# Patient Record
Sex: FEMALE | Race: WHITE | NOT HISPANIC OR LATINO | Employment: OTHER | URBAN - METROPOLITAN AREA
[De-identification: names, ages, dates, MRNs, and addresses within clinical notes are randomized per-mention and may not be internally consistent; named-entity substitution may affect disease eponyms.]

---

## 2017-01-13 ENCOUNTER — TELEPHONE (OUTPATIENT)
Dept: FAMILY MEDICINE | Facility: CLINIC | Age: 72
End: 2017-01-13

## 2017-01-13 NOTE — TELEPHONE ENCOUNTER
SPoke to patient. Patient is inquiring about labs done in November. There is no note on the lab result. Call back number [phone].

## 2017-05-16 ENCOUNTER — OFFICE VISIT (OUTPATIENT)
Dept: FAMILY MEDICINE | Facility: CLINIC | Age: 72
End: 2017-05-16
Payer: MEDICARE

## 2017-05-16 VITALS
BODY MASS INDEX: 26.33 KG/M2 | OXYGEN SATURATION: 97 % | HEIGHT: 66 IN | WEIGHT: 163.81 LBS | DIASTOLIC BLOOD PRESSURE: 82 MMHG | HEART RATE: 54 BPM | SYSTOLIC BLOOD PRESSURE: 138 MMHG

## 2017-05-16 DIAGNOSIS — Z23 NEED FOR TETANUS BOOSTER: ICD-10-CM

## 2017-05-16 DIAGNOSIS — K21.9 GASTROESOPHAGEAL REFLUX DISEASE WITHOUT ESOPHAGITIS: ICD-10-CM

## 2017-05-16 DIAGNOSIS — J30.9 ALLERGIC RHINITIS, UNSPECIFIED ALLERGIC RHINITIS TRIGGER, UNSPECIFIED RHINITIS SEASONALITY: ICD-10-CM

## 2017-05-16 DIAGNOSIS — M81.0 OSTEOPOROSIS, UNSPECIFIED OSTEOPOROSIS TYPE, UNSPECIFIED PATHOLOGICAL FRACTURE PRESENCE: ICD-10-CM

## 2017-05-16 DIAGNOSIS — E03.9 ACQUIRED HYPOTHYROIDISM: Primary | ICD-10-CM

## 2017-05-16 DIAGNOSIS — G47.00 INSOMNIA, UNSPECIFIED TYPE: ICD-10-CM

## 2017-05-16 DIAGNOSIS — E78.5 HYPERLIPIDEMIA, UNSPECIFIED HYPERLIPIDEMIA TYPE: ICD-10-CM

## 2017-05-16 PROCEDURE — 99214 OFFICE O/P EST MOD 30 MIN: CPT | Mod: S$PBB,,, | Performed by: FAMILY MEDICINE

## 2017-05-16 PROCEDURE — 90714 TD VACC NO PRESV 7 YRS+ IM: CPT | Mod: PBBFAC,PO | Performed by: FAMILY MEDICINE

## 2017-05-16 PROCEDURE — 99999 PR PBB SHADOW E&M-EST. PATIENT-LVL III: CPT | Mod: PBBFAC,,, | Performed by: FAMILY MEDICINE

## 2017-05-16 PROCEDURE — 99213 OFFICE O/P EST LOW 20 MIN: CPT | Mod: PBBFAC,PO | Performed by: FAMILY MEDICINE

## 2017-05-16 RX ORDER — LEVOTHYROXINE SODIUM 88 UG/1
88 TABLET ORAL DAILY
Qty: 90 TABLET | Refills: 3 | Status: SHIPPED | OUTPATIENT
Start: 2017-05-16 | End: 2017-11-13 | Stop reason: SDUPTHER

## 2017-05-16 NOTE — PROGRESS NOTES
Subjective:       Patient ID: Sarahy Coronel is a 71 y.o. female.    Chief Complaint: 6 Month Follow-up, Thyroid    HPI Comments: Here for f/u chronic medical issues.  States doing well overall.     Hyperlipidemia   This is a chronic problem. The current episode started more than 1 year ago. The problem is controlled. Recent lipid tests were reviewed and are normal. Pertinent negatives include no chest pain or shortness of breath.     Review of Systems   Constitutional: Negative for chills, fatigue and fever.   Respiratory: Negative for cough, chest tightness and shortness of breath.    Cardiovascular: Negative for chest pain, palpitations and leg swelling.   Gastrointestinal: Negative for abdominal distention and abdominal pain.   Endocrine: Negative for cold intolerance and heat intolerance.   Skin: Negative for rash and wound.   Psychiatric/Behavioral: Negative for dysphoric mood. The patient is not nervous/anxious.        Objective:      Physical Exam   Constitutional: She appears well-developed and well-nourished.   HENT:   Head: Normocephalic and atraumatic.   Cardiovascular: Normal rate, regular rhythm and normal heart sounds.    Pulmonary/Chest: Effort normal and breath sounds normal.   Psychiatric: She has a normal mood and affect.   Nursing note and vitals reviewed.      Assessment:       1. Acquired hypothyroidism    2. Insomnia, unspecified type    3. Allergic rhinitis, unspecified allergic rhinitis trigger, unspecified rhinitis seasonality    4. Gastroesophageal reflux disease without esophagitis    5. Hyperlipidemia, unspecified hyperlipidemia type    6. Osteoporosis, unspecified osteoporosis type, unspecified pathological fracture presence    7. Need for tetanus booster        Plan:       Acquired hypothyroidism  -     CBC auto differential; Future; Expected date: 5/16/17  -     Comprehensive metabolic panel; Future; Expected date: 5/16/17  -     Lipid panel; Future; Expected date: 5/16/17  -     TSH;  Future; Expected date: 5/16/17    Insomnia, unspecified type  -     CBC auto differential; Future; Expected date: 5/16/17  -     Comprehensive metabolic panel; Future; Expected date: 5/16/17  -     Lipid panel; Future; Expected date: 5/16/17  -     TSH; Future; Expected date: 5/16/17    Allergic rhinitis, unspecified allergic rhinitis trigger, unspecified rhinitis seasonality  -     CBC auto differential; Future; Expected date: 5/16/17  -     Comprehensive metabolic panel; Future; Expected date: 5/16/17  -     Lipid panel; Future; Expected date: 5/16/17  -     TSH; Future; Expected date: 5/16/17    Gastroesophageal reflux disease without esophagitis  -     CBC auto differential; Future; Expected date: 5/16/17  -     Comprehensive metabolic panel; Future; Expected date: 5/16/17  -     Lipid panel; Future; Expected date: 5/16/17  -     TSH; Future; Expected date: 5/16/17    Hyperlipidemia, unspecified hyperlipidemia type  -     CBC auto differential; Future; Expected date: 5/16/17  -     Comprehensive metabolic panel; Future; Expected date: 5/16/17  -     Lipid panel; Future; Expected date: 5/16/17  -     TSH; Future; Expected date: 5/16/17    Osteoporosis, unspecified osteoporosis type, unspecified pathological fracture presence  -     CBC auto differential; Future; Expected date: 5/16/17  -     Comprehensive metabolic panel; Future; Expected date: 5/16/17  -     Lipid panel; Future; Expected date: 5/16/17  -     TSH; Future; Expected date: 5/16/17    Need for tetanus booster  -     Td Vaccine- Preservative Free    Other orders  -     levothyroxine (SYNTHROID) 88 MCG tablet; Take 1 tablet (88 mcg total) by mouth once daily.  Dispense: 90 tablet; Refill: 3      Will monitor chronic medical issues and continue current plan of care.  Update labs and health maintenance.  claritin prn.  Return in about 6 months (around 11/16/2017), or if symptoms worsen or fail to improve.

## 2017-05-16 NOTE — MR AVS SNAPSHOT
[facility]  [address]  [place] [address]  Phone: [phone]  Fax: [phone]                  Sarahy Coronel   2017 10:20 AM   Office Visit    Description:  Female : 1945   Provider:  SANTI Nogueira MD   Department:  [facility]           Reason for Visit     6 Month Follow-up, Thyroid           Diagnoses this Visit        Comments    Acquired hypothyroidism    -  Primary     Insomnia, unspecified type         Allergic rhinitis, unspecified allergic rhinitis trigger, unspecified rhinitis seasonality         Gastroesophageal reflux disease without esophagitis         Hyperlipidemia, unspecified hyperlipidemia type         Osteoporosis, unspecified osteoporosis type, unspecified pathological fracture presence         Need for tetanus booster                To Do List           Future Appointments        Provider Department Dept Phone    2017 10:20 AM SANTI Nogueira MD [facility] [phone]      Goals (5 Years of Data)     None      Follow-Up and Disposition     Return in about 6 months (around 2017), or if symptoms worsen or fail to improve.       These Medications        Disp Refills Start End    levothyroxine (SYNTHROID) 88 MCG tablet 90 tablet 3 2017    Take 1 tablet (88 mcg total) by mouth once daily. - Oral    Pharmacy: [facility]/pharmacy #[id number] - [place] - [address] [place] [address].  #: [phone]         [facility]     [facility] Care [facility] -  Assistance  Unless otherwise directed by your provider, please contact [facility], our nurse care line that is available for  assistance.     Registered nurses in the [facility] provide: appointment scheduling, clinical advisement, health education, and other advisory services.  Call: [phone] (toll free)               Medications           Message regarding Medications     Verify the changes and/or  "additions to your medication regime listed below are the same as discussed with your clinician today.  If any of these changes or additions are incorrect, please notify your healthcare provider.        STOP taking these medications     albuterol 90 mcg/actuation inhaler Inhale 2 puffs into the lungs every 6 (six) hours as needed for Wheezing.    hydrOXYzine HCl (ATARAX) 25 MG tablet Take 1 tablet (25 mg total) by mouth nightly as needed (insomnia).           Verify that the below list of medications is an accurate representation of the medications you are currently taking.  If none reported, the list may be blank. If incorrect, please contact your healthcare provider. Carry this list with you in case of emergency.           Current Medications     calcium-vitamin D (CALCIUM-VITAMIN D) 500 mg(1,250mg) -200 unit per tablet Take 1 tablet by mouth once daily.      cholecalciferol, vitamin D3, (VITAMIN D3) 2,000 unit Cap Take 1 capsule by mouth once daily.    fish oil-omega-3 fatty acids 300-1,000 mg capsule Take 2 g by mouth once daily.    GLUCOSAMINE/MSM/CHONDROIT SULF (GLUCOSAMINE 2KCL-MSM-CHONDROIT ORAL) Take by mouth.    levothyroxine (SYNTHROID) 88 MCG tablet Take 1 tablet (88 mcg total) by mouth once daily.    loratadine (CLARITIN) 10 mg tablet Take 10 mg by mouth once daily.    multivitamin (MULTIVITAMIN) per tablet Take 1 tablet by mouth once daily.      aspirin (ECOTRIN) 81 MG EC tablet Take 81 mg by mouth once daily.           Clinical Reference Information           Your Vitals Were     BP Pulse Height Weight SpO2 BMI    138/82 54 5' 6" (1.676 m) 74.3 kg (163 lb 12.8 oz) 97% 26.44 kg/m2      Blood Pressure          Most Recent Value    BP  138/82      Allergies as of 5/16/2017     Demerol (Pf) [Meperidine (Pf)]    Erythromycin    Lorabid [Loracarbef]    Penicillin G    Fosamax [Alendronate]      Immunizations Administered on Date of Encounter - 5/16/2017     Name Date Dose VIS Date Route    TD  Incomplete 0.5 " mL 2/24/2015 Intramuscular      Orders Placed During Today's Visit      Normal Orders This Visit    Td Vaccine- Preservative Free     Future Labs/Procedures Expected by Expires    CBC auto differential  5/16/2017 11/12/2017    Comprehensive metabolic panel  5/16/2017 11/12/2017    Lipid panel  5/16/2017 11/12/2017    TSH  5/16/2017 11/12/2017      [facility] Sign-Up     Activating your [facility] account is as easy as 1-2-3!     1) Visit [email or web address], select Sign Up Now, enter this activation code and your date of birth, then select Next.  Activation code not generated  Current Patient Portal Status: Account disabled      2) Create a username and password to use when you visit [facility] in the future and select a security question in case you lose your password and select Next.    3) Enter your e-mail address and click Sign Up!    Additional Information  If you have questions, please e-mail [email or web address] or call [phone] to talk to our [facility] staff. Remember, [facility] is NOT to be used for urgent needs. For medical emergencies, dial 911.         Language Assistance Services     ATTENTION: Language assistance services are available, free of charge. Please call [phone].      ATENCIÓN: Si marisla gerard, tiene a kumar disposición servicios gratuitos de asistencia lingüística. Llame al [phone].     CHÚ Ý: N?u b?n nói Ti?ng Vi?t, có các d?ch v? h? tr? ngôn ng? mi?n phí dành cho b?n. G?i s? [phone].         [facility] complies with applicable Federal civil rights laws and does not discriminate on the basis of race, color, national origin, age, disability, or sex.

## 2017-06-24 ENCOUNTER — LAB VISIT (OUTPATIENT)
Dept: LAB | Facility: HOSPITAL | Age: 72
End: 2017-06-24
Attending: FAMILY MEDICINE
Payer: MEDICARE

## 2017-06-24 DIAGNOSIS — E78.5 HYPERLIPIDEMIA, UNSPECIFIED HYPERLIPIDEMIA TYPE: ICD-10-CM

## 2017-06-24 DIAGNOSIS — M81.0 OSTEOPOROSIS, UNSPECIFIED OSTEOPOROSIS TYPE, UNSPECIFIED PATHOLOGICAL FRACTURE PRESENCE: ICD-10-CM

## 2017-06-24 DIAGNOSIS — J30.9 ALLERGIC RHINITIS, UNSPECIFIED ALLERGIC RHINITIS TRIGGER, UNSPECIFIED RHINITIS SEASONALITY: ICD-10-CM

## 2017-06-24 DIAGNOSIS — G47.00 INSOMNIA, UNSPECIFIED TYPE: ICD-10-CM

## 2017-06-24 DIAGNOSIS — K21.9 GASTROESOPHAGEAL REFLUX DISEASE WITHOUT ESOPHAGITIS: ICD-10-CM

## 2017-06-24 DIAGNOSIS — E03.9 ACQUIRED HYPOTHYROIDISM: ICD-10-CM

## 2017-06-24 LAB
ALBUMIN SERPL BCP-MCNC: 3.8 G/DL
ALP SERPL-CCNC: 57 U/L
ALT SERPL W/O P-5'-P-CCNC: 20 U/L
ANION GAP SERPL CALC-SCNC: 8 MMOL/L
AST SERPL-CCNC: 21 U/L
BASOPHILS # BLD AUTO: 0.02 K/UL
BASOPHILS NFR BLD: 0.4 %
BILIRUB SERPL-MCNC: 0.4 MG/DL
BUN SERPL-MCNC: 22 MG/DL
CALCIUM SERPL-MCNC: 9.9 MG/DL
CHLORIDE SERPL-SCNC: 108 MMOL/L
CHOLEST/HDLC SERPL: 3.8 {RATIO}
CO2 SERPL-SCNC: 26 MMOL/L
CREAT SERPL-MCNC: 0.9 MG/DL
DIFFERENTIAL METHOD: NORMAL
EOSINOPHIL # BLD AUTO: 0.1 K/UL
EOSINOPHIL NFR BLD: 1.7 %
ERYTHROCYTE [DISTWIDTH] IN BLOOD BY AUTOMATED COUNT: 12.8 %
EST. GFR  (AFRICAN AMERICAN): >60 ML/MIN/1.73 M^2
EST. GFR  (NON AFRICAN AMERICAN): >60 ML/MIN/1.73 M^2
GLUCOSE SERPL-MCNC: 98 MG/DL
HCT VFR BLD AUTO: 40.1 %
HDL/CHOLESTEROL RATIO: 26.2 %
HDLC SERPL-MCNC: 214 MG/DL
HDLC SERPL-MCNC: 56 MG/DL
HGB BLD-MCNC: 13.2 G/DL
LDLC SERPL CALC-MCNC: 136.2 MG/DL
LYMPHOCYTES # BLD AUTO: 1.4 K/UL
LYMPHOCYTES NFR BLD: 29.1 %
MCH RBC QN AUTO: 30.1 PG
MCHC RBC AUTO-ENTMCNC: 32.9 %
MCV RBC AUTO: 92 FL
MONOCYTES # BLD AUTO: 0.4 K/UL
MONOCYTES NFR BLD: 8.9 %
NEUTROPHILS # BLD AUTO: 2.9 K/UL
NEUTROPHILS NFR BLD: 59.7 %
NONHDLC SERPL-MCNC: 158 MG/DL
PLATELET # BLD AUTO: 195 K/UL
PMV BLD AUTO: 11.6 FL
POTASSIUM SERPL-SCNC: 5.3 MMOL/L
PROT SERPL-MCNC: 7.4 G/DL
RBC # BLD AUTO: 4.38 M/UL
SODIUM SERPL-SCNC: 142 MMOL/L
TRIGL SERPL-MCNC: 109 MG/DL
TSH SERPL DL<=0.005 MIU/L-ACNC: 1.06 UIU/ML
WBC # BLD AUTO: 4.84 K/UL

## 2017-06-24 PROCEDURE — 84443 ASSAY THYROID STIM HORMONE: CPT

## 2017-06-24 PROCEDURE — 85025 COMPLETE CBC W/AUTO DIFF WBC: CPT

## 2017-06-24 PROCEDURE — 80061 LIPID PANEL: CPT

## 2017-06-24 PROCEDURE — 36415 COLL VENOUS BLD VENIPUNCTURE: CPT | Mod: PO

## 2017-06-24 PROCEDURE — 80053 COMPREHEN METABOLIC PANEL: CPT

## 2017-06-28 DIAGNOSIS — E87.5 HYPERKALEMIA: Primary | ICD-10-CM

## 2017-06-30 ENCOUNTER — TELEPHONE (OUTPATIENT)
Dept: FAMILY MEDICINE | Facility: CLINIC | Age: 72
End: 2017-06-30

## 2017-06-30 NOTE — TELEPHONE ENCOUNTER
----- Message from Keya Alex sent at 6/30/2017  2:52 PM CDT -----  Contact: pt  Returning call, results  Call back on # [phone]  thanks

## 2017-07-10 ENCOUNTER — OFFICE VISIT (OUTPATIENT)
Dept: PRIMARY CARE CLINIC | Facility: CLINIC | Age: 72
End: 2017-07-10
Payer: MEDICARE

## 2017-07-10 VITALS
OXYGEN SATURATION: 96 % | WEIGHT: 163.13 LBS | HEART RATE: 65 BPM | SYSTOLIC BLOOD PRESSURE: 116 MMHG | DIASTOLIC BLOOD PRESSURE: 80 MMHG | TEMPERATURE: 98 F | BODY MASS INDEX: 26.22 KG/M2 | HEIGHT: 66 IN

## 2017-07-10 DIAGNOSIS — L23.7 POISON IVY DERMATITIS: Primary | ICD-10-CM

## 2017-07-10 DIAGNOSIS — L98.9 SKIN LESION OF RIGHT ARM: ICD-10-CM

## 2017-07-10 DIAGNOSIS — M26.621 ARTHRALGIA OF RIGHT TEMPOROMANDIBULAR JOINT: ICD-10-CM

## 2017-07-10 PROCEDURE — 1126F AMNT PAIN NOTED NONE PRSNT: CPT | Mod: ,,, | Performed by: NURSE PRACTITIONER

## 2017-07-10 PROCEDURE — 99999 PR PBB SHADOW E&M-EST. PATIENT-LVL IV: CPT | Mod: PBBFAC,,, | Performed by: NURSE PRACTITIONER

## 2017-07-10 PROCEDURE — 99214 OFFICE O/P EST MOD 30 MIN: CPT | Mod: PBBFAC,PO | Performed by: NURSE PRACTITIONER

## 2017-07-10 PROCEDURE — 99213 OFFICE O/P EST LOW 20 MIN: CPT | Mod: S$PBB,25,, | Performed by: NURSE PRACTITIONER

## 2017-07-10 PROCEDURE — 1159F MED LIST DOCD IN RCRD: CPT | Mod: ,,, | Performed by: NURSE PRACTITIONER

## 2017-07-10 RX ORDER — BETAMETHASONE SODIUM PHOSPHATE AND BETAMETHASONE ACETATE 3; 3 MG/ML; MG/ML
6 INJECTION, SUSPENSION INTRA-ARTICULAR; INTRALESIONAL; INTRAMUSCULAR; SOFT TISSUE
Status: COMPLETED | OUTPATIENT
Start: 2017-07-10 | End: 2017-07-10

## 2017-07-10 RX ADMIN — BETAMETHASONE SODIUM PHOSPHATE AND BETAMETHASONE ACETATE 6 MG: 3; 3 INJECTION, SUSPENSION INTRA-ARTICULAR; INTRALESIONAL; INTRAMUSCULAR at 01:07

## 2017-07-10 NOTE — PATIENT INSTRUCTIONS
Your symptoms and exam today are consistent with poison ivy.    Topical Ivarest as needed    Benadryl at bedtime and Osfoiqot62 mg tab in the morning for itching per instructions.    Cool compresses through out the day until improved.    If you are not better in 2-3 days, if worse or you have concerns or questions, please do not hesitate to call.  You can reach us at [phone] Monday through Friday (except holidays) 12 nooon to 6 p.m.    Jaw: pain seems to me be improving.  Ibuprofen.    Avoid chewy foods, gums, candies.    For the right arm lesion:  See Dr. Lopez.    Thank you for using the [facility]!    YOSI Gonzalez, CNP, FNP-BC  [facility]  [facility]

## 2017-07-10 NOTE — PROGRESS NOTES
Sarahy Coronel is a 71 y.o. female patient of FREDRICK Nogueira MD who presents to the clinic today for   Chief Complaint   Patient presents with    Poison Ivy   .    HPI    Pt, who is not known to me, reports a new problem to me:  Rash on her arms after weed eating  or 17.    These symptoms began 5 days ago and status is continuing.     Pt denies the following symptoms:  Fever, illness.    Aggravating factors include poison ivy exposure .    Relieving factors include none .    OTC Medications tried are IvaRest.    Prescription medications taken for symptoms are none.    Pertinent medical history:  H/o extreme allergy to poison ivy.    ROS    Constitutional:  No fever, + fatigue-- never sleeps well.    Skin:  See chief complaint/HPI..  Also started a few weeks ago with a spot on the right post arm an itchy bump, slightly bigger.    HEENT:  No complaints.    Heart/Lungs:  No complaints    GI/:  No sxs.    MS:  No new problems in bones, joints or muscles.      PAST MEDICAL HISTORY:  Past Medical History:   Diagnosis Date    Actinic keratoses     Allergy     Chronic bronchitis     DVT (deep venous thrombosis)     leg, no precipitating factors, no longer on anticoagulants    Encounter for blood transfusion     ectopic pregnancy    Family history of complications due to general anesthesia     sister's daughter almost  from MALIGNANT HYPERTHERMIA ( pt denies adverse reaction)    GERD (gastroesophageal reflux disease)     no medication    Hypothyroidism     Leukopenia past Hx    Mitral regurgitation     mild,asymptomatic    Phlebitis     past Hx, legs    PONV (postoperative nausea and vomiting)     once only    TMJ (dislocation of temporomandibular joint)        PAST SURGICAL HISTORY:  Past Surgical History:   Procedure Laterality Date    APPENDECTOMY      CHOLECYSTECTOMY      ECTOPIC PREGNANCY SURGERY      removal of ovary and tube prior to hysterectomy    ESOPHAGOGASTRODUODENOSCOPY       KNEE SURGERY  8/14    meniscal repair    LIPOMA RESECTION      left shoulder    TOTAL ABDOMINAL HYSTERECTOMY W/ BILATERAL SALPINGOOPHORECTOMY  1970's    after ectopic pregnancy       SOCIAL HISTORY:  Social History     Social History    Marital status:      Spouse name: N/A    Number of children: N/A    Years of education: N/A     Occupational History    Not on file.     Social History Main Topics    Smoking status: Never Smoker    Smokeless tobacco: Never Used    Alcohol use No    Drug use: No    Sexual activity: Not on file     Other Topics Concern    Not on file     Social History Narrative    No narrative on file       FAMILY HISTORY:  Family History   Problem Relation Age of Onset    Heart disease Mother     Cancer Father     Malignant hyperthermia Other      niece       ALLERGIES AND MEDICATIONS: updated and reviewed.  Review of patient's allergies indicates:   Allergen Reactions    Demerol (pf) [meperidine (pf)] Nausea And Vomiting    Erythromycin Other (See Comments)     Stomach cramps    Lorabid [loracarbef] Nausea And Vomiting    Penicillin g Hives    Fosamax [alendronate] Other (See Comments)     Bone pain     Current Outpatient Prescriptions   Medication Sig Dispense Refill    aspirin (ECOTRIN) 81 MG EC tablet Take 81 mg by mouth once daily.      calcium-vitamin D (CALCIUM-VITAMIN D) 500 mg(1,250mg) -200 unit per tablet Take 1 tablet by mouth once daily.        cholecalciferol, vitamin D3, (VITAMIN D3) 2,000 unit Cap Take 1 capsule by mouth once daily.      fish oil-omega-3 fatty acids 300-1,000 mg capsule Take 2 g by mouth once daily.      GLUCOSAMINE/MSM/CHONDROIT SULF (GLUCOSAMINE 2KCL-MSM-CHONDROIT ORAL) Take by mouth.      levothyroxine (SYNTHROID) 88 MCG tablet Take 1 tablet (88 mcg total) by mouth once daily. 90 tablet 3    loratadine (CLARITIN) 10 mg tablet Take 10 mg by mouth daily as needed.        No current facility-administered medications for this  visit.          PHYSICAL EXAM    Alert, coop 71 y.o. female patient in no acute distress, is not ill-appearing.    Vitals:    07/10/17 1235   BP: 116/80   Pulse: 65   Temp: 98.3 °F (36.8 °C)     VS reviewed.  VS stable.  CC, nursing note, medications & PMH all reviewed today.    Head:  Normocephalic, atraumatic.    EENT:  Ext nose/ears normal.               Eyes with normal lids, not injected.               Tongue w/o lesions.      Resp:  Respirations even, unlabored.              Lungs CTA bilat.    Heart:  RRR, no MRG.    MS:  Ambulates normally.          No TMJ edema or redness.  Left with clicking, right with mild tenderness with opening mouth wide..     NEURO:  Alert and oriented x 4.  Responds appropriately during interaction.    Skin:  Warm, dry, color good.            A/an erythematous, pruritic rash of small, densely distributed papules is noted on forearms and scattered on distal upper arms.            No pustules or vesicles noted.           Back of the right upper arm is noted an irregularly shaped, evenly pigmented, somewhat shiny lesion, approx 0.75 cm diam, NT, nonerythematous.    Psych:  Responds appropriately throughout the visit.               Relaxed.  Well-groomed    Poison ivy dermatitis  -     betamethasone acetate-betamethasone sodium phosphate injection 6 mg; Inject 1 mL (6 mg total) into the muscle one time.    Arthralgia of right temporomandibular joint    Skin lesion of right arm  Comments:  has appt with [facility], Dr. Lopez      Pt today presents with 5 days of rash on her arms after weed eating.  Very allergic to poison ivy, she states.  No breathing problems.  OTCs helping some but not resolving.  Has had steroids for this in the past w/o problems.  Right TMJ pain but no redness, crepitus or swelling.  This seems improved.  Right post arm lesion--agree with evaluation by derm.    This is a new problem to me.  No work up is planned.        Pt advised to perform comfort measures  recommended on patient instruction sheet .    If rash not better in 2-3 days, the patient is advised to call us.  If worse or concerns, the patient is advised to call us.  Explained exam findings, diagnosis and treatment plan to patient.  Questions answered and patient states understanding.

## 2017-07-18 ENCOUNTER — LAB VISIT (OUTPATIENT)
Dept: LAB | Facility: HOSPITAL | Age: 72
End: 2017-07-18
Attending: FAMILY MEDICINE
Payer: MEDICARE

## 2017-07-18 DIAGNOSIS — E87.5 HYPERKALEMIA: ICD-10-CM

## 2017-07-18 LAB
ANION GAP SERPL CALC-SCNC: 9 MMOL/L
BUN SERPL-MCNC: 17 MG/DL
CALCIUM SERPL-MCNC: 9.7 MG/DL
CHLORIDE SERPL-SCNC: 107 MMOL/L
CO2 SERPL-SCNC: 26 MMOL/L
CREAT SERPL-MCNC: 0.8 MG/DL
EST. GFR  (AFRICAN AMERICAN): >60 ML/MIN/1.73 M^2
EST. GFR  (NON AFRICAN AMERICAN): >60 ML/MIN/1.73 M^2
GLUCOSE SERPL-MCNC: 101 MG/DL
POTASSIUM SERPL-SCNC: 4.8 MMOL/L
SODIUM SERPL-SCNC: 142 MMOL/L

## 2017-07-18 PROCEDURE — 36415 COLL VENOUS BLD VENIPUNCTURE: CPT | Mod: PO

## 2017-07-18 PROCEDURE — 80048 BASIC METABOLIC PNL TOTAL CA: CPT | Mod: PO

## 2017-07-19 ENCOUNTER — TELEPHONE (OUTPATIENT)
Dept: FAMILY MEDICINE | Facility: CLINIC | Age: 72
End: 2017-07-19

## 2017-07-19 NOTE — TELEPHONE ENCOUNTER
Spoke to pt and she would like results of recent lab. States if it is still high does she need a kidney test.  Pt notified that dr alvarez is out of the office and I will forward the message when he returns. Pt verbalized understanding.

## 2017-07-19 NOTE — TELEPHONE ENCOUNTER
----- Message from Joelle Menendez sent at 7/19/2017  2:02 PM CDT -----  Contact: pt  Lab result  Call back

## 2017-07-21 NOTE — TELEPHONE ENCOUNTER
Dr Moon this patient is asking for results of BMP, kevin CMP noted elevated potassium. Patient is concerned and is asking for lab results. Can you please advise of results. Dr Nogueira is out of the office, Thank you .

## 2017-09-20 ENCOUNTER — HOSPITAL ENCOUNTER (OUTPATIENT)
Dept: RADIOLOGY | Facility: HOSPITAL | Age: 72
Discharge: HOME | End: 2017-09-20
Attending: ORTHOPAEDIC SURGERY
Payer: MEDICARE

## 2017-09-20 ENCOUNTER — OFFICE VISIT (OUTPATIENT)
Dept: ORTHOPEDICS | Facility: CLINIC | Age: 72
End: 2017-09-20
Payer: MEDICARE

## 2017-09-20 VITALS — HEIGHT: 66 IN | BODY MASS INDEX: 26.2 KG/M2 | WEIGHT: 163 LBS

## 2017-09-20 DIAGNOSIS — M17.11 PRIMARY OSTEOARTHRITIS OF RIGHT KNEE: Primary | ICD-10-CM

## 2017-09-20 DIAGNOSIS — M25.561 CHRONIC PAIN OF RIGHT KNEE: ICD-10-CM

## 2017-09-20 DIAGNOSIS — G89.29 CHRONIC PAIN OF RIGHT KNEE: ICD-10-CM

## 2017-09-20 DIAGNOSIS — M25.561 RIGHT KNEE PAIN, UNSPECIFIED CHRONICITY: Primary | ICD-10-CM

## 2017-09-20 DIAGNOSIS — M25.561 RIGHT KNEE PAIN, UNSPECIFIED CHRONICITY: ICD-10-CM

## 2017-09-20 PROCEDURE — 1159F MED LIST DOCD IN RCRD: CPT | Mod: ,,, | Performed by: ORTHOPAEDIC SURGERY

## 2017-09-20 PROCEDURE — 99999 PR PBB SHADOW E&M-EST. PATIENT-LVL II: CPT | Mod: PBBFAC,,, | Performed by: ORTHOPAEDIC SURGERY

## 2017-09-20 PROCEDURE — 73562 X-RAY EXAM OF KNEE 3: CPT | Mod: 26,RT,, | Performed by: RADIOLOGY

## 2017-09-20 PROCEDURE — 73560 X-RAY EXAM OF KNEE 1 OR 2: CPT | Mod: 26,59,LT, | Performed by: RADIOLOGY

## 2017-09-20 PROCEDURE — 73560 X-RAY EXAM OF KNEE 1 OR 2: CPT | Mod: TC,PO,LT

## 2017-09-20 PROCEDURE — 1125F AMNT PAIN NOTED PAIN PRSNT: CPT | Mod: ,,, | Performed by: ORTHOPAEDIC SURGERY

## 2017-09-20 PROCEDURE — 99214 OFFICE O/P EST MOD 30 MIN: CPT | Mod: 25,S$PBB,, | Performed by: ORTHOPAEDIC SURGERY

## 2017-09-20 PROCEDURE — 20610 DRAIN/INJ JOINT/BURSA W/O US: CPT | Mod: PBBFAC,PO | Performed by: ORTHOPAEDIC SURGERY

## 2017-09-20 PROCEDURE — 99212 OFFICE O/P EST SF 10 MIN: CPT | Mod: PBBFAC,25,PO | Performed by: ORTHOPAEDIC SURGERY

## 2017-09-20 RX ORDER — TRIAMCINOLONE ACETONIDE 40 MG/ML
40 INJECTION, SUSPENSION INTRA-ARTICULAR; INTRAMUSCULAR
Status: DISCONTINUED | OUTPATIENT
Start: 2017-09-20 | End: 2017-09-20 | Stop reason: HOSPADM

## 2017-09-20 RX ADMIN — TRIAMCINOLONE ACETONIDE 40 MG: 40 INJECTION, SUSPENSION INTRA-ARTICULAR; INTRAMUSCULAR at 12:09

## 2017-09-20 NOTE — PROGRESS NOTES
HISTORY OF PRESENT ILLNESS:  71 years old, complaining of pain in her right knee   for a couple of days' time, had injection over a year ago, responded well,   requesting an injection today, aching type pain, 4/10 on the pain scale.    PHYSICAL EXAMINATION:  Exam shows tenderness at the joint line.  No signs of   infection.  No instability.  Skin is intact.  Compartments are soft.    X-rays show arthritic changes.    ASSESSMENT:  Right knee arthrosis.    PLAN:  Kenalog injection, strengthening over time.  Follow up as needed.      PBB/HN  dd: 2017 12:43:06 (CDT)  td: 2017 03:31:28 (CDT)  Doc ID   #[id number]  Job ID #[id number]    CC:     Further History  Aching pain  Worse with activity  Relieved with rest  No other associated symptoms  No other radiation    Further Exam  Alert and oriented  Pleasant  Contralateral limb has appropriate range of motion for age and condition  Contralateral limb has appropriate strength for age and condition  Contralateral limb has appropriate stability  for age and condition  No adenopathy  Pulses are appropriate for current condition  Skin is intact        Chief Complaint    Chief Complaint   Patient presents with    Knee Pain     right knee pain       HPI  Sarahy Coronel is a 71 y.o.  female who presents with       Past Medical History  Past Medical History:   Diagnosis Date    Actinic keratoses     Allergy     Chronic bronchitis     DVT (deep venous thrombosis)     leg, no precipitating factors, no longer on anticoagulants    Encounter for blood transfusion     ectopic pregnancy    Family history of complications due to general anesthesia     sister's daughter almost  from MALIGNANT HYPERTHERMIA ( pt denies adverse reaction)    GERD (gastroesophageal reflux disease)     no medication    Hypothyroidism     Leukopenia past Hx    Mitral regurgitation     mild,asymptomatic    Phlebitis     past Hx, legs    PONV (postoperative nausea and vomiting)     once only     TMJ (dislocation of temporomandibular joint)        Past Surgical History  Past Surgical History:   Procedure Laterality Date    APPENDECTOMY      CHOLECYSTECTOMY      ECTOPIC PREGNANCY SURGERY      removal of ovary and tube prior to hysterectomy    ESOPHAGOGASTRODUODENOSCOPY      KNEE SURGERY  8/14    meniscal repair    LIPOMA RESECTION      left shoulder    TOTAL ABDOMINAL HYSTERECTOMY W/ BILATERAL SALPINGOOPHORECTOMY  1970's    after ectopic pregnancy       Medications  Current Outpatient Prescriptions   Medication Sig    aspirin (ECOTRIN) 81 MG EC tablet Take 81 mg by mouth once daily.    calcium-vitamin D (CALCIUM-VITAMIN D) 500 mg(1,250mg) -200 unit per tablet Take 1 tablet by mouth once daily.      cholecalciferol, vitamin D3, (VITAMIN D3) 2,000 unit Cap Take 1 capsule by mouth once daily.    fish oil-omega-3 fatty acids 300-1,000 mg capsule Take 2 g by mouth once daily.    GLUCOSAMINE/MSM/CHONDROIT SULF (GLUCOSAMINE 2KCL-MSM-CHONDROIT ORAL) Take by mouth.    levothyroxine (SYNTHROID) 88 MCG tablet Take 1 tablet (88 mcg total) by mouth once daily.    loratadine (CLARITIN) 10 mg tablet Take 10 mg by mouth daily as needed.      No current facility-administered medications for this visit.        Allergies  Review of patient's allergies indicates:   Allergen Reactions    Demerol (pf) [meperidine (pf)] Nausea And Vomiting    Erythromycin Other (See Comments)     Stomach cramps    Lorabid [loracarbef] Nausea And Vomiting    Penicillin g Hives    Fosamax [alendronate] Other (See Comments)     Bone pain       Family History  Family History   Problem Relation Age of Onset    Heart disease Mother     Cancer Father     Malignant hyperthermia Other      niece       Social History  Social History     Social History    Marital status:      Spouse name: N/A    Number of children: N/A    Years of education: N/A     Occupational History    Not on file.     Social History Main Topics     Smoking status: Never Smoker    Smokeless tobacco: Never Used    Alcohol use No    Drug use: No    Sexual activity: Not on file     Other Topics Concern    Not on file     Social History Narrative    No narrative on file               Review of Systems     Constitutional: Negative    HENT: Negative  Eyes: Negative  Respiratory: Negative  Cardiovascular: Negative  Musculoskeletal: HPI  Skin: Negative  Neurological: Negative  Hematological: Negative  Endocrine: Negative                 Physical Exam    There were no vitals filed for this visit.  Body mass index is 26.31 kg/m².  Physical Examination:     General appearance -  well appearing, and in no distress  Mental status - awake  Neck - supple  Chest -  symmetric air entry  Heart - normal rate   Abdomen - soft      Assessment     1. Primary osteoarthritis of right knee    2. Chronic pain of right knee          Plan

## 2017-09-20 NOTE — PROCEDURES
Large Joint Aspiration/Injection  Date/Time: 9/20/2017 12:43 PM  Performed by: CHAYA CURTIS  Authorized by: CHAYA CURTIS.     Consent Done?:  Yes (Verbal)  Indications:  Pain  Timeout: Prior to procedure the correct patient, procedure, and site was verified      Location:  Knee  Site:  R knee  Prep: Patient was prepped and draped in usual sterile fashion    Ultrasonic Guidance for needle placement: No  Needle size:  21 G  Approach:  Anterolateral  Medications:  40 mg triamcinolone acetonide 40 mg/mL  Patient tolerance:  Patient tolerated the procedure well with no immediate complications

## 2017-09-28 ENCOUNTER — HOSPITAL ENCOUNTER (OUTPATIENT)
Dept: RADIOLOGY | Facility: HOSPITAL | Age: 72
Discharge: HOME | End: 2017-09-28
Attending: FAMILY MEDICINE
Payer: MEDICARE

## 2017-09-28 DIAGNOSIS — Z12.31 ENCOUNTER FOR SCREENING MAMMOGRAM FOR MALIGNANT NEOPLASM OF BREAST: ICD-10-CM

## 2017-09-28 DIAGNOSIS — Z12.31 VISIT FOR SCREENING MAMMOGRAM: ICD-10-CM

## 2017-09-28 PROCEDURE — 77067 SCR MAMMO BI INCL CAD: CPT | Mod: 26,,, | Performed by: RADIOLOGY

## 2017-09-28 PROCEDURE — 77063 BREAST TOMOSYNTHESIS BI: CPT | Mod: 26,,, | Performed by: RADIOLOGY

## 2017-09-28 PROCEDURE — 77067 SCR MAMMO BI INCL CAD: CPT | Mod: TC

## 2017-09-29 ENCOUNTER — TELEPHONE (OUTPATIENT)
Dept: RADIOLOGY | Facility: HOSPITAL | Age: 72
End: 2017-09-29

## 2017-10-05 ENCOUNTER — HOSPITAL ENCOUNTER (OUTPATIENT)
Dept: RADIOLOGY | Facility: HOSPITAL | Age: 72
Discharge: HOME | End: 2017-10-05
Attending: FAMILY MEDICINE
Payer: MEDICARE

## 2017-10-05 DIAGNOSIS — R92.8 ABNORMAL MAMMOGRAM OF RIGHT BREAST: ICD-10-CM

## 2017-10-05 PROCEDURE — 77061 BREAST TOMOSYNTHESIS UNI: CPT | Mod: 26,,, | Performed by: RADIOLOGY

## 2017-10-05 PROCEDURE — 77061 BREAST TOMOSYNTHESIS UNI: CPT | Mod: TC

## 2017-10-05 PROCEDURE — 77065 DX MAMMO INCL CAD UNI: CPT | Mod: 26,,, | Performed by: RADIOLOGY

## 2017-10-05 PROCEDURE — 76642 ULTRASOUND BREAST LIMITED: CPT | Mod: 26,RT,, | Performed by: RADIOLOGY

## 2017-10-05 PROCEDURE — 76642 ULTRASOUND BREAST LIMITED: CPT | Mod: TC,PO,RT

## 2017-10-06 ENCOUNTER — TELEPHONE (OUTPATIENT)
Dept: RADIOLOGY | Facility: HOSPITAL | Age: 72
End: 2017-10-06

## 2017-10-06 NOTE — TELEPHONE ENCOUNTER
..Patient notified of diagnostic mammogram results. Right breast biopsy is scheduled on  10/12/0217

## 2017-10-12 ENCOUNTER — HOSPITAL ENCOUNTER (OUTPATIENT)
Dept: RADIOLOGY | Facility: HOSPITAL | Age: 72
Discharge: HOME | End: 2017-10-12
Attending: FAMILY MEDICINE
Payer: MEDICARE

## 2017-10-12 DIAGNOSIS — R92.8 ABNORMAL MAMMOGRAM OF RIGHT BREAST: ICD-10-CM

## 2017-10-12 PROCEDURE — 88360 TUMOR IMMUNOHISTOCHEM/MANUAL: CPT | Mod: 26,,, | Performed by: PATHOLOGY

## 2017-10-12 PROCEDURE — 88305 TISSUE EXAM BY PATHOLOGIST: CPT | Performed by: PATHOLOGY

## 2017-10-12 PROCEDURE — 88305 TISSUE EXAM BY PATHOLOGIST: CPT | Mod: 26,,, | Performed by: PATHOLOGY

## 2017-10-12 PROCEDURE — A4648 IMPLANTABLE TISSUE MARKER: HCPCS | Mod: PO

## 2017-10-12 PROCEDURE — 88360 TUMOR IMMUNOHISTOCHEM/MANUAL: CPT | Performed by: PATHOLOGY

## 2017-10-12 PROCEDURE — 19083 BX BREAST 1ST LESION US IMAG: CPT | Mod: ,,, | Performed by: RADIOLOGY

## 2017-10-17 ENCOUNTER — TELEPHONE (OUTPATIENT)
Dept: FAMILY MEDICINE | Facility: CLINIC | Age: 72
End: 2017-10-17

## 2017-10-17 NOTE — TELEPHONE ENCOUNTER
Patient is asking for an abx to be called to pharmacy. States she is coughing up thick green mucous and temp of 101.  Taking mucinex and Claritin. I advised patient she needs to come in for an appointment for evaluation. Patient states she it too fatigued to drive and doesn't have anyone to bring her. Please advise.

## 2017-10-17 NOTE — TELEPHONE ENCOUNTER
----- Message from Tai Jackson sent at 10/17/2017  3:38 PM CDT -----  Contact: Patient  Patient states that she had a biopsy done on the right breast and she would like to know if the results have come in.  She also has an upper respitory infection and she would like for some medications to be called in.  Can you please call the patient back at [phone].  Thank you      [facility] #[id number] - [place] - [address]  [address]  [place] [address]  Phone: [phone] Fax: [phone]

## 2017-10-23 ENCOUNTER — TELEPHONE (OUTPATIENT)
Dept: RADIOLOGY | Facility: HOSPITAL | Age: 72
End: 2017-10-23

## 2017-10-23 ENCOUNTER — OFFICE VISIT (OUTPATIENT)
Dept: SURGERY | Facility: CLINIC | Age: 72
End: 2017-10-23
Payer: MEDICARE

## 2017-10-23 ENCOUNTER — LAB VISIT (OUTPATIENT)
Dept: LAB | Facility: HOSPITAL | Age: 72
End: 2017-10-23
Attending: SURGERY
Payer: MEDICARE

## 2017-10-23 VITALS
HEART RATE: 71 BPM | SYSTOLIC BLOOD PRESSURE: 162 MMHG | WEIGHT: 156.06 LBS | TEMPERATURE: 98 F | BODY MASS INDEX: 25.19 KG/M2 | DIASTOLIC BLOOD PRESSURE: 73 MMHG

## 2017-10-23 DIAGNOSIS — N63.0 BREAST MASS IN FEMALE: Primary | ICD-10-CM

## 2017-10-23 DIAGNOSIS — Z01.818 PREOP TESTING: ICD-10-CM

## 2017-10-23 LAB
ALBUMIN SERPL BCP-MCNC: 3.6 G/DL
ALP SERPL-CCNC: 68 U/L
ALT SERPL W/O P-5'-P-CCNC: 17 U/L
ANION GAP SERPL CALC-SCNC: 7 MMOL/L
AST SERPL-CCNC: 19 U/L
BASOPHILS # BLD AUTO: 0.03 K/UL
BASOPHILS NFR BLD: 0.5 %
BILIRUB SERPL-MCNC: 0.2 MG/DL
BUN SERPL-MCNC: 16 MG/DL
CALCIUM SERPL-MCNC: 10.2 MG/DL
CHLORIDE SERPL-SCNC: 106 MMOL/L
CO2 SERPL-SCNC: 28 MMOL/L
CREAT SERPL-MCNC: 1 MG/DL
DIFFERENTIAL METHOD: NORMAL
EOSINOPHIL # BLD AUTO: 0.1 K/UL
EOSINOPHIL NFR BLD: 1.1 %
ERYTHROCYTE [DISTWIDTH] IN BLOOD BY AUTOMATED COUNT: 12.2 %
EST. GFR  (AFRICAN AMERICAN): >60 ML/MIN/1.73 M^2
EST. GFR  (NON AFRICAN AMERICAN): 57.2 ML/MIN/1.73 M^2
GLUCOSE SERPL-MCNC: 113 MG/DL
HCT VFR BLD AUTO: 37.4 %
HGB BLD-MCNC: 13 G/DL
IMM GRANULOCYTES # BLD AUTO: 0.03 K/UL
IMM GRANULOCYTES NFR BLD AUTO: 0.5 %
LYMPHOCYTES # BLD AUTO: 1.5 K/UL
LYMPHOCYTES NFR BLD: 23 %
MCH RBC QN AUTO: 30.6 PG
MCHC RBC AUTO-ENTMCNC: 34.8 G/DL
MCV RBC AUTO: 88 FL
MONOCYTES # BLD AUTO: 0.5 K/UL
MONOCYTES NFR BLD: 7.7 %
NEUTROPHILS # BLD AUTO: 4.5 K/UL
NEUTROPHILS NFR BLD: 67.2 %
NRBC BLD-RTO: 0 /100 WBC
PLATELET # BLD AUTO: 253 K/UL
PMV BLD AUTO: 11.4 FL
POTASSIUM SERPL-SCNC: 4.9 MMOL/L
PROT SERPL-MCNC: 7.6 G/DL
RBC # BLD AUTO: 4.25 M/UL
SODIUM SERPL-SCNC: 141 MMOL/L
WBC # BLD AUTO: 6.62 K/UL

## 2017-10-23 PROCEDURE — 99999 PR PBB SHADOW E&M-EST. PATIENT-LVL III: CPT | Mod: PBBFAC,,, | Performed by: SURGERY

## 2017-10-23 PROCEDURE — 80053 COMPREHEN METABOLIC PANEL: CPT

## 2017-10-23 PROCEDURE — 99213 OFFICE O/P EST LOW 20 MIN: CPT | Mod: PBBFAC,PO | Performed by: SURGERY

## 2017-10-23 PROCEDURE — 36415 COLL VENOUS BLD VENIPUNCTURE: CPT | Mod: PO

## 2017-10-23 PROCEDURE — 93005 ELECTROCARDIOGRAM TRACING: CPT | Mod: PBBFAC,PO | Performed by: INTERNAL MEDICINE

## 2017-10-23 PROCEDURE — 99204 OFFICE O/P NEW MOD 45 MIN: CPT | Mod: S$PBB,,, | Performed by: SURGERY

## 2017-10-23 PROCEDURE — 93010 ELECTROCARDIOGRAM REPORT: CPT | Mod: S$PBB,,, | Performed by: INTERNAL MEDICINE

## 2017-10-23 PROCEDURE — 85025 COMPLETE CBC W/AUTO DIFF WBC: CPT

## 2017-10-23 RX ORDER — SODIUM CHLORIDE 9 MG/ML
INJECTION, SOLUTION INTRAVENOUS CONTINUOUS
Status: CANCELLED | OUTPATIENT
Start: 2017-10-23

## 2017-10-23 NOTE — PROGRESS NOTES
Subjective:       Patient ID: Sarahy Coronel is a 72 y.o. female.    Chief Complaint: Consult (atypical pappillary)      HPI 71 yo female referred for excision of recently diagnosed right breast atypical papillary lesion. Radiology performed US guided biopsy. Pt denies any symptoms. Has history of fibrocystic changes. No previous biopsy.    Past Medical History:   Diagnosis Date    Actinic keratoses     Allergy     Chronic bronchitis     DVT (deep venous thrombosis)     leg, no precipitating factors, no longer on anticoagulants    Encounter for blood transfusion     ectopic pregnancy    Family history of complications due to general anesthesia     sister's daughter almost  from MALIGNANT HYPERTHERMIA ( pt denies adverse reaction)    GERD (gastroesophageal reflux disease)     no medication    Hypothyroidism     Leukopenia past Hx    Mitral regurgitation     mild,asymptomatic    Phlebitis     past Hx, legs    PONV (postoperative nausea and vomiting)     once only    TMJ (dislocation of temporomandibular joint)      Past Surgical History:   Procedure Laterality Date    APPENDECTOMY      CHOLECYSTECTOMY      ECTOPIC PREGNANCY SURGERY      removal of ovary and tube prior to hysterectomy    ESOPHAGOGASTRODUODENOSCOPY      KNEE SURGERY      meniscal repair    LIPOMA RESECTION      left shoulder    TOTAL ABDOMINAL HYSTERECTOMY W/ BILATERAL SALPINGOOPHORECTOMY      after ectopic pregnancy           Current Outpatient Prescriptions:     aspirin (ECOTRIN) 81 MG EC tablet, Take 81 mg by mouth once daily., Disp: , Rfl:     calcium-vitamin D (CALCIUM-VITAMIN D) 500 mg(1,250mg) -200 unit per tablet, Take 1 tablet by mouth once daily.  , Disp: , Rfl:     cholecalciferol, vitamin D3, (VITAMIN D3) 2,000 unit Cap, Take 1 capsule by mouth once daily., Disp: , Rfl:     fish oil-omega-3 fatty acids 300-1,000 mg capsule, Take 2 g by mouth once daily., Disp: , Rfl:     GLUCOSAMINE/MSM/CHONDROIT  SULF (GLUCOSAMINE 2KCL-MSM-CHONDROIT ORAL), Take by mouth., Disp: , Rfl:     levothyroxine (SYNTHROID) 88 MCG tablet, Take 1 tablet (88 mcg total) by mouth once daily., Disp: 90 tablet, Rfl: 3    loratadine (CLARITIN) 10 mg tablet, Take 10 mg by mouth daily as needed. , Disp: , Rfl:   Review of patient's allergies indicates:   Allergen Reactions    Demerol (pf) [meperidine (pf)] Nausea And Vomiting    Erythromycin Other (See Comments)     Stomach cramps    Lorabid [loracarbef] Nausea And Vomiting    Penicillin g Hives    Fosamax [alendronate] Other (See Comments)     Bone pain       Family History   Problem Relation Age of Onset    Breast cancer Other 40    Heart disease Mother     Cancer Father      Social History     Social History    Marital status:      Spouse name: N/A    Number of children: N/A    Years of education: N/A     Occupational History    Not on file.     Social History Main Topics    Smoking status: Never Smoker    Smokeless tobacco: Never Used    Alcohol use No    Drug use: No    Sexual activity: Not on file     Other Topics Concern    Not on file     Social History Narrative    No narrative on file     Review of Systems   Constitutional: Negative for activity change, chills, fever and unexpected weight change.   HENT: Negative for congestion, sore throat, trouble swallowing and voice change.    Eyes: Negative for redness and visual disturbance.   Respiratory: Negative for cough, shortness of breath and wheezing.    Cardiovascular: Negative for chest pain and palpitations.   Gastrointestinal: Negative for abdominal pain, blood in stool, nausea and vomiting.   Endocrine: Negative.    Genitourinary: Negative for dysuria, frequency and hematuria.   Musculoskeletal: Negative for arthralgias, back pain and neck pain.   Skin: Negative for rash and wound.   Allergic/Immunologic: Negative.    Neurological: Negative for dizziness, weakness and headaches.   Hematological: Negative  for adenopathy.   Psychiatric/Behavioral: Negative for agitation and dysphoric mood. The patient is not nervous/anxious.      Objective:     Physical Exam   Constitutional: She is oriented to person, place, and time. She appears well-developed and well-nourished. No distress.   HENT:   Head: Normocephalic and atraumatic.   Eyes: Conjunctivae and EOM are normal. Pupils are equal, round, and reactive to light. No scleral icterus.   Neck: Normal range of motion.   Cardiovascular: Normal rate and regular rhythm.    No murmur heard.  Pulmonary/Chest: Effort normal and breath sounds normal. She has no wheezes. She has no rales.   Right Breast Exam:  There are no palpable dominant masses.  There are no overlying skin changes.  There is no contour change of the breast.  There is no nipple discharge.  There is some breast tenderness in the tamanna areolar area secondary to biopsy.  There is no palpable axillary or supraclavicular adenopathy.    There is no abnormality detected on physical exam of the contralateral breast.   Abdominal: Soft. Bowel sounds are normal. She exhibits no distension and no mass. There is no tenderness. No hernia.   Musculoskeletal: Normal range of motion. She exhibits no edema.   Neurological: She is alert and oriented to person, place, and time. No cranial nerve deficit.   Skin: Skin is warm and dry. No rash noted. No erythema.   Psychiatric: She has a normal mood and affect. Her behavior is normal.     FINAL PATHOLOGIC DIAGNOSIS  1. Right breast, biopsy 6:00 1 cm from nipple:  -Atypical papillary lesion (see comment).    Assessment:     Encounter Diagnoses   Name Primary?    Preop testing Yes    Breast mass in female        Plan:      1. Plan Wire localized lumpectomy of right breast.  2. Risks and benefits of the planned procedure were discussed at length with the patient.  Risks and benefits of not proceeding with the procedure were discussed as well. All questions were answered. The patient  expressed clear understanding and would like to proceed with the procedure as discussed.

## 2017-10-23 NOTE — TELEPHONE ENCOUNTER
..Patient notified of breast biopsy results     FINAL PATHOLOGIC DIAGNOSIS  1. Right breast, biopsy 6:00 1 cm from nipple:  -Atypical papillary lesion (see comment).  Comment: The histologic section shows fragments of a papillary breast lesion, however the relationship to the  surrounding stroma is not apparent. Immunohistochemical stain for p63 is negative throughout the lesion. 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    Appointment scheduled 10/23/2017 with Jonnathan Joseph

## 2017-10-23 NOTE — LETTER
October 23, 2017      SANTI Nogueira MD  [address] [facility] [address]  [facility] [address]           [facility]  [address] [facility] [address] [place] [address]  Phone: [phone]          Patient: Sarahy Coronel   MR Number: [id number]   YOB: 1945   Date of Visit: 10/23/2017       Dear Dr. SANTI Nogueira:    Thank you for referring Dymple Seal to me for evaluation. Attached you will find relevant portions of my assessment and plan of care.    If you have questions, please do not hesitate to call me. I look forward to following Sarahy Coronel along with you.    Sincerely,    Pasha Joseph MD    Enclosure  CC:  No Recipients    If you would like to receive this communication electronically, please contact [email or web address] or [phone] to request more information on [employer] access.    For providers and/or their staff who would like to refer a patient to [facility], please contact us through our one-stop-shop provider referral line, [facility], at [phone].    If you feel you have received this communication in error or would no longer like to receive these types of communications, please e-mail [email or web address]

## 2017-10-23 NOTE — TELEPHONE ENCOUNTER
----- Message from Melissa Holland sent at 10/20/2017 10:00 AM CDT -----  Test results.  Please call patient at [phone].

## 2017-10-25 NOTE — TELEPHONE ENCOUNTER
Pt already seen by Dr. Joseph. Pt amanda to have lumpectomy on 10/30/17. Pt verbalized understanding.

## 2017-10-27 ENCOUNTER — ANESTHESIA EVENT (OUTPATIENT)
Dept: SURGERY | Facility: HOSPITAL | Age: 72
End: 2017-10-27
Payer: MEDICARE

## 2017-10-30 ENCOUNTER — HOSPITAL ENCOUNTER (OUTPATIENT)
Dept: RADIOLOGY | Facility: HOSPITAL | Age: 72
Discharge: HOME | End: 2017-10-30
Attending: SURGERY | Admitting: SURGERY
Payer: MEDICARE

## 2017-10-30 ENCOUNTER — HOSPITAL ENCOUNTER (OUTPATIENT)
Facility: HOSPITAL | Age: 72
Discharge: HOME | End: 2017-10-30
Attending: SURGERY | Admitting: SURGERY
Payer: MEDICARE

## 2017-10-30 ENCOUNTER — ANESTHESIA (OUTPATIENT)
Dept: SURGERY | Facility: HOSPITAL | Age: 72
End: 2017-10-30
Payer: MEDICARE

## 2017-10-30 ENCOUNTER — HOSPITAL ENCOUNTER (OUTPATIENT)
Dept: RADIOLOGY | Facility: HOSPITAL | Age: 72
Discharge: HOME | End: 2017-10-30
Payer: MEDICARE

## 2017-10-30 ENCOUNTER — SURGERY (OUTPATIENT)
Age: 72
End: 2017-10-30

## 2017-10-30 DIAGNOSIS — R92.8 ABNORMAL MAMMOGRAM OF RIGHT BREAST: ICD-10-CM

## 2017-10-30 DIAGNOSIS — N63.0 BREAST MASS IN FEMALE: Primary | ICD-10-CM

## 2017-10-30 PROCEDURE — 76098 X-RAY EXAM SURGICAL SPECIMEN: CPT | Mod: TC

## 2017-10-30 PROCEDURE — A4648 IMPLANTABLE TISSUE MARKER: HCPCS | Mod: PO

## 2017-10-30 PROCEDURE — S0077 INJECTION, CLINDAMYCIN PHOSP: HCPCS | Mod: PO | Performed by: SURGERY

## 2017-10-30 PROCEDURE — 88342 IMHCHEM/IMCYTCHM 1ST ANTB: CPT | Mod: 26,59,,

## 2017-10-30 PROCEDURE — 19301 PARTIAL MASTECTOMY: CPT | Mod: RT,,, | Performed by: SURGERY

## 2017-10-30 PROCEDURE — 76098 X-RAY EXAM SURGICAL SPECIMEN: CPT | Mod: 26,,, | Performed by: RADIOLOGY

## 2017-10-30 PROCEDURE — 63600175 PHARM REV CODE 636 W HCPCS: Mod: PO | Performed by: ANESTHESIOLOGY

## 2017-10-30 PROCEDURE — 19285 PERQ DEV BREAST 1ST US IMAG: CPT | Mod: RT,,, | Performed by: RADIOLOGY

## 2017-10-30 PROCEDURE — 36000707: Mod: PO | Performed by: SURGERY

## 2017-10-30 PROCEDURE — 71000033 HC RECOVERY, INTIAL HOUR: Mod: PO | Performed by: SURGERY

## 2017-10-30 PROCEDURE — 63600175 PHARM REV CODE 636 W HCPCS: Mod: PO | Performed by: NURSE ANESTHETIST, CERTIFIED REGISTERED

## 2017-10-30 PROCEDURE — 25000003 PHARM REV CODE 250: Mod: PO | Performed by: ANESTHESIOLOGY

## 2017-10-30 PROCEDURE — D9220A PRA ANESTHESIA: Mod: CRNA,,, | Performed by: NURSE ANESTHETIST, CERTIFIED REGISTERED

## 2017-10-30 PROCEDURE — 36000706: Mod: PO | Performed by: SURGERY

## 2017-10-30 PROCEDURE — 37000009 HC ANESTHESIA EA ADD 15 MINS: Mod: PO | Performed by: SURGERY

## 2017-10-30 PROCEDURE — 37000008 HC ANESTHESIA 1ST 15 MINUTES: Mod: PO | Performed by: SURGERY

## 2017-10-30 PROCEDURE — 88360 TUMOR IMMUNOHISTOCHEM/MANUAL: CPT | Mod: 26,,,

## 2017-10-30 PROCEDURE — 88307 TISSUE EXAM BY PATHOLOGIST: CPT

## 2017-10-30 PROCEDURE — 25000003 PHARM REV CODE 250: Mod: PO | Performed by: SURGERY

## 2017-10-30 PROCEDURE — D9220A PRA ANESTHESIA: Mod: ANES,,, | Performed by: ANESTHESIOLOGY

## 2017-10-30 PROCEDURE — 88307 TISSUE EXAM BY PATHOLOGIST: CPT | Mod: 26,,,

## 2017-10-30 RX ORDER — FENTANYL CITRATE 50 UG/ML
INJECTION, SOLUTION INTRAMUSCULAR; INTRAVENOUS
Status: DISCONTINUED | OUTPATIENT
Start: 2017-10-30 | End: 2017-10-30

## 2017-10-30 RX ORDER — PROPOFOL 10 MG/ML
VIAL (ML) INTRAVENOUS
Status: DISCONTINUED | OUTPATIENT
Start: 2017-10-30 | End: 2017-10-30

## 2017-10-30 RX ORDER — SODIUM CHLORIDE, SODIUM LACTATE, POTASSIUM CHLORIDE, CALCIUM CHLORIDE 600; 310; 30; 20 MG/100ML; MG/100ML; MG/100ML; MG/100ML
1000 INJECTION, SOLUTION INTRAVENOUS ONCE
Status: DISCONTINUED | OUTPATIENT
Start: 2017-10-30 | End: 2017-10-30 | Stop reason: HOSPADM

## 2017-10-30 RX ORDER — HYDROCODONE BITARTRATE AND ACETAMINOPHEN 7.5; 325 MG/1; MG/1
1 TABLET ORAL EVERY 4 HOURS PRN
Qty: 25 TABLET | Refills: 0 | Status: SHIPPED | OUTPATIENT
Start: 2017-10-30 | End: 2017-11-09

## 2017-10-30 RX ORDER — HYDROCODONE BITARTRATE AND ACETAMINOPHEN 5; 325 MG/1; MG/1
1 TABLET ORAL EVERY 4 HOURS PRN
Status: DISCONTINUED | OUTPATIENT
Start: 2017-10-30 | End: 2017-10-30 | Stop reason: HOSPADM

## 2017-10-30 RX ORDER — SODIUM CHLORIDE 9 MG/ML
INJECTION, SOLUTION INTRAVENOUS CONTINUOUS
Status: DISCONTINUED | OUTPATIENT
Start: 2017-10-30 | End: 2017-10-30

## 2017-10-30 RX ORDER — MIDAZOLAM HYDROCHLORIDE 1 MG/ML
INJECTION, SOLUTION INTRAMUSCULAR; INTRAVENOUS
Status: DISCONTINUED | OUTPATIENT
Start: 2017-10-30 | End: 2017-10-30

## 2017-10-30 RX ORDER — SODIUM CHLORIDE, SODIUM LACTATE, POTASSIUM CHLORIDE, CALCIUM CHLORIDE 600; 310; 30; 20 MG/100ML; MG/100ML; MG/100ML; MG/100ML
INJECTION, SOLUTION INTRAVENOUS CONTINUOUS
Status: DISCONTINUED | OUTPATIENT
Start: 2017-10-30 | End: 2017-10-30 | Stop reason: HOSPADM

## 2017-10-30 RX ORDER — ONDANSETRON 2 MG/ML
INJECTION INTRAMUSCULAR; INTRAVENOUS
Status: DISCONTINUED | OUTPATIENT
Start: 2017-10-30 | End: 2017-10-30

## 2017-10-30 RX ORDER — OXYCODONE HYDROCHLORIDE 5 MG/1
5 TABLET ORAL ONCE AS NEEDED
Status: DISCONTINUED | OUTPATIENT
Start: 2017-10-30 | End: 2017-10-30 | Stop reason: HOSPADM

## 2017-10-30 RX ORDER — ONDANSETRON 2 MG/ML
4 INJECTION INTRAMUSCULAR; INTRAVENOUS ONCE AS NEEDED
Status: DISCONTINUED | OUTPATIENT
Start: 2017-10-30 | End: 2017-10-30 | Stop reason: HOSPADM

## 2017-10-30 RX ORDER — DEXAMETHASONE SODIUM PHOSPHATE 4 MG/ML
8 INJECTION, SOLUTION INTRA-ARTICULAR; INTRALESIONAL; INTRAMUSCULAR; INTRAVENOUS; SOFT TISSUE
Status: COMPLETED | OUTPATIENT
Start: 2017-10-30 | End: 2017-10-30

## 2017-10-30 RX ORDER — FENTANYL CITRATE 50 UG/ML
25 INJECTION, SOLUTION INTRAMUSCULAR; INTRAVENOUS EVERY 5 MIN PRN
Status: DISCONTINUED | OUTPATIENT
Start: 2017-10-30 | End: 2017-10-30 | Stop reason: HOSPADM

## 2017-10-30 RX ORDER — BUPIVACAINE HYDROCHLORIDE AND EPINEPHRINE 2.5; 5 MG/ML; UG/ML
INJECTION, SOLUTION EPIDURAL; INFILTRATION; INTRACAUDAL; PERINEURAL
Status: DISCONTINUED | OUTPATIENT
Start: 2017-10-30 | End: 2017-10-30 | Stop reason: HOSPADM

## 2017-10-30 RX ORDER — LIDOCAINE HCL/PF 100 MG/5ML
SYRINGE (ML) INTRAVENOUS
Status: DISCONTINUED | OUTPATIENT
Start: 2017-10-30 | End: 2017-10-30

## 2017-10-30 RX ORDER — LIDOCAINE HYDROCHLORIDE 10 MG/ML
1 INJECTION, SOLUTION EPIDURAL; INFILTRATION; INTRACAUDAL; PERINEURAL ONCE
Status: DISCONTINUED | OUTPATIENT
Start: 2017-10-30 | End: 2017-10-30 | Stop reason: HOSPADM

## 2017-10-30 RX ORDER — SODIUM CHLORIDE 9 MG/ML
INJECTION, SOLUTION INTRAVENOUS CONTINUOUS
Status: DISCONTINUED | OUTPATIENT
Start: 2017-10-30 | End: 2017-10-30 | Stop reason: HOSPADM

## 2017-10-30 RX ADMIN — ONDANSETRON 4 MG: 2 INJECTION, SOLUTION INTRAMUSCULAR; INTRAVENOUS at 09:10

## 2017-10-30 RX ADMIN — SODIUM CHLORIDE, SODIUM LACTATE, POTASSIUM CHLORIDE, AND CALCIUM CHLORIDE: .6; .31; .03; .02 INJECTION, SOLUTION INTRAVENOUS at 07:10

## 2017-10-30 RX ADMIN — LIDOCAINE HYDROCHLORIDE 100 MG: 20 INJECTION PARENTERAL at 09:10

## 2017-10-30 RX ADMIN — DEXTROSE 900 MG: 50 INJECTION, SOLUTION INTRAVENOUS at 08:10

## 2017-10-30 RX ADMIN — MIDAZOLAM HYDROCHLORIDE 1 MG: 1 INJECTION, SOLUTION INTRAMUSCULAR; INTRAVENOUS at 09:10

## 2017-10-30 RX ADMIN — PROPOFOL 150 MG: 10 INJECTION, EMULSION INTRAVENOUS at 09:10

## 2017-10-30 RX ADMIN — BUPIVACAINE HYDROCHLORIDE AND EPINEPHRINE BITARTRATE 20 ML: 2.5; .0091 INJECTION, SOLUTION EPIDURAL; INFILTRATION; INTRACAUDAL; PERINEURAL at 10:10

## 2017-10-30 RX ADMIN — FENTANYL CITRATE 50 MCG: 50 INJECTION, SOLUTION INTRAMUSCULAR; INTRAVENOUS at 09:10

## 2017-10-30 RX ADMIN — DEXAMETHASONE SODIUM PHOSPHATE 8 MG: 4 INJECTION, SOLUTION INTRAMUSCULAR; INTRAVENOUS at 07:10

## 2017-10-30 RX ADMIN — PROPOFOL 50 MG: 10 INJECTION, EMULSION INTRAVENOUS at 09:10

## 2017-10-30 NOTE — ANESTHESIA PREPROCEDURE EVALUATION
10/30/2017  Sarahy Coronel is a 72 y.o., female.    Anesthesia Evaluation    I have reviewed the Patient Summary Reports.    I have reviewed the Nursing Notes.   I have reviewed the Medications.     Review of Systems  Anesthesia Hx:  Hx of Anesthetic complications NIECE with MH.  Pt had general anesthetic without issues in past.   H/O PONV   Social:  Non-Smoker    Hematology/Oncology:  Hematology Normal   Oncology Normal     EENT/Dental:   Lower partial denture   Cardiovascular:   Valvular problems/Murmurs, MR hyperlipidemia    Pulmonary:  Pulmonary Normal    Hepatic/GI:   GERD    Musculoskeletal:  Musculoskeletal Normal    Neurological:  Neurology Normal    Endocrine:  Endocrine Normal    Dermatological:  Skin Normal    Psych:  Psychiatric Normal           Physical Exam  General:  Well nourished    Airway/Jaw/Neck:  Airway Findings: Mouth Opening: Normal Tongue: Normal  General Airway Assessment: Adult  Mallampati: I        Eyes/Ears/Nose:  EYES/EARS/NOSE FINDINGS: Normal   Dental:  Dental Findings: lower partial dentures   Chest/Lungs:  Chest/Lungs Findings: Clear to auscultation, Normal Respiratory Rate     Heart/Vascular:  Heart Findings: Rate: Normal  Rhythm: Regular Rhythm  Sounds: Normal     Abdomen:  Abdomen Findings: Normal    Musculoskeletal:  Musculoskeletal Findings: Normal   Skin:  Skin Findings: Normal    Mental Status:  Mental Status Findings: Normal        Anesthesia Plan  Type of Anesthesia, risks & benefits discussed:  Anesthesia Type:  general  Patient's Preference:   Intra-op Monitoring Plan: standard ASA monitors  Intra-op Monitoring Plan Comments:   Post Op Pain Control Plan: IV/PO Opioids PRN  Post Op Pain Control Plan Comments:   Induction:   IV  Beta Blocker:  Patient is not currently on a Beta-Blocker (No further documentation required).       Informed Consent: Patient understands  risks and agrees with Anesthesia plan.  Questions answered. Anesthesia consent signed with patient.  ASA Score: 2     Day of Surgery Review of History & Physical: I have interviewed and examined the patient. I have reviewed the patient's H&P dated:    H&P update referred to the surgeon.         Ready For Surgery From Anesthesia Perspective.

## 2017-10-30 NOTE — PLAN OF CARE
Patient tolerating oral liquids without difficulty. No apparent s&s of distress noted at this time. Pt states pain 2/10 to right breast but states it is tolerable and refuses pain medication.   Dressing C,D,I to right breast.  Discharge instructions reviewed with patient/family/friend with good verbal feedback received. Patient ready for discharge

## 2017-10-30 NOTE — BRIEF OP NOTE
Patient: Sarahy Coronel     Date of Procedure: 10/30/2017    Procedure: Right partial mastectomy with wire localization    Surgeon: Pasha Starr MD    Assistant: None    Pre-op Diagnosis: Breast mass in female [N63.0]     Post-op Diagnosis: Breast mass in female [N63.0]    Findings: breast tissue    Specimen: breast mass    EBL: 10 cc    Complications: None

## 2017-10-30 NOTE — DISCHARGE SUMMARY
Discharge Summary  General Surgery      Admit Date: 10/30/2017    Discharge Date :10/30/2017    Attending Physician: Pasha Joseph     Discharge Physician: Pasha Joseph    Discharged Condition: good    Discharge Diagnosis: Breast mass in female [N63.0]    Treatments/Procedures: Procedure(s) (LRB):  LUMPECTOMY-BREAST (Right)  LOCALIZATION-WIRE (Right)    Hospital Course: Uneventful; Discharged home from Recovery    Significant Diagnostic Studies: none    Disposition: Home or Self Care    Diet: Regular    Follow up: Office 10-14 days    Activity: No heavy lifting till seen in office.    Patient Instructions:   Current Discharge Medication List      START taking these medications    Details   hydrocodone-acetaminophen 7.5-325mg (NORCO) 7.5-325 mg per tablet Take 1 tablet by mouth every 4 (four) hours as needed for Pain.  Qty: 25 tablet, Refills: 0         CONTINUE these medications which have NOT CHANGED    Details   aspirin (ECOTRIN) 81 MG EC tablet Take 81 mg by mouth once daily.      calcium-vitamin D (CALCIUM-VITAMIN D) 500 mg(1,250mg) -200 unit per tablet Take 1 tablet by mouth once daily.        cholecalciferol, vitamin D3, (VITAMIN D3) 2,000 unit Cap Take 1 capsule by mouth once daily.      GLUCOSAMINE/MSM/CHONDROIT SULF (GLUCOSAMINE 2KCL-MSM-CHONDROIT ORAL) Take by mouth.      levothyroxine (SYNTHROID) 88 MCG tablet Take 1 tablet (88 mcg total) by mouth once daily.  Qty: 90 tablet, Refills: 3      loratadine (CLARITIN) 10 mg tablet Take 10 mg by mouth daily as needed.       fish oil-omega-3 fatty acids 300-1,000 mg capsule Take 2 g by mouth once daily.               Discharge Procedure Orders  Diet general     Activity as tolerated     Call MD for:  temperature >100.4     Call MD for:  persistent nausea and vomiting     Call MD for:  severe uncontrolled pain     Remove dressing in 48 hours

## 2017-10-30 NOTE — OP NOTE
DATE OF PROCEDURE:  10/30/2017.    PROCEDURE:  Right partial mastectomy with wire localization.    PREOPERATIVE DIAGNOSIS:  Papillary lesion of the right breast.    POSTOPERATIVE DIAGNOSIS:  Papillary lesion of the right breast.    SURGEON:  Pasha Joseph Jr., M.D.    ASSISTANT:  None.    SPECIMEN:  Right breast mass.    PROCEDURE IN DETAIL:  After appropriate consent was obtained, consent forms   signed and questions answered, the patient was taken to the Operating Room,   placed on the operating room table where general anesthesia was induced via   laryngeal mask.  Preoperative antibiotics were administered.  SCDs were in   place.  A timeout procedure was performed.  The patient had previously had wire   localization performed in Radiology and those films were reviewed.  A   periareolar skin incision was made and dissection performed into the breast   tissue.  Dissection was performed removing tissue encasing the wire.  The   specimen was oriented for the pathologist.  Specimen radiograph confirmed the   lesion and clip to be within the excised specimen.  It was then sent to   Pathology.  Then, 0.5% Marcaine was injected for local anesthetic and then   adequate hemostasis was ensured after irrigation.  Dermis and subcutaneous   tissues were reapproximated with interrupted 3-0 Vicryl suture and the skin was   closed with 4-0 Monocryl subcuticular stitch.  Steri-Strips and dressings were   applied.  The patient was awakened, extubated and transferred to the Recovery   Room in stable condition.  She tolerated the procedure without complication.    Blood loss was approximately 10 mL.  Counts were correct at the end of the   procedure.      RTL/HN  dd: 10/30/2017 10:05:31 (CDT)  td: 10/30/2017 10:55:59 (CDT)  Doc ID   #[id number]  Job ID #[id number]    CC:

## 2017-10-30 NOTE — TRANSFER OF CARE
"Anesthesia Transfer of Care Note    Patient: Sarahy Coronel    Procedure(s) Performed: Procedure(s) (LRB):  LUMPECTOMY-BREAST (Right)  LOCALIZATION-WIRE (Right)    Patient location: PACU    Anesthesia Type: general    Transport from OR: Transported from OR on room air with adequate spontaneous ventilation    Post pain: adequate analgesia    Post assessment: no apparent anesthetic complications and tolerated procedure well    Post vital signs: stable    Level of consciousness: lethargic and responds to stimulation    Nausea/Vomiting: no nausea/vomiting    Complications: none    Transfer of care protocol was followed      Last vitals:   Visit Vitals  BP (!) 186/79 (BP Location: Right arm, Patient Position: Lying)   Pulse 60   Temp 36.1 °C (97 °F) (Skin)   Resp 16   Ht 5' 6" (1.676 m)   Wt 70.8 kg (156 lb)   SpO2 99%   Breastfeeding? No   BMI 25.18 kg/m²     "

## 2017-10-30 NOTE — DISCHARGE INSTRUCTIONS
Department of General Surgery  [facility]    BREAST BIOPSY    DOS:   Wear bra day and night for one week.   May shower in 24 hours   May remove outer dressing in 48 hours. If steri-strips present leave in place. Pat dry if they get  wet. Do not worry if they fall off.   Minimal activity for 48 hours.   Advance diet as tolerated   Apply ice pack for 24 hours off and on for comfort.   Resume home medications as prescribed    DONT:   Do not soak in the tub   No driving for 24 hours or while taking narcotic pain medication   DO NOT TAKE ADDITIONAL TYLENOL/ACETAMINOPHEN WHILE TAKING NARCOTIC PAIN MEDICATION THAT CONTAINS TYLENOL/ACETAMINOPHEN.    CALL PHYSICIAN FOR:   Redness or bleeding.   Fever greater then 101.   Drainage from the incision site that appears infected.   Persistent pain not relieved by pain medication.    RETURN APPOINTMENT: Call to schedule appointment in 2 weeks    FOR EMERGENCIES: Call your physician at [phone]      Discharge Instructions: After Your Surgery  Youve just had surgery. During surgery, you were given medicine called anesthesia to keep you relaxed and free of pain. After surgery, you may have some pain or nausea. This is common. Here are some tips for feeling better and getting well after surgery.     Stay on schedule with your medicine.   Going home  Your healthcare provider will show you how to take care of yourself when you go home. He or she will also answer your questions. Have an adult family member or friend drive you home. For the first 24 hours after your surgery:  · Do not drive or use heavy equipment.  · Do not make important decisions or sign legal papers.  · Do not drink alcohol.  · Have someone stay with you, if needed. He or she can watch for problems and help keep you safe.  Be sure to go to all follow-up visits with your healthcare provider. And rest after your surgery for as long as your healthcare provider tells you to.  Coping with  pain  If you have pain after surgery, pain medicine will help you feel better. Take it as told, before pain becomes severe. Also, ask your healthcare provider or pharmacist about other ways to control pain. This might be with heat, ice, or relaxation. And follow any other instructions your surgeon or nurse gives you.  Tips for taking pain medicine  To get the best relief possible, remember these points:  · Pain medicines can upset your stomach. Taking them with a little food may help.  · Most pain relievers taken by mouth need at least 20 to 30 minutes to start to work.  · Taking medicine on a schedule can help you remember to take it. Try to time your medicine so that you can take it before starting an activity. This might be before you get dressed, go for a walk, or sit down for dinner.  · Constipation is a common side effect of pain medicines. Call your healthcare provider before taking any medicines such as laxatives or stool softeners to help ease constipation. Also ask if you should skip any foods. Drinking lots of fluids and eating foods such as fruits and vegetables that are high in fiber can also help. Remember, do not take laxatives unless your surgeon has prescribed them.  · Drinking alcohol and taking pain medicine can cause dizziness and slow your breathing. It can even be deadly. Do not drink alcohol while taking pain medicine.  · Pain medicine can make you react more slowly to things. Do not drive or run machinery while taking pain medicine.  Your healthcare provider may tell you to take acetaminophen to help ease your pain. Ask him or her how much you are supposed to take each day. Acetaminophen or other pain relievers may interact with your prescription medicines or other over-the-counter (OTC) medicines. Some prescription medicines have acetaminophen and other ingredients. Using both prescription and OTC acetaminophen for pain can cause you to overdose. Read the labels on your OTC medicines with  care. This will help you to clearly know the list of ingredients, how much to take, and any warnings. It may also help you not take too much acetaminophen. If you have questions or do not understand the information, ask your pharmacist or healthcare provider to explain it to you before you take the OTC medicine.  Managing nausea  Some people have an upset stomach after surgery. This is often because of anesthesia, pain, or pain medicine, or the stress of surgery. These tips will help you handle nausea and eat healthy foods as you get better. If you were on a special food plan before surgery, ask your healthcare provider if you should follow it while you get better. These tips may help:  · Do not push yourself to eat. Your body will tell you when to eat and how much.  · Start off with clear liquids and soup. They are easier to digest.  · Next try semi-solid foods, such as mashed potatoes, applesauce, and gelatin, as you feel ready.  · Slowly move to solid foods. Dont eat fatty, rich, or spicy foods at first.  · Do not force yourself to have 3 large meals a day. Instead eat smaller amounts more often.  · Take pain medicines with a small amount of solid food, such as crackers or toast, to avoid nausea.     Call your surgeon if  · You still have pain an hour after taking medicine. The medicine may not be strong enough.  · You feel too sleepy, dizzy, or groggy. The medicine may be too strong.  · You have side effects like nausea, vomiting, or skin changes, such as rash, itching, or hives.       If you have obstructive sleep apnea  You were given anesthesia medicine during surgery to keep you comfortable and free of pain. After surgery, you may have more apnea spells because of this medicine and other medicines you were given. The spells may last longer than usual.   At home:  · Keep using the continuous positive airway pressure (CPAP) device when you sleep. Unless your healthcare provider tells you not to, use it when  you sleep, day or night. CPAP is a common device used to treat obstructive sleep apnea.  · Talk with your provider before taking any pain medicine, muscle relaxants, or sedatives. Your provider will tell you about the possible dangers of taking these medicines.  Date Last Reviewed: 12/1/2016  © 7141-7643 [employer]. [address] [place] [address], [place] [address]. All rights reserved. This information is not intended as a substitute for professional medical care. Always follow your healthcare professional's instructions.

## 2017-10-30 NOTE — INTERVAL H&P NOTE
The patient has been examined and the H&P has been reviewed:    I concur with the findings and no changes have occurred since H&P was written.    Anesthesia/Surgery risks, benefits and alternative options discussed and understood by patient/family.          Active Hospital Problems    Diagnosis  POA    Breast mass in female [N63.0]  Yes      Resolved Hospital Problems    Diagnosis Date Resolved POA   No resolved problems to display.

## 2017-10-31 VITALS
HEART RATE: 63 BPM | HEIGHT: 66 IN | DIASTOLIC BLOOD PRESSURE: 76 MMHG | RESPIRATION RATE: 20 BRPM | SYSTOLIC BLOOD PRESSURE: 144 MMHG | OXYGEN SATURATION: 100 % | BODY MASS INDEX: 25.07 KG/M2 | TEMPERATURE: 97 F | WEIGHT: 156 LBS

## 2017-10-31 NOTE — ANESTHESIA POSTPROCEDURE EVALUATION
"Anesthesia Post Evaluation    Patient: Sarahy Coronel    Procedure(s) Performed: Procedure(s) (LRB):  LUMPECTOMY-BREAST (Right)  LOCALIZATION-WIRE (Right)    Final Anesthesia Type: general  Patient location during evaluation: PACU  Patient participation: Yes- Able to Participate  Level of consciousness: awake and alert  Post-procedure vital signs: reviewed and stable  Pain management: adequate  Airway patency: patent  PONV status at discharge: No PONV  Anesthetic complications: no      Cardiovascular status: hemodynamically stable  Respiratory status: unassisted and room air  Hydration status: euvolemic  Follow-up not needed.        Visit Vitals  BP (!) 144/76   Pulse 63   Temp 36.2 °C (97.2 °F) (Skin)   Resp 20   Ht 5' 6" (1.676 m)   Wt 70.8 kg (156 lb)   SpO2 100%   Breastfeeding? No   BMI 25.18 kg/m²       Pain/Leonie Score: Pain Assessment Performed: Yes (10/30/2017 10:51 AM)  Presence of Pain: complains of pain/discomfort (10/30/2017 10:51 AM)  Leonie Score: 10 (10/30/2017 10:51 AM)      "

## 2017-11-02 ENCOUNTER — TELEPHONE (OUTPATIENT)
Dept: FAMILY MEDICINE | Facility: CLINIC | Age: 72
End: 2017-11-02

## 2017-11-02 NOTE — TELEPHONE ENCOUNTER
----- Message from Rowan Rouse sent at 10/31/2017  3:50 PM CDT -----  Contact: Patient  Patient is calling to see if she could possibly move her appointment from 11/14 to 11/13; she is coming in to see another doctor on 11/13.  Call Back#[phone]  Thanks

## 2017-11-13 ENCOUNTER — OFFICE VISIT (OUTPATIENT)
Dept: FAMILY MEDICINE | Facility: CLINIC | Age: 72
End: 2017-11-13
Payer: MEDICARE

## 2017-11-13 ENCOUNTER — OFFICE VISIT (OUTPATIENT)
Dept: SURGERY | Facility: CLINIC | Age: 72
End: 2017-11-13
Payer: MEDICARE

## 2017-11-13 VITALS — BODY MASS INDEX: 25.55 KG/M2 | TEMPERATURE: 99 F | WEIGHT: 158.31 LBS

## 2017-11-13 VITALS
HEIGHT: 66 IN | BODY MASS INDEX: 25.44 KG/M2 | HEART RATE: 88 BPM | SYSTOLIC BLOOD PRESSURE: 136 MMHG | WEIGHT: 158.31 LBS | RESPIRATION RATE: 16 BRPM | DIASTOLIC BLOOD PRESSURE: 82 MMHG

## 2017-11-13 DIAGNOSIS — E03.9 ACQUIRED HYPOTHYROIDISM: ICD-10-CM

## 2017-11-13 DIAGNOSIS — E78.5 HYPERLIPIDEMIA, UNSPECIFIED HYPERLIPIDEMIA TYPE: Primary | ICD-10-CM

## 2017-11-13 DIAGNOSIS — C50.919 BREAST CANCER: ICD-10-CM

## 2017-11-13 DIAGNOSIS — K21.9 GASTROESOPHAGEAL REFLUX DISEASE WITHOUT ESOPHAGITIS: ICD-10-CM

## 2017-11-13 DIAGNOSIS — C50.911 MALIGNANT NEOPLASM OF RIGHT FEMALE BREAST, UNSPECIFIED ESTROGEN RECEPTOR STATUS, UNSPECIFIED SITE OF BREAST: Primary | ICD-10-CM

## 2017-11-13 DIAGNOSIS — M81.0 OSTEOPOROSIS, UNSPECIFIED OSTEOPOROSIS TYPE, UNSPECIFIED PATHOLOGICAL FRACTURE PRESENCE: ICD-10-CM

## 2017-11-13 PROCEDURE — 99999 PR PBB SHADOW E&M-EST. PATIENT-LVL III: CPT | Mod: PBBFAC,,, | Performed by: SURGERY

## 2017-11-13 PROCEDURE — 99213 OFFICE O/P EST LOW 20 MIN: CPT | Mod: PBBFAC,PO | Performed by: FAMILY MEDICINE

## 2017-11-13 PROCEDURE — 99214 OFFICE O/P EST MOD 30 MIN: CPT | Mod: S$PBB,,, | Performed by: FAMILY MEDICINE

## 2017-11-13 PROCEDURE — 99024 POSTOP FOLLOW-UP VISIT: CPT | Mod: ,,, | Performed by: SURGERY

## 2017-11-13 PROCEDURE — 99999 PR PBB SHADOW E&M-EST. PATIENT-LVL III: CPT | Mod: PBBFAC,,, | Performed by: FAMILY MEDICINE

## 2017-11-13 PROCEDURE — 99213 OFFICE O/P EST LOW 20 MIN: CPT | Mod: PBBFAC,27,PO | Performed by: SURGERY

## 2017-11-13 RX ORDER — LEVOTHYROXINE SODIUM 88 UG/1
88 TABLET ORAL DAILY
Qty: 90 TABLET | Refills: 3 | Status: SHIPPED | OUTPATIENT
Start: 2017-11-13 | End: 2018-11-03 | Stop reason: SDUPTHER

## 2017-11-13 RX ORDER — SODIUM CHLORIDE 9 MG/ML
INJECTION, SOLUTION INTRAVENOUS CONTINUOUS
Status: CANCELLED | OUTPATIENT
Start: 2017-11-13

## 2017-11-13 NOTE — PROGRESS NOTES
Subjective:       Patient ID: Sarahy Coronel is a 72 y.o. female.    Chief Complaint: Hypothyroidism (Patient here for 6 month follow up)    Here for f/u chronic medical issues.  To have mastectomy with Dr. Joseph in near future.  gerd stable.  Insomnia stable.      Hyperlipidemia   This is a chronic problem. The current episode started more than 1 year ago. The problem is controlled. Pertinent negatives include no chest pain or shortness of breath.     Review of Systems   Constitutional: Negative for chills, fatigue and fever.   Respiratory: Negative for cough, chest tightness and shortness of breath.    Cardiovascular: Negative for chest pain, palpitations and leg swelling.   Gastrointestinal: Negative for abdominal distention and abdominal pain.   Endocrine: Negative for cold intolerance and heat intolerance.   Skin: Negative for rash.   Psychiatric/Behavioral: Negative for dysphoric mood. The patient is not nervous/anxious.        Objective:      Physical Exam   Constitutional: She appears well-developed and well-nourished.   HENT:   Head: Normocephalic and atraumatic.   Cardiovascular: Normal rate, regular rhythm and normal heart sounds.    Pulmonary/Chest: Effort normal and breath sounds normal.   Abdominal: Soft. Bowel sounds are normal.   Psychiatric: She has a normal mood and affect.   Nursing note and vitals reviewed.      Assessment:       1. Hyperlipidemia, unspecified hyperlipidemia type    2. Acquired hypothyroidism    3. Gastroesophageal reflux disease without esophagitis    4. Osteoporosis, unspecified osteoporosis type, unspecified pathological fracture presence        Plan:       Hyperlipidemia, unspecified hyperlipidemia type    Acquired hypothyroidism    Gastroesophageal reflux disease without esophagitis    Osteoporosis, unspecified osteoporosis type, unspecified pathological fracture presence    Other orders  -     levothyroxine (SYNTHROID) 88 MCG tablet; Take 1 tablet (88 mcg total) by mouth  once daily.  Dispense: 90 tablet; Refill: 3      Keep f/u with Dr. Joseph.  Will monitor chronic medical issues and continue current plan of care.        Return in about 3 months (around 2/13/2018), or if symptoms worsen or fail to improve.

## 2017-11-14 ENCOUNTER — TELEPHONE (OUTPATIENT)
Dept: FAMILY MEDICINE | Facility: CLINIC | Age: 72
End: 2017-11-14

## 2017-11-14 NOTE — TELEPHONE ENCOUNTER
----- Message from Martha Whitley sent at 11/14/2017  9:20 AM CST -----  Contact: self  Patient [phone] is returning call from yesterday 11 13 17 and is asking to please call her today around 4:30pm as she will not be home before then

## 2017-11-14 NOTE — TELEPHONE ENCOUNTER
----- Message from RT Walter sent at 11/14/2017  3:32 PM CST -----  Contact: pt    Called pod,pt , returned missed call, thanks.

## 2017-11-15 ENCOUNTER — ANESTHESIA EVENT (OUTPATIENT)
Dept: SURGERY | Facility: HOSPITAL | Age: 72
End: 2017-11-15
Payer: MEDICARE

## 2017-11-16 ENCOUNTER — HOSPITAL ENCOUNTER (OUTPATIENT)
Dept: RADIOLOGY | Facility: HOSPITAL | Age: 72
Discharge: HOME | End: 2017-11-16
Attending: SURGERY
Payer: MEDICARE

## 2017-11-16 ENCOUNTER — SURGERY (OUTPATIENT)
Age: 72
End: 2017-11-16

## 2017-11-16 ENCOUNTER — HOSPITAL ENCOUNTER (OUTPATIENT)
Facility: HOSPITAL | Age: 72
Discharge: HOME | End: 2017-11-17
Attending: SURGERY | Admitting: SURGERY
Payer: MEDICARE

## 2017-11-16 ENCOUNTER — ANESTHESIA (OUTPATIENT)
Dept: SURGERY | Facility: HOSPITAL | Age: 72
End: 2017-11-16
Payer: MEDICARE

## 2017-11-16 DIAGNOSIS — C50.911 MALIGNANT NEOPLASM OF RIGHT FEMALE BREAST, UNSPECIFIED ESTROGEN RECEPTOR STATUS, UNSPECIFIED SITE OF BREAST: ICD-10-CM

## 2017-11-16 DIAGNOSIS — C50.919 BREAST CANCER: Primary | ICD-10-CM

## 2017-11-16 DIAGNOSIS — C50.919 BREAST CANCER: ICD-10-CM

## 2017-11-16 PROCEDURE — 88342 IMHCHEM/IMCYTCHM 1ST ANTB: CPT | Mod: 26,,, | Performed by: PATHOLOGY

## 2017-11-16 PROCEDURE — 88331 PATH CONSLTJ SURG 1 BLK 1SPC: CPT | Mod: 26,,, | Performed by: PATHOLOGY

## 2017-11-16 PROCEDURE — 25000003 PHARM REV CODE 250: Performed by: SURGERY

## 2017-11-16 PROCEDURE — 71000039 HC RECOVERY, EACH ADD'L HOUR: Performed by: SURGERY

## 2017-11-16 PROCEDURE — 19303 MAST SIMPLE COMPLETE: CPT | Mod: 58,RT,, | Performed by: SURGERY

## 2017-11-16 PROCEDURE — 37000009 HC ANESTHESIA EA ADD 15 MINS: Performed by: SURGERY

## 2017-11-16 PROCEDURE — 88341 IMHCHEM/IMCYTCHM EA ADD ANTB: CPT | Mod: 26,,, | Performed by: PATHOLOGY

## 2017-11-16 PROCEDURE — 71000033 HC RECOVERY, INTIAL HOUR: Performed by: SURGERY

## 2017-11-16 PROCEDURE — 27200651 HC AIRWAY, LMA: Performed by: NURSE ANESTHETIST, CERTIFIED REGISTERED

## 2017-11-16 PROCEDURE — 36000707: Performed by: SURGERY

## 2017-11-16 PROCEDURE — 38792 RA TRACER ID OF SENTINL NODE: CPT | Mod: ,,, | Performed by: RADIOLOGY

## 2017-11-16 PROCEDURE — 99900104 DSU ONLY-NO CHARGE-EA ADD'L HR (STAT): Performed by: SURGERY

## 2017-11-16 PROCEDURE — 99900103 DSU ONLY-NO CHARGE-INITIAL HR (STAT): Performed by: SURGERY

## 2017-11-16 PROCEDURE — 38792 RA TRACER ID OF SENTINL NODE: CPT | Mod: TC

## 2017-11-16 PROCEDURE — D9220A PRA ANESTHESIA: Mod: CRNA,,, | Performed by: NURSE ANESTHETIST, CERTIFIED REGISTERED

## 2017-11-16 PROCEDURE — 37000008 HC ANESTHESIA 1ST 15 MINUTES: Performed by: SURGERY

## 2017-11-16 PROCEDURE — C1729 CATH, DRAINAGE: HCPCS | Performed by: SURGERY

## 2017-11-16 PROCEDURE — 25000003 PHARM REV CODE 250: Performed by: ANESTHESIOLOGY

## 2017-11-16 PROCEDURE — D9220A PRA ANESTHESIA: Mod: ANES,,, | Performed by: ANESTHESIOLOGY

## 2017-11-16 PROCEDURE — 25000003 PHARM REV CODE 250: Performed by: NURSE ANESTHETIST, CERTIFIED REGISTERED

## 2017-11-16 PROCEDURE — 38900 IO MAP OF SENT LYMPH NODE: CPT | Mod: RT,,, | Performed by: SURGERY

## 2017-11-16 PROCEDURE — 63600175 PHARM REV CODE 636 W HCPCS: Performed by: NURSE ANESTHETIST, CERTIFIED REGISTERED

## 2017-11-16 PROCEDURE — 38500 BIOPSY/REMOVAL LYMPH NODES: CPT | Mod: 58,51,RT, | Performed by: SURGERY

## 2017-11-16 PROCEDURE — 88307 TISSUE EXAM BY PATHOLOGIST: CPT | Performed by: PATHOLOGY

## 2017-11-16 PROCEDURE — 27201423 OPTIME MED/SURG SUP & DEVICES STERILE SUPPLY: Performed by: SURGERY

## 2017-11-16 PROCEDURE — 36000706: Performed by: SURGERY

## 2017-11-16 RX ORDER — SODIUM CHLORIDE, SODIUM LACTATE, POTASSIUM CHLORIDE, CALCIUM CHLORIDE 600; 310; 30; 20 MG/100ML; MG/100ML; MG/100ML; MG/100ML
INJECTION, SOLUTION INTRAVENOUS CONTINUOUS
Status: DISCONTINUED | OUTPATIENT
Start: 2017-11-16 | End: 2017-11-16

## 2017-11-16 RX ORDER — LIDOCAINE HCL/PF 100 MG/5ML
SYRINGE (ML) INTRAVENOUS
Status: DISCONTINUED | OUTPATIENT
Start: 2017-11-16 | End: 2017-11-16

## 2017-11-16 RX ORDER — CLINDAMYCIN PHOSPHATE 900 MG/50ML
900 INJECTION, SOLUTION INTRAVENOUS
Status: DISCONTINUED | OUTPATIENT
Start: 2017-11-16 | End: 2017-11-16 | Stop reason: HOSPADM

## 2017-11-16 RX ORDER — PANTOPRAZOLE SODIUM 40 MG/10ML
40 INJECTION, POWDER, LYOPHILIZED, FOR SOLUTION INTRAVENOUS
Status: DISCONTINUED | OUTPATIENT
Start: 2017-11-17 | End: 2017-11-17 | Stop reason: HOSPADM

## 2017-11-16 RX ORDER — MUPIROCIN 20 MG/G
1 OINTMENT TOPICAL 2 TIMES DAILY
Status: DISCONTINUED | OUTPATIENT
Start: 2017-11-16 | End: 2017-11-17 | Stop reason: HOSPADM

## 2017-11-16 RX ORDER — SODIUM CHLORIDE 9 MG/ML
INJECTION, SOLUTION INTRAVENOUS CONTINUOUS
Status: DISCONTINUED | OUTPATIENT
Start: 2017-11-16 | End: 2017-11-16

## 2017-11-16 RX ORDER — LEVOTHYROXINE SODIUM 88 UG/1
88 TABLET ORAL DAILY
Status: DISCONTINUED | OUTPATIENT
Start: 2017-11-16 | End: 2017-11-17 | Stop reason: HOSPADM

## 2017-11-16 RX ORDER — KETOROLAC TROMETHAMINE 30 MG/ML
INJECTION, SOLUTION INTRAMUSCULAR; INTRAVENOUS
Status: DISCONTINUED | OUTPATIENT
Start: 2017-11-16 | End: 2017-11-16

## 2017-11-16 RX ORDER — ONDANSETRON 2 MG/ML
4 INJECTION INTRAMUSCULAR; INTRAVENOUS DAILY PRN
Status: DISCONTINUED | OUTPATIENT
Start: 2017-11-16 | End: 2017-11-16 | Stop reason: HOSPADM

## 2017-11-16 RX ORDER — OXYCODONE HYDROCHLORIDE 5 MG/1
5 TABLET ORAL
Status: DISCONTINUED | OUTPATIENT
Start: 2017-11-16 | End: 2017-11-16 | Stop reason: HOSPADM

## 2017-11-16 RX ORDER — SODIUM CHLORIDE 9 MG/ML
INJECTION, SOLUTION INTRAVENOUS CONTINUOUS
Status: DISCONTINUED | OUTPATIENT
Start: 2017-11-16 | End: 2017-11-17 | Stop reason: HOSPADM

## 2017-11-16 RX ORDER — DEXAMETHASONE SODIUM PHOSPHATE 4 MG/ML
INJECTION, SOLUTION INTRA-ARTICULAR; INTRALESIONAL; INTRAMUSCULAR; INTRAVENOUS; SOFT TISSUE
Status: DISCONTINUED | OUTPATIENT
Start: 2017-11-16 | End: 2017-11-16

## 2017-11-16 RX ORDER — FENTANYL CITRATE 50 UG/ML
25 INJECTION, SOLUTION INTRAMUSCULAR; INTRAVENOUS EVERY 5 MIN PRN
Status: DISCONTINUED | OUTPATIENT
Start: 2017-11-16 | End: 2017-11-16 | Stop reason: HOSPADM

## 2017-11-16 RX ORDER — HYDROMORPHONE HYDROCHLORIDE 2 MG/ML
0.2 INJECTION, SOLUTION INTRAMUSCULAR; INTRAVENOUS; SUBCUTANEOUS EVERY 5 MIN PRN
Status: DISCONTINUED | OUTPATIENT
Start: 2017-11-16 | End: 2017-11-16 | Stop reason: HOSPADM

## 2017-11-16 RX ORDER — HYDROCODONE BITARTRATE AND ACETAMINOPHEN 5; 325 MG/1; MG/1
1 TABLET ORAL EVERY 4 HOURS PRN
Status: DISCONTINUED | OUTPATIENT
Start: 2017-11-16 | End: 2017-11-17 | Stop reason: HOSPADM

## 2017-11-16 RX ORDER — HYDROCODONE BITARTRATE AND ACETAMINOPHEN 10; 325 MG/1; MG/1
1 TABLET ORAL EVERY 4 HOURS PRN
Status: DISCONTINUED | OUTPATIENT
Start: 2017-11-16 | End: 2017-11-17 | Stop reason: HOSPADM

## 2017-11-16 RX ORDER — BUPIVACAINE HYDROCHLORIDE AND EPINEPHRINE 2.5; 5 MG/ML; UG/ML
INJECTION, SOLUTION EPIDURAL; INFILTRATION; INTRACAUDAL; PERINEURAL
Status: DISCONTINUED | OUTPATIENT
Start: 2017-11-16 | End: 2017-11-16 | Stop reason: HOSPADM

## 2017-11-16 RX ORDER — SODIUM CHLORIDE 0.9 % (FLUSH) 0.9 %
3 SYRINGE (ML) INJECTION
Status: DISCONTINUED | OUTPATIENT
Start: 2017-11-16 | End: 2017-11-16 | Stop reason: HOSPADM

## 2017-11-16 RX ORDER — SODIUM CHLORIDE 0.9 % (FLUSH) 0.9 %
3 SYRINGE (ML) INJECTION EVERY 8 HOURS
Status: DISCONTINUED | OUTPATIENT
Start: 2017-11-16 | End: 2017-11-16 | Stop reason: HOSPADM

## 2017-11-16 RX ORDER — DIPHENHYDRAMINE HYDROCHLORIDE 50 MG/ML
25 INJECTION INTRAMUSCULAR; INTRAVENOUS EVERY 6 HOURS PRN
Status: DISCONTINUED | OUTPATIENT
Start: 2017-11-16 | End: 2017-11-16 | Stop reason: HOSPADM

## 2017-11-16 RX ORDER — FENTANYL CITRATE 50 UG/ML
INJECTION, SOLUTION INTRAMUSCULAR; INTRAVENOUS
Status: DISCONTINUED | OUTPATIENT
Start: 2017-11-16 | End: 2017-11-16

## 2017-11-16 RX ORDER — PROPOFOL 10 MG/ML
VIAL (ML) INTRAVENOUS
Status: DISCONTINUED | OUTPATIENT
Start: 2017-11-16 | End: 2017-11-16

## 2017-11-16 RX ORDER — PROMETHAZINE HYDROCHLORIDE 12.5 MG/1
12.5 TABLET ORAL EVERY 6 HOURS PRN
Status: DISCONTINUED | OUTPATIENT
Start: 2017-11-16 | End: 2017-11-16 | Stop reason: HOSPADM

## 2017-11-16 RX ORDER — ONDANSETRON 2 MG/ML
INJECTION INTRAMUSCULAR; INTRAVENOUS
Status: DISCONTINUED | OUTPATIENT
Start: 2017-11-16 | End: 2017-11-16

## 2017-11-16 RX ORDER — LIDOCAINE HYDROCHLORIDE 10 MG/ML
1 INJECTION, SOLUTION EPIDURAL; INFILTRATION; INTRACAUDAL; PERINEURAL ONCE
Status: COMPLETED | OUTPATIENT
Start: 2017-11-16 | End: 2017-11-16

## 2017-11-16 RX ADMIN — BUPIVACAINE HYDROCHLORIDE AND EPINEPHRINE BITARTRATE 30 ML: 2.5; .0091 INJECTION, SOLUTION EPIDURAL; INFILTRATION; INTRACAUDAL; PERINEURAL at 12:11

## 2017-11-16 RX ADMIN — FENTANYL CITRATE 100 MCG: 50 INJECTION INTRAMUSCULAR; INTRAVENOUS at 01:11

## 2017-11-16 RX ADMIN — OXYCODONE HYDROCHLORIDE 5 MG: 5 TABLET ORAL at 02:11

## 2017-11-16 RX ADMIN — DEXAMETHASONE SODIUM PHOSPHATE 4 MG: 4 INJECTION, SOLUTION INTRAMUSCULAR; INTRAVENOUS at 11:11

## 2017-11-16 RX ADMIN — SODIUM CHLORIDE: 0.9 INJECTION, SOLUTION INTRAVENOUS at 10:11

## 2017-11-16 RX ADMIN — FENTANYL CITRATE 50 MCG: 50 INJECTION INTRAMUSCULAR; INTRAVENOUS at 11:11

## 2017-11-16 RX ADMIN — VANCOMYCIN HYDROCHLORIDE 1 G: 1 INJECTION, POWDER, LYOPHILIZED, FOR SOLUTION INTRAVENOUS at 12:11

## 2017-11-16 RX ADMIN — PROPOFOL 150 MG: 10 INJECTION, EMULSION INTRAVENOUS at 11:11

## 2017-11-16 RX ADMIN — FENTANYL CITRATE 50 MCG: 50 INJECTION INTRAMUSCULAR; INTRAVENOUS at 12:11

## 2017-11-16 RX ADMIN — SODIUM CHLORIDE, SODIUM GLUCONATE, SODIUM ACETATE, POTASSIUM CHLORIDE, MAGNESIUM CHLORIDE, SODIUM PHOSPHATE, DIBASIC, AND POTASSIUM PHOSPHATE: .53; .5; .37; .037; .03; .012; .00082 INJECTION, SOLUTION INTRAVENOUS at 12:11

## 2017-11-16 RX ADMIN — SODIUM CHLORIDE, SODIUM GLUCONATE, SODIUM ACETATE, POTASSIUM CHLORIDE, MAGNESIUM CHLORIDE, SODIUM PHOSPHATE, DIBASIC, AND POTASSIUM PHOSPHATE: .53; .5; .37; .037; .03; .012; .00082 INJECTION, SOLUTION INTRAVENOUS at 01:11

## 2017-11-16 RX ADMIN — SODIUM CHLORIDE, SODIUM GLUCONATE, SODIUM ACETATE, POTASSIUM CHLORIDE, MAGNESIUM CHLORIDE, SODIUM PHOSPHATE, DIBASIC, AND POTASSIUM PHOSPHATE: .53; .5; .37; .037; .03; .012; .00082 INJECTION, SOLUTION INTRAVENOUS at 10:11

## 2017-11-16 RX ADMIN — SODIUM CHLORIDE: 0.9 INJECTION, SOLUTION INTRAVENOUS at 04:11

## 2017-11-16 RX ADMIN — LIDOCAINE HYDROCHLORIDE 10 MG: 10 INJECTION, SOLUTION EPIDURAL; INFILTRATION; INTRACAUDAL; PERINEURAL at 10:11

## 2017-11-16 RX ADMIN — MUPIROCIN 1 G: 20 OINTMENT TOPICAL at 08:11

## 2017-11-16 RX ADMIN — ONDANSETRON 4 MG: 2 INJECTION, SOLUTION INTRAMUSCULAR; INTRAVENOUS at 12:11

## 2017-11-16 RX ADMIN — KETOROLAC TROMETHAMINE 30 MG: 30 INJECTION, SOLUTION INTRAMUSCULAR; INTRAVENOUS at 01:11

## 2017-11-16 RX ADMIN — LIDOCAINE HYDROCHLORIDE 75 MG: 20 INJECTION, SOLUTION INTRAVENOUS at 11:11

## 2017-11-16 NOTE — ANESTHESIA PREPROCEDURE EVALUATION
11/16/2017  Sarahy Coronel is a 72 y.o., female.    Anesthesia Evaluation    I have reviewed the Patient Summary Reports.    I have reviewed the Nursing Notes.   I have reviewed the Medications.     Review of Systems  Anesthesia Hx:  No problems with previous Anesthesia Hx of Anesthetic complications (PONV)    Social:  Non-Smoker    Hematology/Oncology:        Current/Recent Cancer. Breast right   Cardiovascular:  Cardiovascular Normal  Being followed for MR and slight murmur but Pt is asymptomatic.   Pulmonary:  Pulmonary Normal    Renal/:  Renal/ Normal     Hepatic/GI:   GERD, well controlled    Neurological:  Neurology Normal    Endocrine:   Hypothyroidism        Physical Exam  General:  Well nourished    Airway/Jaw/Neck:  Airway Findings: Mouth Opening: Normal Tongue: Normal  General Airway Assessment: Adult  Oropharynx Findings:  Mallampati: I  Jaw/Neck Findings:  Neck ROM: Normal ROM     Eyes/Ears/Nose:  Eyes/Ears/Nose Findings:    Dental:  Dental Findings:   Chest/Lungs:  Chest/Lungs Findings: Normal Respiratory Rate     Heart/Vascular:  Heart Findings: Rate: Normal  Rhythm: Regular Rhythm        Mental Status:  Mental Status Findings:  Cooperative, Alert and Oriented         Anesthesia Plan  Type of Anesthesia, risks & benefits discussed:  Anesthesia Type:  general  Patient's Preference:   Intra-op Monitoring Plan:   Intra-op Monitoring Plan Comments:   Post Op Pain Control Plan: multimodal analgesia  Post Op Pain Control Plan Comments:   Induction:   IV  Beta Blocker:  Patient is not currently on a Beta-Blocker (No further documentation required).       Informed Consent: Patient understands risks and agrees with Anesthesia plan.  Questions answered. Anesthesia consent signed with patient.  ASA Score: 2     Day of Surgery Review of History & Physical:  There are no significant changes.   H&P  completed by Anesthesiologist.   Anesthesia Plan Notes: Pt's niece has M.H.  Pt has had several anesthetics uneventfully and I advised pt of remote possibility of a reaction.         Ready For Surgery From Anesthesia Perspective.

## 2017-11-16 NOTE — PLAN OF CARE
Patient is stable at this time.  VSS. Anesthesiologist at patient's bedside and is ok for patient to transfer to the floor.  Dressings clean, dry and intact to right breast with tai drain in place with no problems noted.  Pain is a 3/10.  No complaints of nausea or vomiting.  Patient tolerating po intake well.

## 2017-11-16 NOTE — ANESTHESIA POSTPROCEDURE EVALUATION
"Anesthesia Post Evaluation    Patient: Sarahy Coronel    Procedure(s) Performed: Procedure(s) (LRB):  MASTECTOMY (Right)  BIOPSY-LYMPH NODE-SENTINEL (Right)  DISSECTION-LYMPH NODE-AXILLARY (Right)    Final Anesthesia Type: general  Patient location during evaluation: PACU  Patient participation: Yes- Able to Participate  Level of consciousness: awake and alert and oriented  Post-procedure vital signs: reviewed and stable  Pain management: adequate  Airway patency: patent  PONV status at discharge: No PONV  Anesthetic complications: no      Cardiovascular status: blood pressure returned to baseline and stable  Respiratory status: unassisted and spontaneous ventilation  Hydration status: euvolemic  Follow-up not needed.        Visit Vitals  BP (!) 168/80   Pulse 68   Temp 36.4 °C (97.5 °F) (Temporal)   Resp 11   Ht 5' 6" (1.676 m)   Wt 71.7 kg (158 lb)   SpO2 99%   Breastfeeding? No   BMI 25.50 kg/m²       Pain/Leonie Score: Pain Assessment Performed: Yes (11/16/2017  2:20 PM)  Presence of Pain: complains of pain/discomfort (11/16/2017  2:20 PM)  Pain Rating Prior to Med Admin: 3 (11/16/2017  2:09 PM)  Leonie Score: 9 (11/16/2017  2:20 PM)      "

## 2017-11-16 NOTE — BRIEF OP NOTE
Patient: Sarahy Coronel     Date of Procedure: 11/16/2017    Procedure: Right mastectomy  Right axillary sentinel node biopsy    Surgeon: Pasha Starr MD    Assistant: None    Pre-op Diagnosis: Malignant neoplasm of right female breast, unspecified estrogen receptor status, unspecified site of breast [C50.911]     Post-op Diagnosis: Malignant neoplasm of right female breast, unspecified estrogen receptor status, unspecified site of breast [C50.911]    Findings: breast cancer  Negative sentinel node on frozen section.    Specimen: Right axillary sentinel node.  Right mastectomy.    EBL: 150 cc    Complications: None

## 2017-11-16 NOTE — H&P (VIEW-ONLY)
Patient ID: Sarahy Coronel is a 72 y.o. female.     Chief Complaint: Consult (atypical pappillary)        HPI 71 yo female referred for excision of recently diagnosed right breast atypical papillary lesion. Radiology performed US guided biopsy. Pt denies any symptoms. Has history of fibrocystic changes. No previous biopsy. 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 Margins were close. Pt will need re excision and lymph node evaluation. No complications from excision.          Past Medical History:   Diagnosis Date    Actinic keratoses      Allergy      Chronic bronchitis      DVT (deep venous thrombosis)      leg, no precipitating factors, no longer on anticoagulants    Encounter for blood transfusion       ectopic pregnancy    Family history of complications due to general anesthesia       sister's daughter almost  from MALIGNANT HYPERTHERMIA ( pt denies adverse reaction)    GERD (gastroesophageal reflux disease)       no medication    Hypothyroidism      Leukopenia past Hx    Mitral regurgitation       mild,asymptomatic    Phlebitis       past Hx, legs    PONV (postoperative nausea and vomiting)       once only    TMJ (dislocation of temporomandibular joint)              Past Surgical History:   Procedure Laterality Date    APPENDECTOMY        CHOLECYSTECTOMY        ECTOPIC PREGNANCY SURGERY         removal of ovary and tube prior to hysterectomy    ESOPHAGOGASTRODUODENOSCOPY        KNEE SURGERY        meniscal repair    LIPOMA RESECTION         left shoulder    TOTAL ABDOMINAL HYSTERECTOMY W/ BILATERAL SALPINGOOPHORECTOMY   s     after ectopic pregnancy               Current Outpatient Prescriptions:     aspirin (ECOTRIN) 81 MG EC tablet, Take 81 mg by mouth once daily., Disp: , Rfl:     calcium-vitamin D (CALCIUM-VITAMIN D) 500 mg(1,250mg) -200 unit per tablet, Take 1 tablet by mouth once daily.  , Disp: , Rfl:     cholecalciferol, vitamin D3, (VITAMIN D3)  2,000 unit Cap, Take 1 capsule by mouth once daily., Disp: , Rfl:     fish oil-omega-3 fatty acids 300-1,000 mg capsule, Take 2 g by mouth once daily., Disp: , Rfl:     GLUCOSAMINE/MSM/CHONDROIT SULF (GLUCOSAMINE 2KCL-MSM-CHONDROIT ORAL), Take by mouth., Disp: , Rfl:     levothyroxine (SYNTHROID) 88 MCG tablet, Take 1 tablet (88 mcg total) by mouth once daily., Disp: 90 tablet, Rfl: 3    loratadine (CLARITIN) 10 mg tablet, Take 10 mg by mouth daily as needed. , Disp: , Rfl:         Review of patient's allergies indicates:   Allergen Reactions    Demerol (pf) [meperidine (pf)] Nausea And Vomiting    Erythromycin Other (See Comments)       Stomach cramps    Lorabid [loracarbef] Nausea And Vomiting    Penicillin g Hives    Fosamax [alendronate] Other (See Comments)       Bone pain               Family History   Problem Relation Age of Onset    Breast cancer Other 40    Heart disease Mother      Cancer Father        Social History   Social History            Social History    Marital status:        Spouse name: N/A    Number of children: N/A    Years of education: N/A          Occupational History    Not on file.           Social History Main Topics    Smoking status: Never Smoker    Smokeless tobacco: Never Used    Alcohol use No    Drug use: No    Sexual activity: Not on file           Other Topics Concern    Not on file          Social History Narrative    No narrative on file         Review of Systems   Constitutional: Negative for activity change, chills, fever and unexpected weight change.   HENT: Negative for congestion, sore throat, trouble swallowing and voice change.    Eyes: Negative for redness and visual disturbance.   Respiratory: Negative for cough, shortness of breath and wheezing.    Cardiovascular: Negative for chest pain and palpitations.   Gastrointestinal: Negative for abdominal pain, blood in stool, nausea and vomiting.   Endocrine: Negative.    Genitourinary: Negative  for dysuria, frequency and hematuria.   Musculoskeletal: Negative for arthralgias, back pain and neck pain.   Skin: Negative for rash and wound.   Allergic/Immunologic: Negative.    Neurological: Negative for dizziness, weakness and headaches.   Hematological: Negative for adenopathy.   Psychiatric/Behavioral: Negative for agitation and dysphoric mood. The patient is not nervous/anxious.       Objective:      Physical Exam   Constitutional: She is oriented to person, place, and time. She appears well-developed and well-nourished. No distress.   HENT:   Head: Normocephalic and atraumatic.   Eyes: Conjunctivae and EOM are normal. Pupils are equal, round, and reactive to light. No scleral icterus.   Neck: Normal range of motion.   Cardiovascular: Normal rate and regular rhythm.    No murmur heard.  Pulmonary/Chest: Effort normal and breath sounds normal. She has no wheezes. She has no rales.   Right Breast Exam:  Incision site is healing well. No evidence of infection. Minimal tenderness.    There is no abnormality detected on physical exam of the contralateral breast.   Abdominal: Soft. Bowel sounds are normal. She exhibits no distension and no mass. There is no tenderness. No hernia.   Musculoskeletal: Normal range of motion. She exhibits no edema.   Neurological: She is alert and oriented to person, place, and time. No cranial nerve deficit.   Skin: Skin is warm and dry. No rash noted. No erythema.   Psychiatric: She has a normal mood and affect. Her behavior is normal.      FINAL PATHOLOGIC DIAGNOSIS  Procedure: Lumpectomy  Specimen Laterality: Right  Tumor Size: 0.9 cm in greatest dimension  Histologic Type: Invasive papillary carcinoma  Histologic Grade ([place] histologic score):  -- Glandular/tubular differentiation: Score 2.  -- Nuclear pleomorphism: Score 1  -- Mitotic Count: Score 1.  -- Overall Grade: Grade 1.    Assessment:           Encounter Diagnoses   Name Primary?    Preop testing Yes    Breast  mass in female      Invasive papillary carcinoma     Plan:      1. Discussed options at length with the patient. She would like to proceed with mastectomy. She is not interested in breast conservation.  2. Plan right mastectomy with sentinel node biopsy and possible axillary dissection.  3. Risks and benefits of the planned procedure were discussed at length with the patient.  Risks and benefits of not proceeding with the procedure were discussed as well. All questions were answered. The patient expressed clear understanding and would like to proceed with the procedure as discussed.

## 2017-11-16 NOTE — PROGRESS NOTES
Patient ID: Sarahy Coronel is a 72 y.o. female.     Chief Complaint: Consult (atypical pappillary)        HPI 73 yo female referred for excision of recently diagnosed right breast atypical papillary lesion. Radiology performed US guided biopsy. Pt denies any symptoms. Has history of fibrocystic changes. No previous biopsy. 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 Margins were close. Pt will need re excision and lymph node evaluation. No complications from excision.          Past Medical History:   Diagnosis Date    Actinic keratoses      Allergy      Chronic bronchitis      DVT (deep venous thrombosis)      leg, no precipitating factors, no longer on anticoagulants    Encounter for blood transfusion       ectopic pregnancy    Family history of complications due to general anesthesia       sister's daughter almost  from MALIGNANT HYPERTHERMIA ( pt denies adverse reaction)    GERD (gastroesophageal reflux disease)       no medication    Hypothyroidism      Leukopenia past Hx    Mitral regurgitation       mild,asymptomatic    Phlebitis       past Hx, legs    PONV (postoperative nausea and vomiting)       once only    TMJ (dislocation of temporomandibular joint)              Past Surgical History:   Procedure Laterality Date    APPENDECTOMY        CHOLECYSTECTOMY        ECTOPIC PREGNANCY SURGERY         removal of ovary and tube prior to hysterectomy    ESOPHAGOGASTRODUODENOSCOPY        KNEE SURGERY        meniscal repair    LIPOMA RESECTION         left shoulder    TOTAL ABDOMINAL HYSTERECTOMY W/ BILATERAL SALPINGOOPHORECTOMY   s     after ectopic pregnancy               Current Outpatient Prescriptions:     aspirin (ECOTRIN) 81 MG EC tablet, Take 81 mg by mouth once daily., Disp: , Rfl:     calcium-vitamin D (CALCIUM-VITAMIN D) 500 mg(1,250mg) -200 unit per tablet, Take 1 tablet by mouth once daily.  , Disp: , Rfl:     cholecalciferol, vitamin D3, (VITAMIN D3)  2,000 unit Cap, Take 1 capsule by mouth once daily., Disp: , Rfl:     fish oil-omega-3 fatty acids 300-1,000 mg capsule, Take 2 g by mouth once daily., Disp: , Rfl:     GLUCOSAMINE/MSM/CHONDROIT SULF (GLUCOSAMINE 2KCL-MSM-CHONDROIT ORAL), Take by mouth., Disp: , Rfl:     levothyroxine (SYNTHROID) 88 MCG tablet, Take 1 tablet (88 mcg total) by mouth once daily., Disp: 90 tablet, Rfl: 3    loratadine (CLARITIN) 10 mg tablet, Take 10 mg by mouth daily as needed. , Disp: , Rfl:         Review of patient's allergies indicates:   Allergen Reactions    Demerol (pf) [meperidine (pf)] Nausea And Vomiting    Erythromycin Other (See Comments)       Stomach cramps    Lorabid [loracarbef] Nausea And Vomiting    Penicillin g Hives    Fosamax [alendronate] Other (See Comments)       Bone pain               Family History   Problem Relation Age of Onset    Breast cancer Other 40    Heart disease Mother      Cancer Father        Social History   Social History            Social History    Marital status:        Spouse name: N/A    Number of children: N/A    Years of education: N/A          Occupational History    Not on file.           Social History Main Topics    Smoking status: Never Smoker    Smokeless tobacco: Never Used    Alcohol use No    Drug use: No    Sexual activity: Not on file           Other Topics Concern    Not on file          Social History Narrative    No narrative on file         Review of Systems   Constitutional: Negative for activity change, chills, fever and unexpected weight change.   HENT: Negative for congestion, sore throat, trouble swallowing and voice change.    Eyes: Negative for redness and visual disturbance.   Respiratory: Negative for cough, shortness of breath and wheezing.    Cardiovascular: Negative for chest pain and palpitations.   Gastrointestinal: Negative for abdominal pain, blood in stool, nausea and vomiting.   Endocrine: Negative.    Genitourinary: Negative  for dysuria, frequency and hematuria.   Musculoskeletal: Negative for arthralgias, back pain and neck pain.   Skin: Negative for rash and wound.   Allergic/Immunologic: Negative.    Neurological: Negative for dizziness, weakness and headaches.   Hematological: Negative for adenopathy.   Psychiatric/Behavioral: Negative for agitation and dysphoric mood. The patient is not nervous/anxious.       Objective:      Physical Exam   Constitutional: She is oriented to person, place, and time. She appears well-developed and well-nourished. No distress.   HENT:   Head: Normocephalic and atraumatic.   Eyes: Conjunctivae and EOM are normal. Pupils are equal, round, and reactive to light. No scleral icterus.   Neck: Normal range of motion.   Cardiovascular: Normal rate and regular rhythm.    No murmur heard.  Pulmonary/Chest: Effort normal and breath sounds normal. She has no wheezes. She has no rales.   Right Breast Exam:  Incision site is healing well. No evidence of infection. Minimal tenderness.    There is no abnormality detected on physical exam of the contralateral breast.   Abdominal: Soft. Bowel sounds are normal. She exhibits no distension and no mass. There is no tenderness. No hernia.   Musculoskeletal: Normal range of motion. She exhibits no edema.   Neurological: She is alert and oriented to person, place, and time. No cranial nerve deficit.   Skin: Skin is warm and dry. No rash noted. No erythema.   Psychiatric: She has a normal mood and affect. Her behavior is normal.      FINAL PATHOLOGIC DIAGNOSIS  Procedure: Lumpectomy  Specimen Laterality: Right  Tumor Size: 0.9 cm in greatest dimension  Histologic Type: Invasive papillary carcinoma  Histologic Grade ([place] histologic score):  -- Glandular/tubular differentiation: Score 2.  -- Nuclear pleomorphism: Score 1  -- Mitotic Count: Score 1.  -- Overall Grade: Grade 1.    Assessment:           Encounter Diagnoses   Name Primary?    Preop testing Yes    Breast  mass in female      Invasive papillary carcinoma     Plan:      1. Discussed options at length with the patient. She would like to proceed with mastectomy. She is not interested in breast conservation.  2. Plan right mastectomy with sentinel node biopsy and possible axillary dissection.  3. Risks and benefits of the planned procedure were discussed at length with the patient.  Risks and benefits of not proceeding with the procedure were discussed as well. All questions were answered. The patient expressed clear understanding and would like to proceed with the procedure as discussed.

## 2017-11-16 NOTE — DISCHARGE INSTRUCTIONS
General Information:    1.  Do not drink alcoholic beverages including beer for 24 hours or as long as you are on pain medication..  2.  Do not drive a motor vehicle, operate machinery or power tools, or signs legal papers for 24 hours or as long as you are on pain medication.   3.  You may experience light-headedness, dizziness, and sleepiness following surgery. Please do not stay alone. A responsible adult should be with you for this 24 hour period.  4.  Go home and rest.    5. Progress slowly to a normal diet unless instructed.  Otherwise, begin with liquids such as soft drinks, then soup and crackers working up to solid foods. Drink plenty of nonalcoholic fluids.  6.  Certain anesthetics and pain medications produce nausea and vomiting in certain       individuals. If nausea becomes a problem at home, call you doctor.    7. A nurse will be calling you sometime after surgery. Do not be alarmed. This is our way of finding out how you are doing.    8. Several times every hour while you are awake, take 2-3 deep breaths and cough. If you had stomach surgery hold a pillow or rolled towel firmly against your stomach before you cough. This will help with any pain the cough might cause.  9. Several times every hour while you are awake, pump and flex your feet 5-6 times and do foot circles. This will help prevent blood clots.    10.Call your doctor for severe pain, bleeding, fever, or signs or symptoms of infection (pain, swelling, redness, foul odor, drainage).    Discharge Instructions: After Your Surgery/Procedure  Youve just had surgery. During surgery you were given medicine called anesthesia to keep you relaxed and free of pain. After surgery you may have some pain or nausea. This is common. Here are some tips for feeling better and getting well after surgery.     Stay on schedule with your medication.   Going home  Your doctor or nurse will show you how to take care of yourself when you go home. He or she will  "also answer your questions. Have an adult family member or friend drive you home.      For your safety we recommend these precaution for the first 24 hours after your procedure:  · Do not drive or use heavy equipment.  · Do not make important decisions or sign legal papers.  · Do not drink alcohol.  · Have someone stay with you, if needed. He or she can watch for problems and help keep you safe.  · Your concentration, balance, coordination, and judgement may be impaired for many hours after anesthesia.  Use caution when ambulating or standing up.     · You may feel weak and "washed out" after anesthesia and surgery.      Subtle residual effects of general anesthesia or sedation with regional / local anesthesia can last more than 24 hours.  Rest for the remainder of the day or longer if your Doctor/Surgeon has advised you to do so.  Although you may feel normal within the first 24 hours, your reflexes and mental ability may be impaired without you realizing it.  You may feel dizzy, lightheaded or sleepy for 24 hours or longer.      Be sure to go to all follow-up visits with your doctor. And rest after your surgery for as long as your doctor tells you to.  Coping with pain  If you have pain after surgery, pain medicine will help you feel better. Take it as told, before pain becomes severe. Also, ask your doctor or pharmacist about other ways to control pain. This might be with heat, ice, or relaxation. And follow any other instructions your surgeon or nurse gives you.  Tips for taking pain medicine  To get the best relief possible, remember these points:  · Pain medicines can upset your stomach. Taking them with a little food may help.  · Most pain relievers taken by mouth need at least 20 to 30 minutes to start to work.  · Taking medicine on a schedule can help you remember to take it. Try to time your medicine so that you can take it before starting an activity. This might be before you get dressed, go for a walk, " or sit down for dinner.  · Constipation is a common side effect of pain medicines. Call your doctor before taking any medicines such as laxatives or stool softeners to help ease constipation. Also ask if you should skip any foods. Drinking lots of fluids and eating foods such as fruits and vegetables that are high in fiber can also help. Remember, do not take laxatives unless your surgeon has prescribed them.  · Drinking alcohol and taking pain medicine can cause dizziness and slow your breathing. It can even be deadly. Do not drink alcohol while taking pain medicine.  · Pain medicine can make you react more slowly to things. Do not drive or run machinery while taking pain medicine.  Your health care provider may tell you to take acetaminophen to help ease your pain. Ask him or her how much you are supposed to take each day. Acetaminophen or other pain relievers may interact with your prescription medicines or other over-the-counter (OTC) drugs. Some prescription medicines have acetaminophen and other ingredients. Using both prescription and OTC acetaminophen for pain can cause you to overdose. Read the labels on your OTC medicines with care. This will help you to clearly know the list of ingredients, how much to take, and any warnings. It may also help you not take too much acetaminophen. If you have questions or do not understand the information, ask your pharmacist or health care provider to explain it to you before you take the OTC medicine.  Managing nausea  Some people have an upset stomach after surgery. This is often because of anesthesia, pain, or pain medicine, or the stress of surgery. These tips will help you handle nausea and eat healthy foods as you get better. If you were on a special food plan before surgery, ask your doctor if you should follow it while you get better. These tips may help:  · Do not push yourself to eat. Your body will tell you when to eat and how much.  · Start off with clear  liquids and soup. They are easier to digest.  · Next try semi-solid foods, such as mashed potatoes, applesauce, and gelatin, as you feel ready.  · Slowly move to solid foods. Dont eat fatty, rich, or spicy foods at first.  · Do not force yourself to have 3 large meals a day. Instead eat smaller amounts more often.  · Take pain medicines with a small amount of solid food, such as crackers or toast, to avoid nausea.     Call your surgeon if  · You still have pain an hour after taking medicine. The medicine may not be strong enough.  · You feel too sleepy, dizzy, or groggy. The medicine may be too strong.  · You have side effects like nausea, vomiting, or skin changes, such as rash, itching, or hives.       If you have obstructive sleep apnea  You were given anesthesia medicine during surgery to keep you comfortable and free of pain. After surgery, you may have more apnea spells because of this medicine and other medicines you were given. The spells may last longer than usual.   At home:  · Keep using the continuous positive airway pressure (CPAP) device when you sleep. Unless your health care provider tells you not to, use it when you sleep, day or night. CPAP is a common device used to treat obstructive sleep apnea.  · Talk with your provider before taking any pain medicine, muscle relaxants, or sedatives. Your provider will tell you about the possible dangers of taking these medicines.  © 8598-5623 [employer]. [address] [place] [id number]. All rights reserved. This information is not intended as a substitute for professional medical care. Always follow your healthcare professional's instructions.  Post op instructions for prevention of DVT  What is deep vein thrombosis?  Deep vein thrombosis (DVT) is the medical term for blood clots in the deep veins of the leg.  These blood clots can be dangerous.  A DVT can block a blood vessel and keep blood from getting where it needs to go.   Another problem is that the clot can travel to other parts of the body such as the lungs.  A clot that travels to the lungs is called a pulmonary embolus (PE) and can cause serious problems with breathing which can lead to death.  Am I at risk for DVT/PE?  If you are not very active, you are at risk of DVT.  Anyone confined to bed, sitting for long periods of time, recovering from surgery, etc. increases the risk of DVT.  Other risk factors are cancer diagnosis, certain medications, estrogen replacement in any form,older age, obesity, pregnancy, smoking, history of clotting disorders, and dehydration.  How will I know if I have a DVT?   Swelling in the lower leg   Pain   Warmth, redness, hardness or bulging of the vein  If you have any of these symptoms, call your doctors office right away.  Some people will not have any symptoms until the clot moves to the lungs.  What are the symptoms of a PE?   Panting, shortness of breath, or trouble breathing   Sharp, knife-like chest pain when you breathe   Coughing or coughing up blood   Rapid heartbeat  If you have any of these symptoms or get worse quickly, call 911 for emergency treatment.  How can I prevent a DVT?   Avoid long periods of inactivity and dont cross your legs--get up and walk around every hour or so.   Stay active--walking after surgery is highly encouraged.  This means you should get out of the house and walk in the neighborhood.  Going up and down stairs will not impair healing (unless advised against such activity by your doctor).     Drink plenty of noncaffeinated, nonalcoholic fluids each day to prevent dehydration.   Wear special support stockings, if they have been advised by your doctor.   If you travel, stop at least once an hour and walk around.   Avoid smoking (assistance with stopping is available through your healthcare provider)  Always notify your doctor if you are not able to follow the post operative instructions that are  given to you at the time of discharge.  It may be necessary to prescribe one of the medications available to prevent DVT.Using Opioids for Pain Management     Your doctor has given instructions for you to take an opioid.  This is a drug for bad pain.  It helps control pain without causing bleeding and kidney problems.  Common opioid names are morphine, hydromorphone, oxycodone, and methadone. These drugs are called narcotics.    There are several safety concerns you need to know.     · It is against the law to give or sell this drug to another person.  You must keep this medicine safely locked.    · You may have side effects from taking this medication.  These include nausea, itching, sweating, sleepiness, a change in your ability to breathe, and depression.  · Do not take alcohol or sleeping pills opioids.    · Long-term opoid use may no longer giver you relief from pain.  It can cause you stomach pain, mental anxiety, and headaches.  Long-term opoid use can potentially lead to unlawful street drug abuse and reduce your ability to stay employed.    · Your body may become opioid tolerant if you need to take more to get relief.    · You must stop taking opioids if you begin having more pain as a result of the medicine.    · Opioid withdrawal occurs when you have to stop taking the drug.  It can cause you to have nausea, vomiting, diarrhea, stomach pain, anxiety, and dilated pupils in your eyes. This condition means you are opioid dependent.    · Addiction is a drug induced brain disease. It means there are changes in how your brain is working.  Children, teens, and young adults under 25 years old are more likely to get addicted to opioids.      · Addiction can happen with repeated opioid use.  It does not happen with short-term use of two weeks or less.       For more information, please speak with your doctor or pharmacist.          Discharge Instructions for Mastectomy or Breast Lumpectomy  You are being treated  for breast cancer or precancer. The cancer or precancerous tissue was removed with surgery. This may have been done with a lumpectomy. Or it may have been with a total mastectomy. A lumpectomy means that the tumor and a bit of tissue around it were removed. Lymph nodes in your armpit may also have been removed. A mastectomy means that all of the breast tissue and maybe nearby lymph nodes have been removed.  Activity  · Be sure you understand what you can and cannot do as you recover from surgery:  · Ask for help with chores and errands while you recover.  · Do not lift anything heavy until your healthcare provider says it's OK.  · Do not vacuum or do active or strenuous housework until your healthcare provider says it's OK.  · Do the range-of-motion exercises that you learned in the hospital.   Home care  Here are suggestions for taking care of yourself at home:  · Take pain medicine as directed.  · Keep your incisions clean and dry.  · Check your incisions daily for signs of infection. These include redness, swelling, and drainage. They also include the edges of an incision opening up.  · Follow your healthcare provider's instructions about bathing or showering.  · If your healthcare provider says it's OK,wash your incisions gently. Use mild soap and warm water. Pat dry.  · Don't soak in a tub, hot tub, or pool until your healthcare provider says it's OK.   · Take your temperature each day for 7 days after the surgery.  · Eat normal meals as soon as you feel able. Stick to a healthy, well-balanced diet.  Follow-up  Make a follow-up appointment as directed by your healthcare provider. If you had a mastectomy, you may have choices for reconstructive breast surgery or a prosthesis.Ask to talk to someone who can tell you more about your choices.  When to call your healthcare provider  Call your healthcare provider right away if you have any of the following:  · Fever of 100.4°F (38°C) or higher  · Chills  · Drainage  from your incisions  · Swelling around your incisions  · Increasing pain in or around your incisions  · Swelling in your arm or hand on the surgery side  Know what problems to watch for and when you need to call your healthcare providers. Also be sure you know how to get help after office hours and on weekends and holidays, too.    Date Last Reviewed: 10/31/2015  © 3089-8496 [employer]. [address] [place] [address], [place] [address]. All rights reserved. This information is not intended as a substitute for professional medical care. Always follow your healthcare professional's instructions.      We hope your stay was comfortable as you heal now, mend and rest.    For we have enjoyed taking care of you by giving your our best.    And as you get better, by regaining your health and strength;   We count it as a privilege to have served you and hope your time at [facility] was well spent.      Thank  You!!!

## 2017-11-16 NOTE — TRANSFER OF CARE
"Anesthesia Transfer of Care Note    Patient: Sarahy Coronel    Procedure(s) Performed: Procedure(s) (LRB):  MASTECTOMY (Right)  BIOPSY-LYMPH NODE-SENTINEL (Right)  DISSECTION-LYMPH NODE-AXILLARY (Right)    Patient location: PACU    Anesthesia Type: general    Transport from OR: Transported from OR on 2-3 L/min O2 by NC with adequate spontaneous ventilation    Post pain: adequate analgesia    Post assessment: no apparent anesthetic complications and tolerated procedure well    Post vital signs: stable    Level of consciousness: awake, alert and oriented    Nausea/Vomiting: no nausea/vomiting    Complications: none    Transfer of care protocol was followed      Last vitals:   Visit Vitals  BP (!) 158/71 (BP Location: Left arm, Patient Position: Lying)   Pulse 71   Temp 36.4 °C (97.5 °F) (Temporal)   Resp 18   Ht 5' 6" (1.676 m)   Wt 71.7 kg (158 lb)   SpO2 100%   Breastfeeding? No   BMI 25.50 kg/m²     "

## 2017-11-16 NOTE — PLAN OF CARE
Pt denies any pain at this time.  Right radial = +2.  Pt's niece had Malignant Hyperthermia - will notify Anesthesia and MD.

## 2017-11-17 ENCOUNTER — TELEPHONE (OUTPATIENT)
Dept: SURGERY | Facility: CLINIC | Age: 72
End: 2017-11-17

## 2017-11-17 VITALS
WEIGHT: 158 LBS | RESPIRATION RATE: 16 BRPM | HEIGHT: 66 IN | SYSTOLIC BLOOD PRESSURE: 155 MMHG | HEART RATE: 67 BPM | DIASTOLIC BLOOD PRESSURE: 70 MMHG | BODY MASS INDEX: 25.39 KG/M2 | OXYGEN SATURATION: 100 % | TEMPERATURE: 98 F

## 2017-11-17 PROCEDURE — C9113 INJ PANTOPRAZOLE SODIUM, VIA: HCPCS | Performed by: SURGERY

## 2017-11-17 PROCEDURE — 25000003 PHARM REV CODE 250: Performed by: SURGERY

## 2017-11-17 PROCEDURE — 94761 N-INVAS EAR/PLS OXIMETRY MLT: CPT

## 2017-11-17 PROCEDURE — 94760 N-INVAS EAR/PLS OXIMETRY 1: CPT

## 2017-11-17 PROCEDURE — 63600175 PHARM REV CODE 636 W HCPCS: Performed by: SURGERY

## 2017-11-17 RX ADMIN — SODIUM CHLORIDE: 0.9 INJECTION, SOLUTION INTRAVENOUS at 05:11

## 2017-11-17 RX ADMIN — MUPIROCIN 1 G: 20 OINTMENT TOPICAL at 09:11

## 2017-11-17 RX ADMIN — PANTOPRAZOLE SODIUM 40 MG: 40 INJECTION, POWDER, FOR SOLUTION INTRAVENOUS at 05:11

## 2017-11-17 NOTE — OP NOTE
DATE OF PROCEDURE:  11/16/2017    PROCEDURES:  1.  Right mastectomy.  2.  Right sentinel node biopsy.    PREOPERATIVE DIAGNOSIS:  Invasive cancer of the right breast.    POSTOPERATIVE DIAGNOSIS:  Invasive cancer of the right breast with negative   sentinel node on frozen section.    SURGEON:  Pasha Joseph Jr., M.D.    SPECIMEN:  1.  Right breast.  2.  Right axillary sentinel node.    ASSISTANT:  None.    PROCEDURE IN DETAIL:  After appropriate consent was obtained, consent forms   signed and questions answered, the patient was taken to the Operating Room and   placed on the operating room table where general endotracheal anesthesia was   induced without difficulty.  Preop antibiotics were administered.  SCDs were in   place.  Time-out procedure was performed.  The chest was prepped and draped in   the usual sterile fashion.  A Neoprobe was used to identify the site of signal   in the right axilla.  Elliptical skin incisions were drawn on the breast.  The   superior skin incision was made with a scalpel and the flap raised superiorly to   the level almost to the clavicle and then medially to the midline, then   laterally up towards the axilla.  The Neoprobe was used to identify the sentinel   node.  There was a strong signal in 1-2 nodes and are clumped, these were sent   to Pathology.  Frozen section confirmed no evidence of metastatic disease in   that node.  There was no residual signal in the axilla after removal of that   specimen.  The inferior skin incision was made and the flap was raised   inferiorly down to the inframammary ridge and then medially to the previous   dissection, then laterally to the previous dissection and the breast was removed   from the chest wall from medial to lateral, including the pectoralis fascia.    The lateral dissection extended to the latissimus.  Specimen was then oriented   for the pathologist with a stitch on the axillary tail.  The wound was   thoroughly irrigated and  suctioned dry.  Adequate hemostasis was ensured.  Small   vessels were controlled with clips.  No axillary dissection was performed.  The   subcutaneous tissues and dermis was reapproximated with interrupted 3-0 Vicryl   suture after placement of a ADAN drain.  The drain was secured to the skin with   2-0 nylon suture.  Skin was then closed with 4-0 Monocryl subcuticular stitch.    Steri-Strips and dressings were applied.  The patient was awakened, extubated   and transferred to the Recovery Room in stable condition.  She tolerated the   procedure without complication.  Blood loss was approximately 150 mL.  Counts   were correct at the end of the procedure.      RTL/HN  dd: 11/16/2017 14:27:41 (CST)  td: 11/16/2017 19:32:47 (CST)  Doc ID   #[id number]  Job ID #[id number]    CC:

## 2017-11-17 NOTE — PLAN OF CARE
11/17/17 0741   Patient Assessment/Suction   Level of Consciousness (AVPU) alert   All Lung Fields Breath Sounds clear   PRE-TX-O2-ETCO2   O2 Device (Oxygen Therapy) room air   SpO2 98 %   Pulse Oximetry Type Intermittent   $ Pulse Oximetry - Multiple Charge Pulse Oximetry - Multiple   Pulse 60   Resp 18

## 2017-11-17 NOTE — TELEPHONE ENCOUNTER
----- Message from Namita Henry sent at 11/17/2017  4:20 PM CST -----  Contact: patient  Patient calling to schedule a post op - mastectomy. She wants to come on 11/27/17. No avail appt until 12/4/17. She also is requesting an itemized statement of everything. Please advise.  Call back   Thanks!

## 2017-11-17 NOTE — DISCHARGE SUMMARY
Discharge Summary  General Surgery      Admit Date: 11/16/2017    Discharge Date :11/16/2017    Attending Physician: Pasha Joseph     Discharge Physician: Pasha Joseph    Discharged Condition: good    Discharge Diagnosis: Malignant neoplasm of right female breast, unspecified estrogen receptor status, unspecified site of breast [C50.911]    Treatments/Procedures: Procedure(s) (LRB):  MASTECTOMY (Right)  BIOPSY-LYMPH NODE-SENTINEL (Right)  DISSECTION-LYMPH NODE-AXILLARY (Right)    Hospital Course: Uneventful; Discharged home from Recovery    Significant Diagnostic Studies: none    Disposition: Home or Self Care    Diet: Regular    Follow up: Office 10-14 days    Activity: No heavy lifting till seen in office.    Patient Instructions:   Current Discharge Medication List      CONTINUE these medications which have NOT CHANGED    Details   Lactobacillus rhamnosus GG (CULTURELLE) 10 billion cell capsule Take 1 capsule by mouth once daily.      loratadine (CLARITIN) 10 mg tablet Take 10 mg by mouth daily as needed.       aspirin (ECOTRIN) 81 MG EC tablet Take 81 mg by mouth once daily.      calcium-vitamin D (CALCIUM-VITAMIN D) 500 mg(1,250mg) -200 unit per tablet Take 1 tablet by mouth once daily.        cholecalciferol, vitamin D3, (VITAMIN D3) 2,000 unit Cap Take 1 capsule by mouth once daily.      fish oil-omega-3 fatty acids 300-1,000 mg capsule Take 2 g by mouth once daily.      levothyroxine (SYNTHROID) 88 MCG tablet Take 1 tablet (88 mcg total) by mouth once daily.  Qty: 90 tablet, Refills: 3               Discharge Procedure Orders  Diet general     Remove dressing in 48 hours

## 2017-11-17 NOTE — PLAN OF CARE
Problem: Patient Care Overview  Goal: Plan of Care Review  Outcome: Outcome(s) achieved Date Met: 11/17/17  No significant events reported overnight. Patient and aunt pleasant, appear comfortable and deny significant pain overnight. Patient reports 3/10 soreness to right breast/axilla/surgical site as tolerable, not requiring additional pain medication. Cold pack applied to site continuously this shift. Patient ambulated at least 480 ft in [place] after breakfast this AM, denying increase in pain with ambulation.     At time of discharge, patient reports having pain medicine at home. No new Rx issued at discharge.  Patient and x2 family members educated on AVS, f/u, home med continuation, ADAN drain care/milking/emptying,suction maintenance, & activity restriction to RUE (no lifting above shoulders, as instructed by Dr Joseph, and patient advised to f/u with Dr Joseph at followup apptmt regarding possible exercises for RUE).    Transporter discharged patient from facility via wheelchair.

## 2017-11-17 NOTE — PLAN OF CARE
Problem: Patient Care Overview  Goal: Plan of Care Review  Outcome: Ongoing (interventions implemented as appropriate)  Pt with teaching regarding ADAN drain emptying and milking of the tubing for the ADAN drain. Pt did return demonstration regarding emptying ADAN drain and performed this task well. Family member at bedside with teaching to family as well regarding ADNA drain. Pt denies pain post op. Ambulated pt in hallway 400 feet with no problems. Ice pack to right breast surgical site as needed. Pt tolerating food and liquids with no problems. Pt voiding with no problems. Call light in easy reach.

## 2017-11-17 NOTE — PLAN OF CARE
11/17/17 1127   Final Note   Assessment Type Final Discharge Note   Discharge Disposition Home   What phone number can be called within the next 1-3 days to see how you are doing after discharge? [phone]   Hospital Follow Up  Appt(s) scheduled? Yes

## 2017-11-17 NOTE — PLAN OF CARE
Problem: Patient Care Overview  Goal: Plan of Care Review  Outcome: Ongoing (interventions implemented as appropriate)  Patient AAOx4.  VSS.  Has remained afebrile this shift.  ADAN drain intact and sutured in place.  Patient able to milk, drain, and close to bulb suction with little assistance.  Ice pack applied to right chest.  Patient denied pain throughout shift.  Patient ambulated halls 2x and in room to bathroom 6x.  POC reviewed with patient.  Open discussion was facilitated.  Patient verbalized understanding.  Bed in lowest position, side rails up x2, call light within reach, and safety measures maintained throughout shift.  Patient has remained free of falls, trauma, and injury this shift.  Patient denies any needs at this time.  Will continue to monitor.

## 2017-11-27 ENCOUNTER — OFFICE VISIT (OUTPATIENT)
Dept: SURGERY | Facility: CLINIC | Age: 72
End: 2017-11-27
Payer: MEDICARE

## 2017-11-27 VITALS
WEIGHT: 158.06 LBS | BODY MASS INDEX: 25.51 KG/M2 | DIASTOLIC BLOOD PRESSURE: 76 MMHG | HEART RATE: 84 BPM | SYSTOLIC BLOOD PRESSURE: 168 MMHG

## 2017-11-27 DIAGNOSIS — C50.911 MALIGNANT NEOPLASM OF RIGHT FEMALE BREAST, UNSPECIFIED ESTROGEN RECEPTOR STATUS, UNSPECIFIED SITE OF BREAST: Primary | ICD-10-CM

## 2017-11-27 PROCEDURE — 99213 OFFICE O/P EST LOW 20 MIN: CPT | Mod: PBBFAC,PO | Performed by: SURGERY

## 2017-11-27 PROCEDURE — 99024 POSTOP FOLLOW-UP VISIT: CPT | Mod: ,,, | Performed by: SURGERY

## 2017-11-27 PROCEDURE — 99999 PR PBB SHADOW E&M-EST. PATIENT-LVL III: CPT | Mod: PBBFAC,,, | Performed by: SURGERY

## 2017-11-30 ENCOUNTER — TELEPHONE (OUTPATIENT)
Dept: HEMATOLOGY/ONCOLOGY | Facility: CLINIC | Age: 72
End: 2017-11-30

## 2017-11-30 NOTE — TELEPHONE ENCOUNTER
----- Message from Linnette Onofre LPN sent at 11/29/2017  2:47 PM CST -----  Contact: Patient      ----- Message -----  From: Kaitlynn Emanuel  Sent: 11/29/2017   1:50 PM  To: Davion WARREN Staff    Sarahy, patient [phone] Dr. Joseph wants patient to schedule an appointment to see Dr. Ricardo. Patient had a Mastectomy. Please advise. Thanks.

## 2017-12-05 NOTE — PROGRESS NOTES
POST-OP NOTE    PROCEDURE: Right mastectomy.    COMPLAINTS: None.    EXAM: Incision well approximated. No drainage. No infection. Drain removed.      IMPRESSION: FINAL PATHOLOGIC DIAGNOSIS  1.Lymph node, right axillary, excision:  One lymph node negative for metastatic carcinoma (0/1).  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 See comment.  Margins of resection are negative.  Background fibrocystic and fibroadenomatoid changes and usual ductal hyperplasia.  Microcalcifications present in lesional and nonlesional tissue.  Previous lumpectomy site changes.  Single benign lymph node.  Comment: Residual invasive papillary carcinoma is present, measuring 3.80 mm in maximal linear dimension.  Residual in situ carcinoma is present, measuring 1.72 mm.    PLAN: FU as needed.  Refer to Oncology.

## 2017-12-11 ENCOUNTER — INITIAL CONSULT (OUTPATIENT)
Dept: HEMATOLOGY/ONCOLOGY | Facility: CLINIC | Age: 72
End: 2017-12-11
Payer: MEDICARE

## 2017-12-11 ENCOUNTER — HOSPITAL ENCOUNTER (OUTPATIENT)
Dept: RADIOLOGY | Facility: HOSPITAL | Age: 72
Discharge: HOME | End: 2017-12-11
Attending: INTERNAL MEDICINE
Payer: MEDICARE

## 2017-12-11 VITALS
WEIGHT: 158.94 LBS | BODY MASS INDEX: 25.54 KG/M2 | HEART RATE: 77 BPM | DIASTOLIC BLOOD PRESSURE: 93 MMHG | HEIGHT: 66 IN | TEMPERATURE: 98 F | RESPIRATION RATE: 20 BRPM | SYSTOLIC BLOOD PRESSURE: 173 MMHG

## 2017-12-11 DIAGNOSIS — M81.0 SENILE OSTEOPOROSIS: Primary | ICD-10-CM

## 2017-12-11 DIAGNOSIS — M81.0 SENILE OSTEOPOROSIS: ICD-10-CM

## 2017-12-11 DIAGNOSIS — Z17.1 MALIGNANT NEOPLASM OF BREAST IN FEMALE, ESTROGEN RECEPTOR NEGATIVE, UNSPECIFIED LATERALITY, UNSPECIFIED SITE OF BREAST: Primary | ICD-10-CM

## 2017-12-11 DIAGNOSIS — C50.011 MALIGNANT NEOPLASM OF NIPPLE OF RIGHT BREAST IN FEMALE, UNSPECIFIED ESTROGEN RECEPTOR STATUS: Primary | ICD-10-CM

## 2017-12-11 DIAGNOSIS — C50.919 MALIGNANT NEOPLASM OF BREAST IN FEMALE, ESTROGEN RECEPTOR NEGATIVE, UNSPECIFIED LATERALITY, UNSPECIFIED SITE OF BREAST: Primary | ICD-10-CM

## 2017-12-11 PROCEDURE — 99213 OFFICE O/P EST LOW 20 MIN: CPT | Mod: PBBFAC,PN | Performed by: INTERNAL MEDICINE

## 2017-12-11 PROCEDURE — 77080 DXA BONE DENSITY AXIAL: CPT | Mod: TC,PO

## 2017-12-11 PROCEDURE — 99999 PR PBB SHADOW E&M-EST. PATIENT-LVL III: CPT | Mod: PBBFAC,,, | Performed by: INTERNAL MEDICINE

## 2017-12-11 PROCEDURE — 77080 DXA BONE DENSITY AXIAL: CPT | Mod: 26,,, | Performed by: RADIOLOGY

## 2017-12-11 PROCEDURE — 99205 OFFICE O/P NEW HI 60 MIN: CPT | Mod: S$PBB,,, | Performed by: INTERNAL MEDICINE

## 2017-12-11 NOTE — LETTER
December 11, 2017        Pasha Joseph MD  [address] [facility]  [address]  [facility] [address]             [facility]  [address]  [place] [address]  Phone: [phone]  Fax: [phone]   Patient: Sarahy Coronel   MR Number: [id number]   YOB: 1945   Date of Visit: 12/11/2017       Dear Dr. Joseph:    Thank you for referring Dymple Seal to me for evaluation of right breast cancer. Below are the relevant portions of my assessment and plan of care.  If you have questions, please do not hesitate to call me. I look forward to following Sarahy along with you.    Sincerely,      MD RYAN Luna MD Scott C. Montgomery, MD

## 2017-12-12 NOTE — PROGRESS NOTES
Date of Service: 2017  HISTORY OF PRESENT ILLNESS:  A 72-year-old white female who was in her usual   state of health, had a screening mammography, and was found to have a suspicious   lesion in the right breast.  This was biopsied, proven to be papillary   carcinoma.  The patient underwent lumpectomy and did not attain clear margins.    She opted for completion mastectomy.  There was residual papillary and   intraductal carcinoma at the time of mastectomy.  [place] lymph node was   negative.  The patient's tumor is intensely positive for ER/IL and HER-2/reynaldo   negative.  Postoperatively, the patient is recovering uneventfully and is not   having issues with wound healing, uncontrolled pain, lymphedema or decreased   range of motion of the right upper extremity.  No other complaints or pertinent   findings on a 14-point review of systems.    PAST MEDICAL HISTORY:  1.  Hypothyroidism.  2.  Colon polyps.  3.  Senile osteoporosis.  4.  Insomnia.  5.  Hyperlipidemia.  6.  GERD.  7.  Fibrocystic breast disease.  8.  Allergic rhinitis.    ALLERGIES:  Penicillin, Fosamax, Demerol, erythromycin, Lorabid.    MEDICATIONS:  1.  Ecotrin 81 mg daily.  2.  Calcium with D one tab daily.  3.  Vitamin D3 2000 units daily.  4.  Fish oil 2 g daily.  5.  Probiotic one daily.  6.  Synthroid 88 mcg daily.  7.  Loratadine 10 mg daily.    FAMILY AND SOCIAL HISTORY:  Nonsmoker, nonalcohol user.  Father  from   unknown malignancy.    PHYSICAL EXAMINATION:  GENERAL:  Well-developed, well-nourished elderly white female in no acute   distress.  VITAL SIGNS:  Weight of 159.  HEENT:  Normocephalic, atraumatic.  Oral mucosa pink and moist.  Lips without   lesions.  Tongue midline.  Oropharynx clear.  Nonicteric sclerae.   NECK:  Supple, no adenopathy.  No carotid bruits, thyromegaly or thyroid nodule.                                                                        HEART:  Regular rate and rhythm without murmur, gallop or rub.                 LUNGS:  Clear to auscultation bilaterally.  Normal respiratory effort.       ABDOMEN:  Soft, nontender, nondistended with positive normoactive bowel sounds,   no hepatosplenomegaly.    EXTREMITIES:  No cyanosis, clubbing or edema.  Distal pulses are intact.                                              AXILLAE AND GROIN:  No palpable pathologic lymphadenopathy is appreciated.        SKIN:  Intact/turgor normal                                                              NEUROLOGIC:  Cranial nerves II-XII grossly intact.  Motor:  Good muscle bulk and   tone.  Strength/sensory 5/5 throughout.  Gait stable.   BREASTS:  The patient's left breast is free from masses, tenderness, and nipple   discharge.  Right breast is surgically absent.  Mastectomy scars well   approximated and healed.    IMPRESSION:  Stage I (T1b, M0, N0) papillary carcinoma of the right breast.    PLAN:  1.  Baseline lab to include CBC, CMP, LDH, and magnesium.  2.  Bone mineral density.  3.  Oncotype DX analysis.  4.  The patient to return to clinic to review interval studies and to finalize   plans for adjuvant therapy.        BERNICE/HN  dd: 12/11/2017 11:06:42 (CST)  td: 12/12/2017 01:45:59 (CST)  Doc ID   #[id number]  Job ID #[id number]    CC:

## 2018-01-09 ENCOUNTER — TELEPHONE (OUTPATIENT)
Dept: HEMATOLOGY/ONCOLOGY | Facility: CLINIC | Age: 73
End: 2018-01-09

## 2018-01-09 NOTE — TELEPHONE ENCOUNTER
Returned call to patient, lab results are normal will call patient to schedule follow up with results of oncotype

## 2018-01-09 NOTE — TELEPHONE ENCOUNTER
----- Message from Shavonne Rouse sent at 1/9/2018  1:42 PM CST -----  Contact: self  Would like results to lab work done 12/11. Please call back at [phone] (dxav)

## 2018-01-10 ENCOUNTER — TELEPHONE (OUTPATIENT)
Dept: HEMATOLOGY/ONCOLOGY | Facility: CLINIC | Age: 73
End: 2018-01-10

## 2018-02-06 ENCOUNTER — TELEPHONE (OUTPATIENT)
Dept: HEMATOLOGY/ONCOLOGY | Facility: CLINIC | Age: 73
End: 2018-02-06

## 2018-02-06 NOTE — TELEPHONE ENCOUNTER
Returned call to [facility], explained dr. Ricardo has been out of clinic for past few days due to being ill and will have him sign once he returnes

## 2018-02-08 ENCOUNTER — OFFICE VISIT (OUTPATIENT)
Dept: FAMILY MEDICINE | Facility: CLINIC | Age: 73
End: 2018-02-08
Payer: MEDICARE

## 2018-02-08 VITALS
SYSTOLIC BLOOD PRESSURE: 132 MMHG | DIASTOLIC BLOOD PRESSURE: 84 MMHG | HEIGHT: 66 IN | BODY MASS INDEX: 25.83 KG/M2 | WEIGHT: 160.69 LBS | HEART RATE: 62 BPM

## 2018-02-08 DIAGNOSIS — K21.9 GASTROESOPHAGEAL REFLUX DISEASE WITHOUT ESOPHAGITIS: ICD-10-CM

## 2018-02-08 DIAGNOSIS — E78.5 HYPERLIPIDEMIA, UNSPECIFIED HYPERLIPIDEMIA TYPE: Primary | ICD-10-CM

## 2018-02-08 DIAGNOSIS — Z12.39 SCREENING FOR BREAST CANCER: ICD-10-CM

## 2018-02-08 DIAGNOSIS — E03.9 ACQUIRED HYPOTHYROIDISM: ICD-10-CM

## 2018-02-08 DIAGNOSIS — C50.919 MALIGNANT NEOPLASM OF FEMALE BREAST, UNSPECIFIED ESTROGEN RECEPTOR STATUS, UNSPECIFIED LATERALITY, UNSPECIFIED SITE OF BREAST: ICD-10-CM

## 2018-02-08 PROBLEM — N63.0 BREAST MASS IN FEMALE: Status: RESOLVED | Noted: 2017-10-30 | Resolved: 2018-02-08

## 2018-02-08 PROCEDURE — 1159F MED LIST DOCD IN RCRD: CPT | Mod: ,,, | Performed by: FAMILY MEDICINE

## 2018-02-08 PROCEDURE — 1126F AMNT PAIN NOTED NONE PRSNT: CPT | Mod: ,,, | Performed by: FAMILY MEDICINE

## 2018-02-08 PROCEDURE — 99213 OFFICE O/P EST LOW 20 MIN: CPT | Mod: PBBFAC,PO | Performed by: FAMILY MEDICINE

## 2018-02-08 PROCEDURE — 99214 OFFICE O/P EST MOD 30 MIN: CPT | Mod: S$PBB,,, | Performed by: FAMILY MEDICINE

## 2018-02-08 PROCEDURE — 99999 PR PBB SHADOW E&M-EST. PATIENT-LVL III: CPT | Mod: PBBFAC,,, | Performed by: FAMILY MEDICINE

## 2018-02-12 ENCOUNTER — TELEPHONE (OUTPATIENT)
Dept: HEMATOLOGY/ONCOLOGY | Facility: CLINIC | Age: 73
End: 2018-02-12

## 2018-02-12 NOTE — TELEPHONE ENCOUNTER
----- Message from SANTI Nogueira MD sent at 2/8/2018 11:15 AM CST -----  Pt at office today asking about oncotype test results.  Willing to come in if needed.

## 2018-02-12 NOTE — TELEPHONE ENCOUNTER
Received return call from patient, notified her that her oncotype results had to be resubmitted due to not enough tissue for testing. Currently pending results, will call Shoozy and request update and update patient

## 2018-02-20 ENCOUNTER — TELEPHONE (OUTPATIENT)
Dept: HEMATOLOGY/ONCOLOGY | Facility: CLINIC | Age: 73
End: 2018-02-20

## 2018-02-20 NOTE — TELEPHONE ENCOUNTER
----- Message from Edith Jenkins sent at 2/20/2018  2:27 PM CST -----  Contact: self  [phone]  Patient called stating she is schedule to see the doctor 02/28/2018. Thanks!

## 2018-02-20 NOTE — TELEPHONE ENCOUNTER
Called patient to schedule follow up appointment per dr. Ricardo. No oncotype results due to insufficient tissue.

## 2018-02-28 ENCOUNTER — OFFICE VISIT (OUTPATIENT)
Dept: HEMATOLOGY/ONCOLOGY | Facility: CLINIC | Age: 73
End: 2018-02-28
Payer: MEDICARE

## 2018-02-28 VITALS
TEMPERATURE: 98 F | SYSTOLIC BLOOD PRESSURE: 168 MMHG | RESPIRATION RATE: 16 BRPM | DIASTOLIC BLOOD PRESSURE: 75 MMHG | HEART RATE: 62 BPM | WEIGHT: 162.69 LBS | HEIGHT: 66 IN | BODY MASS INDEX: 26.14 KG/M2

## 2018-02-28 DIAGNOSIS — C50.011 MALIGNANT NEOPLASM OF NIPPLE OF RIGHT BREAST IN FEMALE, UNSPECIFIED ESTROGEN RECEPTOR STATUS: Primary | ICD-10-CM

## 2018-02-28 DIAGNOSIS — M81.0 SENILE OSTEOPOROSIS: ICD-10-CM

## 2018-02-28 PROCEDURE — 99214 OFFICE O/P EST MOD 30 MIN: CPT | Mod: S$PBB,,, | Performed by: INTERNAL MEDICINE

## 2018-02-28 PROCEDURE — 1126F AMNT PAIN NOTED NONE PRSNT: CPT | Mod: ,,, | Performed by: INTERNAL MEDICINE

## 2018-02-28 PROCEDURE — 99213 OFFICE O/P EST LOW 20 MIN: CPT | Mod: PBBFAC,PN | Performed by: INTERNAL MEDICINE

## 2018-02-28 PROCEDURE — 99999 PR PBB SHADOW E&M-EST. PATIENT-LVL III: CPT | Mod: PBBFAC,,, | Performed by: INTERNAL MEDICINE

## 2018-02-28 PROCEDURE — 1159F MED LIST DOCD IN RCRD: CPT | Mod: ,,, | Performed by: INTERNAL MEDICINE

## 2018-02-28 RX ORDER — ANASTROZOLE 1 MG/1
1 TABLET ORAL DAILY
Qty: 90 TABLET | Refills: 3 | Status: SHIPPED | OUTPATIENT
Start: 2018-02-28 | End: 2018-12-14 | Stop reason: SDUPTHER

## 2018-03-01 NOTE — PROGRESS NOTES
Date of Service: 02/28/2018  HISTORY OF PRESENT ILLNESS:  The patient is a 72-year-old white female known to   me for stage I (T1b, N0, M0) papillary right breast carcinoma.  She is status   post completion mastectomy for residual disease.  She returns to review results   of Oncotype DX analysis.  Unfortunately, the lab was unable to obtain sufficient   tissue to run this analysis despite two  attempts.  She returns to clinic to   finalize plans of adjuvant therapy.  No new complaint.  No new pertinent   findings on a 14-point review of systems.    PHYSICAL EXAMINATION:  GENERAL:  Well-developed, well-nourished elderly white female in no acute   distress.  VITAL SIGNS:  Weight 162-1/2 pounds.  Documented in EMR and reviewed.  HEENT:  Normocephalic, atraumatic.  Oral mucosa pink and moist.  Lips without   lesions.  Tongue midline.  Oropharynx clear.  Nonicteric sclerae.   NECK:  Supple, no adenopathy.  No carotid bruits, thyromegaly or thyroid nodule.  HEART:  Regular rate and rhythm without murmur, gallop or rub.                LUNGS:  Clear to auscultation bilaterally.  Normal respiratory effort.       ABDOMEN:  Soft, nontender, nondistended with positive normoactive bowel sounds,   no hepatosplenomegaly.    EXTREMITIES:  No cyanosis, clubbing or edema.  Distal pulses are intact.  AXILLAE AND GROIN:  No palpable pathologic lymphadenopathy is appreciated.        SKIN:  Intact/turgor normal.  NEUROLOGIC:  Cranial nerves II-XII grossly intact.  Motor:  Good muscle bulk and   tone.  Strength/sensory 5/5 throughout.  Gait stable.    LABORATORY:  Bone mineral density significant senile osteoporosis with 31% risk   of major fracture over 10 years.    IMPRESSION:  1.  Stage I papillary right breast carcinoma.  2.  Senile osteoporosis.    PLAN:  1.  We will proceed with adjuvant Arimidex 1 mg daily with a planned duration of   60 months.  2.  Continue calcium supplementation with vitamin D.  3.  Begin Prolia 60 mg subQ  every six months for prevention of aromatase   inhibitor-induced bone loss as well as treatment of senile osteoporosis.  4.  Reevaluate in three months from now without interval lab.      ELGIN  dd: 02/28/2018 10:07:35 (CST)  td: 03/01/2018 03:44:49 (CST)  Doc ID   #[id number]  Job ID #[id number]    CC:

## 2018-03-05 ENCOUNTER — TELEPHONE (OUTPATIENT)
Dept: HEMATOLOGY/ONCOLOGY | Facility: CLINIC | Age: 73
End: 2018-03-05

## 2018-03-05 NOTE — TELEPHONE ENCOUNTER
----- Message from Kelsy Navarro sent at 3/5/2018  3:06 PM CST -----  When do you want me to schedule prolia injection we have auth

## 2018-03-07 ENCOUNTER — TELEPHONE (OUTPATIENT)
Dept: INFUSION THERAPY | Facility: HOSPITAL | Age: 73
End: 2018-03-07

## 2018-03-07 NOTE — TELEPHONE ENCOUNTER
Yes.  I spoke with Florina and she said to please schedule pt for prolia.  Thanks.         Documentation       Khadra Owen LPN 2 days ago         ----- Message from Kelsy Navarro sent at 3/5/2018  3:06 PM CST -----  When do you want me to schedule prolia injection we have auth

## 2018-03-13 ENCOUNTER — TELEPHONE (OUTPATIENT)
Dept: INFUSION THERAPY | Facility: HOSPITAL | Age: 73
End: 2018-03-13

## 2018-03-13 NOTE — TELEPHONE ENCOUNTER
CA of 10.2 from 12/11/17 acceptable for prolia scheduled on 3/14/18. No new labs needed. Per CASEY Heaton for Dr. Ricardo.

## 2018-03-14 ENCOUNTER — INFUSION (OUTPATIENT)
Dept: INFUSION THERAPY | Facility: HOSPITAL | Age: 73
End: 2018-03-14
Attending: INTERNAL MEDICINE
Payer: MEDICARE

## 2018-03-14 VITALS
WEIGHT: 163.81 LBS | DIASTOLIC BLOOD PRESSURE: 69 MMHG | HEIGHT: 66 IN | TEMPERATURE: 98 F | BODY MASS INDEX: 26.33 KG/M2 | HEART RATE: 57 BPM | OXYGEN SATURATION: 99 % | RESPIRATION RATE: 16 BRPM | SYSTOLIC BLOOD PRESSURE: 172 MMHG

## 2018-03-14 DIAGNOSIS — M81.0 SENILE OSTEOPOROSIS: Primary | ICD-10-CM

## 2018-03-14 PROCEDURE — 63600175 PHARM REV CODE 636 W HCPCS: Mod: TB,PN | Performed by: INTERNAL MEDICINE

## 2018-03-14 PROCEDURE — 96372 THER/PROPH/DIAG INJ SC/IM: CPT | Mod: PN

## 2018-03-14 RX ADMIN — DENOSUMAB 60 MG: 60 INJECTION SUBCUTANEOUS at 09:03

## 2018-03-14 NOTE — PLAN OF CARE
Problem: Patient Care Overview (Adult)  Goal: Discharge Needs Assessment  Tolerated injection without any distress.  Reviewed upcoming appointments.  Amb off unit independently by self.

## 2018-03-23 ENCOUNTER — TELEPHONE (OUTPATIENT)
Dept: HEMATOLOGY/ONCOLOGY | Facility: CLINIC | Age: 73
End: 2018-03-23

## 2018-03-23 NOTE — TELEPHONE ENCOUNTER
----- Message from Martha Whitley sent at 3/23/2018  1:20 PM CDT -----  Contact: Hailey Hart with [facility] [phone] is calling to check the status of physicians order that she faxed to Dr RUDDY Zamora on 03 13 18/please advise

## 2018-04-06 ENCOUNTER — HOSPITAL ENCOUNTER (OUTPATIENT)
Dept: RADIOLOGY | Facility: HOSPITAL | Age: 73
Discharge: HOME | End: 2018-04-06
Attending: FAMILY MEDICINE
Payer: MEDICARE

## 2018-04-06 DIAGNOSIS — N64.4 BREAST PAIN: ICD-10-CM

## 2018-04-06 PROCEDURE — 77061 BREAST TOMOSYNTHESIS UNI: CPT | Mod: TC,PO,LT

## 2018-04-06 PROCEDURE — 76642 ULTRASOUND BREAST LIMITED: CPT | Mod: TC,PO,LT

## 2018-04-06 PROCEDURE — 77061 BREAST TOMOSYNTHESIS UNI: CPT | Mod: 26,LT,, | Performed by: RADIOLOGY

## 2018-04-06 PROCEDURE — 77065 DX MAMMO INCL CAD UNI: CPT | Mod: TC,PO,LT

## 2018-04-06 PROCEDURE — 76642 ULTRASOUND BREAST LIMITED: CPT | Mod: 26,LT,, | Performed by: RADIOLOGY

## 2018-04-06 PROCEDURE — 77065 DX MAMMO INCL CAD UNI: CPT | Mod: 26,LT,, | Performed by: RADIOLOGY

## 2018-05-23 ENCOUNTER — TELEPHONE (OUTPATIENT)
Dept: HEMATOLOGY/ONCOLOGY | Facility: CLINIC | Age: 73
End: 2018-05-23

## 2018-05-28 ENCOUNTER — TELEPHONE (OUTPATIENT)
Dept: HEMATOLOGY/ONCOLOGY | Facility: CLINIC | Age: 73
End: 2018-05-28

## 2018-05-28 NOTE — TELEPHONE ENCOUNTER
Received call from patient who was calling back to rescheduled her 5/28/18 follow up as MD is out today. Rescheduled to 6/13/18 at 11:20 am. Patient voiced understanding and appreciation.

## 2018-06-13 ENCOUNTER — OFFICE VISIT (OUTPATIENT)
Dept: HEMATOLOGY/ONCOLOGY | Facility: CLINIC | Age: 73
End: 2018-06-13
Payer: MEDICARE

## 2018-06-13 VITALS
HEART RATE: 70 BPM | WEIGHT: 161.19 LBS | RESPIRATION RATE: 20 BRPM | SYSTOLIC BLOOD PRESSURE: 142 MMHG | BODY MASS INDEX: 25.9 KG/M2 | TEMPERATURE: 98 F | DIASTOLIC BLOOD PRESSURE: 82 MMHG | HEIGHT: 66 IN

## 2018-06-13 DIAGNOSIS — C50.911 ER+ PR+ CARCINOMA OF BREAST, RIGHT: ICD-10-CM

## 2018-06-13 DIAGNOSIS — M81.0 SENILE OSTEOPOROSIS: ICD-10-CM

## 2018-06-13 DIAGNOSIS — Z17.0 MALIGNANT NEOPLASM OF RIGHT BREAST IN FEMALE, ESTROGEN RECEPTOR POSITIVE, UNSPECIFIED SITE OF BREAST: Primary | ICD-10-CM

## 2018-06-13 DIAGNOSIS — Z17.0 ER+ PR+ CARCINOMA OF BREAST, RIGHT: ICD-10-CM

## 2018-06-13 DIAGNOSIS — Z90.11 HISTORY OF RIGHT MASTECTOMY: ICD-10-CM

## 2018-06-13 DIAGNOSIS — C50.911 HER2-NEGATIVE CARCINOMA OF RIGHT BREAST: ICD-10-CM

## 2018-06-13 DIAGNOSIS — C50.911 MALIGNANT NEOPLASM OF RIGHT BREAST IN FEMALE, ESTROGEN RECEPTOR POSITIVE, UNSPECIFIED SITE OF BREAST: Primary | ICD-10-CM

## 2018-06-13 PROCEDURE — 99213 OFFICE O/P EST LOW 20 MIN: CPT | Mod: PBBFAC,PN | Performed by: NURSE PRACTITIONER

## 2018-06-13 PROCEDURE — 99213 OFFICE O/P EST LOW 20 MIN: CPT | Mod: S$PBB,,, | Performed by: NURSE PRACTITIONER

## 2018-06-13 PROCEDURE — 99999 PR PBB SHADOW E&M-EST. PATIENT-LVL III: CPT | Mod: PBBFAC,,, | Performed by: NURSE PRACTITIONER

## 2018-06-13 NOTE — PROGRESS NOTES
HISTORY OF PRESENT ILLNESS:  The patient is a 72-year-old white female   known to Dr. Ricardo for Stage I (T1b, N0, M0) papillary right breast carcinoma.    Tumor was found to be ER/PA positive & Oyt5pdl negative.  She is status   post right mastectomy (11/16/17) for residual disease per Dr. Starr.    Oncotype DX did not have enough tissue for analysis.  She was started on   Arimidex on her last visit (03/2018).  She received her 1st Prolia injection on   03/14/2018.  She returns to clinic today for her 3 month post evaluation with   no complaints.  She denies any difficulties with new breast concerns, chest   pain, nipple discharge, unbearable hot flashes, fevers, chills, abdominal   discomfort, nausea, vomiting, diarrhea, bleeding, etc.   No new complaints   or pertinent findings on a 14-point review of systems.    PHYSICAL EXAMINATION:  GENERAL:  Well-developed, well-nourished elderly white female in no acute   distress.  Alert & oriented x 3.  VITAL SIGNS:  Weight:  stable  Wt Readings from Last 3 Encounters:   06/13/18 73.1 kg (161 lb 2.5 oz)   03/14/18 74.3 kg (163 lb 12.8 oz)   02/28/18 73.8 kg (162 lb 11.2 oz)     Temp Readings from Last 3 Encounters:   06/13/18 98.1 °F (36.7 °C)   03/14/18 97.8 °F (36.6 °C)   02/28/18 98.4 °F (36.9 °C) (Oral)     BP Readings from Last 3 Encounters:   06/13/18 (!) 142/82   03/14/18 (!) 172/69   02/28/18 (!) 168/75     Pulse Readings from Last 3 Encounters:   06/13/18 70   03/14/18 (!) 57   02/28/18 62     HEENT:  Normocephalic, atraumatic.  Oral mucosa pink and moist.  Lips without   lesions.  Tongue midline.  Oropharynx clear.  Nonicteric sclerae.   NECK:  Supple, no adenopathy.  No carotid bruits, thyromegaly or thyroid nodule.  HEART:  Regular rate and rhythm without murmur, gallop or rub.                LUNGS:  Clear to auscultation bilaterally.  Normal respiratory effort.       ABDOMEN:  Soft, nontender, nondistended with positive normoactive bowel sounds,   no  hepatosplenomegaly.    EXTREMITIES:  No cyanosis, clubbing or edema.  Distal pulses are intact.  AXILLAE AND GROIN:  No palpable pathologic lymphadenopathy is appreciated.        SKIN:  Intact/turgor normal.  NEUROLOGIC:  Cranial nerves II-XII grossly intact.  Motor:  Good muscle bulk and   tone.  Strength/sensory 5/5 throughout.  Gait stable.    No Laboratory    RADIOLOGY:  Mammogram (left) dated 04/06/2018:  Impression:  No mammographic evidence of malignancy.   BI-RADS Category 1: Negative   Recommendation:  Routine screening mammogram in 1 year is recommended.    IMPRESSION:  1.  Stage I papillary right breast carcinoma - s/p right mastectomy 11/16/2017  2.  Senile osteoporosis - last BMD 12/11/2017    PLAN:  1.  Continue Arimidex 1 mg daily with a planned duration of 60 months; started 03/2018.  2.  Continue calcium supplementation with vitamin D.  3.  Return to clinic in 3 months for 6 month post evaluation and next Prolia with interval   BMP & Vitamin D.      Assessment/plan reviewed and approved by Dr. Ricardo.

## 2018-08-09 ENCOUNTER — LAB VISIT (OUTPATIENT)
Dept: LAB | Facility: HOSPITAL | Age: 73
End: 2018-08-09
Attending: FAMILY MEDICINE
Payer: MEDICARE

## 2018-08-09 ENCOUNTER — OFFICE VISIT (OUTPATIENT)
Dept: FAMILY MEDICINE | Facility: CLINIC | Age: 73
End: 2018-08-09
Payer: MEDICARE

## 2018-08-09 VITALS
WEIGHT: 164 LBS | SYSTOLIC BLOOD PRESSURE: 144 MMHG | BODY MASS INDEX: 26.36 KG/M2 | DIASTOLIC BLOOD PRESSURE: 82 MMHG | HEIGHT: 66 IN | HEART RATE: 52 BPM

## 2018-08-09 DIAGNOSIS — R03.0 ELEVATED BLOOD PRESSURE READING IN OFFICE WITHOUT DIAGNOSIS OF HYPERTENSION: ICD-10-CM

## 2018-08-09 DIAGNOSIS — E78.5 HYPERLIPIDEMIA, UNSPECIFIED HYPERLIPIDEMIA TYPE: Primary | ICD-10-CM

## 2018-08-09 DIAGNOSIS — E03.9 ACQUIRED HYPOTHYROIDISM: ICD-10-CM

## 2018-08-09 DIAGNOSIS — L57.0 AK (ACTINIC KERATOSIS): ICD-10-CM

## 2018-08-09 DIAGNOSIS — E78.5 HYPERLIPIDEMIA, UNSPECIFIED HYPERLIPIDEMIA TYPE: ICD-10-CM

## 2018-08-09 LAB
ALBUMIN SERPL BCP-MCNC: 4 G/DL
ALP SERPL-CCNC: 50 U/L
ALT SERPL W/O P-5'-P-CCNC: 23 U/L
ANION GAP SERPL CALC-SCNC: 6 MMOL/L
AST SERPL-CCNC: 23 U/L
BASOPHILS # BLD AUTO: 0.03 K/UL
BASOPHILS NFR BLD: 0.5 %
BILIRUB SERPL-MCNC: 0.5 MG/DL
BUN SERPL-MCNC: 21 MG/DL
CALCIUM SERPL-MCNC: 10.5 MG/DL
CHLORIDE SERPL-SCNC: 105 MMOL/L
CO2 SERPL-SCNC: 28 MMOL/L
CREAT SERPL-MCNC: 0.8 MG/DL
DIFFERENTIAL METHOD: NORMAL
EOSINOPHIL # BLD AUTO: 0.1 K/UL
EOSINOPHIL NFR BLD: 1.7 %
ERYTHROCYTE [DISTWIDTH] IN BLOOD BY AUTOMATED COUNT: 12.6 %
EST. GFR  (AFRICAN AMERICAN): >60 ML/MIN/1.73 M^2
EST. GFR  (NON AFRICAN AMERICAN): >60 ML/MIN/1.73 M^2
GLUCOSE SERPL-MCNC: 92 MG/DL
HCT VFR BLD AUTO: 39.6 %
HGB BLD-MCNC: 12.9 G/DL
IMM GRANULOCYTES # BLD AUTO: 0.01 K/UL
IMM GRANULOCYTES NFR BLD AUTO: 0.2 %
LYMPHOCYTES # BLD AUTO: 1.9 K/UL
LYMPHOCYTES NFR BLD: 32 %
MCH RBC QN AUTO: 30.2 PG
MCHC RBC AUTO-ENTMCNC: 32.6 G/DL
MCV RBC AUTO: 93 FL
MONOCYTES # BLD AUTO: 0.6 K/UL
MONOCYTES NFR BLD: 9.4 %
NEUTROPHILS # BLD AUTO: 3.3 K/UL
NEUTROPHILS NFR BLD: 56.2 %
NRBC BLD-RTO: 0 /100 WBC
PLATELET # BLD AUTO: 219 K/UL
PMV BLD AUTO: 12 FL
POTASSIUM SERPL-SCNC: 5.1 MMOL/L
PROT SERPL-MCNC: 7.7 G/DL
RBC # BLD AUTO: 4.27 M/UL
SODIUM SERPL-SCNC: 139 MMOL/L
TSH SERPL DL<=0.005 MIU/L-ACNC: 0.86 UIU/ML
WBC # BLD AUTO: 5.84 K/UL

## 2018-08-09 PROCEDURE — 85025 COMPLETE CBC W/AUTO DIFF WBC: CPT

## 2018-08-09 PROCEDURE — 36415 COLL VENOUS BLD VENIPUNCTURE: CPT | Mod: PO

## 2018-08-09 PROCEDURE — 99999 PR PBB SHADOW E&M-EST. PATIENT-LVL III: CPT | Mod: PBBFAC,,, | Performed by: FAMILY MEDICINE

## 2018-08-09 PROCEDURE — 99214 OFFICE O/P EST MOD 30 MIN: CPT | Mod: S$PBB,,, | Performed by: FAMILY MEDICINE

## 2018-08-09 PROCEDURE — 84443 ASSAY THYROID STIM HORMONE: CPT

## 2018-08-09 PROCEDURE — 99213 OFFICE O/P EST LOW 20 MIN: CPT | Mod: PBBFAC,PO | Performed by: FAMILY MEDICINE

## 2018-08-09 PROCEDURE — 80053 COMPREHEN METABOLIC PANEL: CPT

## 2018-08-09 RX ORDER — CLONAZEPAM 0.5 MG/1
0.5 TABLET ORAL DAILY PRN
Qty: 10 TABLET | Refills: 0 | Status: SHIPPED | OUTPATIENT
Start: 2018-08-09 | End: 2019-02-14

## 2018-08-09 NOTE — PROGRESS NOTES
Subjective:       Patient ID: Sarahy Coronel is a 72 y.o. female.    Chief Complaint: Hyperlipidemia and Hypothyroidism    Here f/u chronic medical issues. States doing well.  She would like area on leg cryo; similar to previous areas derm has done but forgot to tell them about.  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 Sister  recently.      Hyperlipidemia   This is a chronic problem. The current episode started more than 1 year ago. The problem is controlled. Pertinent negatives include no chest pain or shortness of breath.     Review of Systems   Constitutional: Negative for chills, fatigue and fever.   Respiratory: Negative for cough, chest tightness and shortness of breath.    Cardiovascular: Negative for chest pain, palpitations and leg swelling.   Gastrointestinal: Negative for abdominal distention and abdominal pain.   Endocrine: Negative for cold intolerance and heat intolerance.   Skin: Negative for rash.   Psychiatric/Behavioral: Negative for dysphoric mood. The patient is not nervous/anxious.        Objective:      Physical Exam   Constitutional: She appears well-developed and well-nourished.   HENT:   Head: Normocephalic and atraumatic.   Cardiovascular: Normal rate, regular rhythm and normal heart sounds.    Pulmonary/Chest: Effort normal and breath sounds normal.   Psychiatric: She has a normal mood and affect.   Nursing note and vitals reviewed.    Cryotherapy  Date/Time: 2018 11:04 AM  Performed by: SANTI HUGHES  Authorized by: SANTI HUGHES     Consent Done?:  Yes (Verbal)    Indications:  Other    Location(s):    Lower Extremity:  Leg        Detail:  right lower leg      Assessment:       1. Hyperlipidemia, unspecified hyperlipidemia type    2. Elevated blood pressure reading in office without diagnosis of hypertension    3. Acquired hypothyroidism    4. AK (actinic keratosis)        Plan:       Hyperlipidemia, unspecified hyperlipidemia type  -     CBC auto differential; Future;  Expected date: 08/09/2018  -     Comprehensive metabolic panel; Future; Expected date: 08/09/2018  -     TSH; Future; Expected date: 08/09/2018    Elevated blood pressure reading in office without diagnosis of hypertension    Acquired hypothyroidism  -     CBC auto differential; Future; Expected date: 08/09/2018  -     Comprehensive metabolic panel; Future; Expected date: 08/09/2018  -     TSH; Future; Expected date: 08/09/2018    AK (actinic keratosis)    Other orders  -     Cryotherapy  -     clonazePAM (KLONOPIN) 0.5 MG tablet; Take 1 tablet (0.5 mg total) by mouth daily as needed for Anxiety.  Dispense: 10 tablet; Refill: 0        Visit with Kassandra in [place] next month with home bp log.  F/u with derm as planned.  Will monitor chronic medical issues and continue current plan of care.    Follow-up in about 1 month (around 9/9/2018), or if symptoms worsen or fail to improve.

## 2018-09-10 ENCOUNTER — OFFICE VISIT (OUTPATIENT)
Dept: PRIMARY CARE CLINIC | Facility: CLINIC | Age: 73
End: 2018-09-10
Payer: MEDICARE

## 2018-09-10 VITALS
WEIGHT: 166 LBS | SYSTOLIC BLOOD PRESSURE: 150 MMHG | TEMPERATURE: 98 F | HEIGHT: 66 IN | OXYGEN SATURATION: 98 % | HEART RATE: 62 BPM | DIASTOLIC BLOOD PRESSURE: 84 MMHG | BODY MASS INDEX: 26.68 KG/M2

## 2018-09-10 DIAGNOSIS — F51.01 PRIMARY INSOMNIA: ICD-10-CM

## 2018-09-10 DIAGNOSIS — R03.0 ELEVATED BLOOD PRESSURE READING IN OFFICE WITHOUT DIAGNOSIS OF HYPERTENSION: Primary | ICD-10-CM

## 2018-09-10 PROCEDURE — 99213 OFFICE O/P EST LOW 20 MIN: CPT | Mod: S$GLB,,, | Performed by: NURSE PRACTITIONER

## 2018-09-10 RX ORDER — ASPIRIN 81 MG/1
81 TABLET ORAL DAILY
COMMUNITY
End: 2019-02-14

## 2018-09-10 NOTE — PROGRESS NOTES
Subjective:       Patient ID: Sarahy Coronel is a 72 y.o. female.    Chief Complaint: Hypertension  She was last seen in primary care by Jero on 08/09/20148. I last saw her on 09/13/2016  HPI   She is here to follow up with HTN. States dealing with stress with loss of sister in May and diagnosis of breast cancer last year.  Vitals:    09/10/18 1328   BP: (!) 150/84   Pulse: 62   Temp: 98.2 °F (36.8 °C)     BP Readings from Last 3 Encounters:   09/10/18 (!) 150/84   08/09/18 (!) 144/82   06/13/18 (!) 142/82     Below are her home blood pressure readings:                        Review of Systems    CMP  Sodium   Date Value Ref Range Status   08/09/2018 139 136 - 145 mmol/L Final     Potassium   Date Value Ref Range Status   08/09/2018 5.1 3.5 - 5.1 mmol/L Final     Chloride   Date Value Ref Range Status   08/09/2018 105 95 - 110 mmol/L Final     CO2   Date Value Ref Range Status   08/09/2018 28 23 - 29 mmol/L Final     Glucose   Date Value Ref Range Status   08/09/2018 92 70 - 110 mg/dL Final     BUN, Bld   Date Value Ref Range Status   08/09/2018 21 8 - 23 mg/dL Final     Creatinine   Date Value Ref Range Status   08/09/2018 0.8 0.5 - 1.4 mg/dL Final     Calcium   Date Value Ref Range Status   08/09/2018 10.5 8.7 - 10.5 mg/dL Final     Total Protein   Date Value Ref Range Status   08/09/2018 7.7 6.0 - 8.4 g/dL Final     Albumin   Date Value Ref Range Status   08/09/2018 4.0 3.5 - 5.2 g/dL Final     Total Bilirubin   Date Value Ref Range Status   08/09/2018 0.5 0.1 - 1.0 mg/dL Final     Comment:     For infants and newborns, interpretation of results should be based  on gestational age, weight and in agreement with clinical  observations.  Premature Infant recommended reference ranges:  Up to 24 hours.............<8.0 mg/dL  Up to 48 hours............<12.0 mg/dL  3-5 days..................<15.0 mg/dL  6-29 days.................<15.0 mg/dL       Alkaline Phosphatase   Date Value Ref Range Status   08/09/2018 50 (L)  55 - 135 U/L Final     AST   Date Value Ref Range Status   08/09/2018 23 10 - 40 U/L Final     ALT   Date Value Ref Range Status   08/09/2018 23 10 - 44 U/L Final     Anion Gap   Date Value Ref Range Status   08/09/2018 6 (L) 8 - 16 mmol/L Final     eGFR if    Date Value Ref Range Status   08/09/2018 >60.0 >60 mL/min/1.73 m^2 Final     eGFR if non    Date Value Ref Range Status   08/09/2018 >60.0 >60 mL/min/1.73 m^2 Final     Comment:     Calculation used to obtain the estimated glomerular filtration  rate (eGFR) is the CKD-EPI equation.        It should be noted she has been taking OTC decongestant with her mucinex and antihistamine.  Objective:      Physical Exam   Constitutional: She is oriented to person, place, and time. She appears well-developed and well-nourished. She is cooperative.   HENT:   Head: Normocephalic and atraumatic.   Right Ear: Hearing, external ear and ear canal normal.   Left Ear: Hearing, tympanic membrane and external ear normal.   Nose: Nose normal.   Mouth/Throat: Uvula is midline and oropharynx is clear and moist.   Eyes: Lids are normal.   Neck: Trachea normal, normal range of motion, full passive range of motion without pain and phonation normal. Neck supple.   Cardiovascular: Normal rate and regular rhythm.   Pulmonary/Chest: Effort normal and breath sounds normal.   Abdominal: Soft. Bowel sounds are normal.   Musculoskeletal: Normal range of motion.   Lymphadenopathy:        Head (right side): No submental, no submandibular, no tonsillar, no preauricular, no posterior auricular and no occipital adenopathy present.        Head (left side): No submental, no submandibular, no tonsillar, no preauricular, no posterior auricular and no occipital adenopathy present.     She has no cervical adenopathy.   Neurological: She is alert and oriented to person, place, and time.   Skin: Skin is warm, dry and intact.   Psychiatric: She has a normal mood and affect. Her  speech is normal and behavior is normal. Judgment and thought content normal. Cognition and memory are normal.   Nursing note and vitals reviewed.      Assessment & Plan:       Elevated blood pressure reading in office without diagnosis of hypertension- Continue monitoring at home, B/P log given, Avoid OT decongesants    Primary insomnia         Medication List           Accurate as of 9/10/18 10:32 PM. If you have any questions, ask your nurse or doctor.               CONTINUE taking these medications    anastrozole 1 mg Tab  Commonly known as:  ARIMIDEX  Take 1 tablet (1 mg total) by mouth once daily.     aspirin 81 MG EC tablet  Commonly known as:  ECOTRIN     CALCIUM 500 + D 500 mg(1,250mg) -200 unit per tablet  Generic drug:  calcium-vitamin D3     clonazePAM 0.5 MG tablet  Commonly known as:  KLONOPIN  Take 1 tablet (0.5 mg total) by mouth daily as needed for Anxiety.     fish oil-omega-3 fatty acids 300-1,000 mg capsule     FLONASE NASL     Lactobacillus rhamnosus GG 10 billion cell capsule  Commonly known as:  CULTURELLE     levothyroxine 88 MCG tablet  Commonly known as:  SYNTHROID  Take 1 tablet (88 mcg total) by mouth once daily.     loratadine 10 mg tablet  Commonly known as:  CLARITIN     PROLIA 60 mg/mL Syrg  Generic drug:  denosumab     VITAMIN D3 2,000 unit Cap  Generic drug:  cholecalciferol (vitamin D3)        Coricidin HBP for cold symptoms  Claritin (Loratadine)  without the D for sinus drainage or symptoms  Saline Nasal Spray in Shower      Follow-up in about 4 weeks (around 10/8/2018), or if symptoms worsen or fail to improve.

## 2018-09-10 NOTE — PATIENT INSTRUCTIONS
Coricidin HBP for cold symptoms  Claritin (Loratadine)  without the D for sinus drainage or symptoms  Saline Nasal [place] in Shower    Discharge Instructions: Taking Your Blood Pressure  Blood pressure is the force of blood as it moves from the heart through the blood vessels. You can take your own blood pressure reading using a digital monitor. Take readings as often as your healthcare provider instructs. Take your readings each time in the same way, using the same arm. Here are guidelines for taking your blood pressure.  The American Heart Association (AHA) recommends purchasing a blood pressure monitor that is validated and approved by the Association for the Advancement of Medical Instrumentation, the [place] Hypertension Society, and the International Protocol for the Validation of Automated BP Measuring Devices. If the blood pressure monitor is for a senior adult, a pregnant woman, or a child, make certain it is validated for use with such a population. For the most reliable readings, the AHA recommends an automatic, cuff-style, upper arm (bicep) monitor. The readings from finger and wrist monitors are not as reliable as the upper arm monitor.        Step 1. Relax    · Wait at least a half hour after smoking, eating, or exercising. Do not drink coffee, tea, soda, or other caffeinated beverages before checking your blood pressure.   · Sit comfortably at a table. Place the monitor near you.  · Rest for a few minutes before you begin.        Step 2. Wrap the cuff    · Place your arm on the table, palm up. Put your arm in a position that is level with your heart. Wrap the cuff around your upper arm, about an inch above your elbow. Its best to wrap the cuff on bare skin, not over clothing.  · Make sure your cuff fits. If it doesnt wrap around your upper arm, order a larger cuff. A cuff that is too large or too small can result in an inaccurate blood pressure reading.           Step 3. Inflate the cuff    · Pump  the cuff until the scale reads 200. If you have a self-inflating cuff, push the button that starts the pump.  · The cuff will tighten, then loosen.  · The numbers will change. When they stop changing, your blood pressure reading will appear.  · If you get a reading that is too high or too low for you, relax for a few minutes. Then do the test again.    Step 4. Write down the results  · Write down your blood pressure numbers. Bret the date and time. Keep your results in one place, such as a notebook.  · Remove the cuff from your arm. Turn off the machine.  · Take the readings with you to your medical appointments.  · If you start a new blood pressure medicine, or change a blood pressure medicine dose, note the day you started the new drug or dosage on your blood pressure recording sheet. This will help your healthcare provider monitor the effect of medication changes.     Date Last Reviewed: 4/27/2016 © 2000-2016 [employer]. [address] [place] [address]. All rights reserved. This information is not intended as a substitute for professional medical care. Always follow your healthcare professional's instructions.        Low-Salt Diet  This diet removes foods that are high in salt. It also limits the amount of salt you use when cooking. It is most often used for people with high blood pressure, edema (fluid retention), and kidney, liver, or heart disease.  Table salt contains the mineral sodium. Your body needs sodium to work normally. But too much sodium can make your health problems worse. Your healthcare provider is recommending a low-salt (also called low-sodium) diet for you. Your total daily allowance of salt is 1,500 to 2,300 milligrams (mg). It is less than 1 teaspoon of table salt. This means you can have only about 500 to 700 mg of sodium at each meal. People with certain health problems should limit salt intake to the lower end of the recommended range.    When you cook, dont add  much salt. If you can cook without using salt, even better. Dont add salt to your food at the table.  When shopping, read food labels. Salt is often called sodium on the label. Choose foods that are salt-free, low salt, or very low salt. Note that foods with reduced salt may not lower your salt intake enough.    Beans, potatoes, and pasta  Ok: Dry beans, split peas, lentils, potatoes, rice, macaroni, pasta, spaghetti without added salt  Avoid: Potato chips, tortilla chips, and similar products  Breads and cereals  Ok: Low-sodium breads, rolls, cereals, and cakes; low-salt crackers, matzo crackers  Avoid: Salted crackers, pretzels, popcorn, Nepali toast, pancakes, muffins  Dairy  Ok: Milk, chocolate milk, hot chocolate mix, low-salt cheeses, and yogurt  Avoid: Processed cheese and cheese spreads; Roquefort, Camembert, and cottage cheese; buttermilk, instant breakfast drink  Desserts  Ok: Ice cream, frozen yogurt, juice bars, gelatin, cookies and pies, sugar, honey, jelly, hard candy  Avoid: Most pies, cakes and cookies prepared or processed with salt; instant pudding  Drinks  Ok: Tea, coffee, fizzy (carbonated) drinks, juices  Avoid: Flavored coffees, electrolyte replacement drinks, sports drinks  Meats  Ok: All fresh meat, fish, poultry, low-salt tuna, eggs, egg substitute  Avoid: Smoked, pickled, brine-cured, or salted meats and fish. This includes gagnon, chipped beef, corned beef, hot dogs, deli meats, ham, kosher meats, salt pork, sausage, canned tuna, salted codfish, smoked salmon, herring, sardines, or anchovies.  Seasonings and spices  Ok: Most seasonings are okay. Good substitutes for salt include: fresh herb blends, hot sauce, lemon, garlic, young, vinegar, dry mustard, parsley, cilantro, horseradish, tomato paste, regular margarine, mayonnaise, unsalted butter, cream cheese, vegetable oil, cream, low-salt salad dressing and gravy.  Avoid: Regular ketchup, relishes, pickles, soy sauce, teriyaki sauce,  [facility] sauce, BBQ sauce, tartar sauce, meat tenderizer, chili sauce, regular gravy, regular salad dressing, salted butter  Soups  Ok: Low-salt soups and broths made with allowed foods  Avoid: Bouillon cubes, soups with smoked or salted meats, regular soup and broth  Vegetables  Ok: Most vegetables are okay; also low-salt tomato and vegetable juices  Avoid: Sauerkraut and other brine-soaked vegetables; pickles and other pickled vegetables; tomato juice, olives  Date Last Reviewed: 8/1/2016  © 2110-3949 [employer]. [address] [place] [address], [place] [address]. All rights reserved. This information is not intended as a substitute for professional medical care. Always follow your healthcare professional's instructions.

## 2018-09-14 ENCOUNTER — INFUSION (OUTPATIENT)
Dept: INFUSION THERAPY | Facility: HOSPITAL | Age: 73
End: 2018-09-14
Attending: NURSE PRACTITIONER
Payer: MEDICARE

## 2018-09-14 ENCOUNTER — OFFICE VISIT (OUTPATIENT)
Dept: HEMATOLOGY/ONCOLOGY | Facility: CLINIC | Age: 73
End: 2018-09-14
Payer: MEDICARE

## 2018-09-14 VITALS
SYSTOLIC BLOOD PRESSURE: 160 MMHG | HEIGHT: 66 IN | BODY MASS INDEX: 26.26 KG/M2 | TEMPERATURE: 98 F | DIASTOLIC BLOOD PRESSURE: 72 MMHG | HEART RATE: 57 BPM | WEIGHT: 163.38 LBS | RESPIRATION RATE: 18 BRPM

## 2018-09-14 DIAGNOSIS — M81.0 SENILE OSTEOPOROSIS: ICD-10-CM

## 2018-09-14 DIAGNOSIS — C50.911 MALIGNANT NEOPLASM OF RIGHT BREAST IN FEMALE, ESTROGEN RECEPTOR POSITIVE, UNSPECIFIED SITE OF BREAST: Primary | ICD-10-CM

## 2018-09-14 DIAGNOSIS — M81.0 SENILE OSTEOPOROSIS: Primary | ICD-10-CM

## 2018-09-14 DIAGNOSIS — Z17.0 MALIGNANT NEOPLASM OF RIGHT BREAST IN FEMALE, ESTROGEN RECEPTOR POSITIVE, UNSPECIFIED SITE OF BREAST: Primary | ICD-10-CM

## 2018-09-14 PROCEDURE — 99999 PR PBB SHADOW E&M-EST. PATIENT-LVL III: CPT | Mod: PBBFAC,,, | Performed by: NURSE PRACTITIONER

## 2018-09-14 PROCEDURE — 96372 THER/PROPH/DIAG INJ SC/IM: CPT | Mod: PN

## 2018-09-14 PROCEDURE — 99213 OFFICE O/P EST LOW 20 MIN: CPT | Mod: PBBFAC,PN | Performed by: NURSE PRACTITIONER

## 2018-09-14 PROCEDURE — 63600175 PHARM REV CODE 636 W HCPCS: Mod: TB,PN | Performed by: INTERNAL MEDICINE

## 2018-09-14 PROCEDURE — 99214 OFFICE O/P EST MOD 30 MIN: CPT | Mod: S$PBB,,, | Performed by: NURSE PRACTITIONER

## 2018-09-14 RX ADMIN — DENOSUMAB 60 MG: 60 INJECTION SUBCUTANEOUS at 10:09

## 2018-09-14 NOTE — PROGRESS NOTES
HISTORY OF PRESENT ILLNESS:  The patient is a 72-year-old white female   known to Dr. Ricardo for Stage I (T1b, N0, M0) papillary right breast carcinoma.    Tumor was found to be ER/CT positive & [id number] negative.  She is status   post right mastectomy (11/16/17) for residual disease per Dr. Starr.    Oncotype DX did not have enough tissue for analysis.  She was started on   Arimidex on her last visit (03/2018).  She received her 1st Prolia injection on   03/14/2018.  She returns to clinic today for her 6 month post evaluation with   no complaints.  She denies any difficulties with new breast concerns, chest   pain, nipple discharge, unbearable hot flashes, fevers, chills, abdominal   discomfort, nausea, vomiting, diarrhea, bleeding, etc.   She reports working   out with 8# weights daily.  No new complaints or pertinent findings on a 14-point   review of systems.    PHYSICAL EXAMINATION:  GENERAL:  Well-developed, well-nourished elderly white female in no acute   distress.  Alert & oriented x 3.  VITAL SIGNS:  Weight:  stable  Wt Readings from Last 3 Encounters:   09/14/18 74.1 kg (163 lb 5.8 oz)   09/10/18 75.3 kg (166 lb 0.1 oz)   08/09/18 74.4 kg (164 lb 0.4 oz)     Temp Readings from Last 3 Encounters:   09/14/18 98.4 °F (36.9 °C)   09/10/18 98.2 °F (36.8 °C)   06/13/18 98.1 °F (36.7 °C)     BP Readings from Last 3 Encounters:   09/14/18 (!) 160/72   09/10/18 (!) 150/84   08/09/18 (!) 144/82     Pulse Readings from Last 3 Encounters:   09/14/18 (!) 57   09/10/18 62   08/09/18 (!) 52     HEENT:  Normocephalic, atraumatic.  Oral mucosa pink and moist.  Lips without   lesions.  Tongue midline.  Oropharynx clear.  Nonicteric sclerae.   NECK:  Supple, no adenopathy.  No carotid bruits, thyromegaly or thyroid nodule.  HEART:  Regular rate and rhythm without murmur, gallop or rub.                LUNGS:  Clear to auscultation bilaterally.  Normal respiratory effort.       ABDOMEN:  Soft, nontender, nondistended with  positive normoactive bowel sounds,   no hepatosplenomegaly.    EXTREMITIES:  No cyanosis, clubbing or edema.  Distal pulses are intact.  AXILLAE AND GROIN:  No palpable pathologic lymphadenopathy is appreciated.        SKIN:  Intact/turgor normal.  NEUROLOGIC:  Cranial nerves II-XII grossly intact.  Motor:  Good muscle bulk and   tone.  Strength/sensory 5/5 throughout.  Gait stable.  BREASTS:  Right breast mastectomy with no signs of reoccurrence about the chest wall.  Left breast soft, free of masses, nipple discharge or inversion.      Laboratory:    BMP  Lab Results   Component Value Date     09/14/2018    K 5.0 09/14/2018     09/14/2018    CO2 28 09/14/2018    BUN 19 (H) 09/14/2018    CREATININE 0.73 09/14/2018    CALCIUM 9.8 09/14/2018    ANIONGAP 7 (L) 09/14/2018    ESTGFRAFRICA >60 09/14/2018    EGFRNONAA >60 09/14/2018     Vitamin D: 60    IMPRESSION:  1.  Stage I papillary right breast carcinoma - s/p right mastectomy 11/16/2017  2.  Senile osteoporosis - last BMD 12/11/2017    PLAN:  1.  Continue Arimidex 1 mg daily with a planned duration of 60 months; started 03/2018.  2.  Continue calcium supplementation with vitamin D.  3.  Proceed with Prolia 60 mg SQ today.  4.  Notify the clinic for any need of invasive dental work.  5.  Return to clinic in 3 months for 9 month post evaluation without interval labs or imaging.      Assessment/plan reviewed and approved by Dr. Du.

## 2018-10-15 ENCOUNTER — OFFICE VISIT (OUTPATIENT)
Dept: PRIMARY CARE CLINIC | Facility: CLINIC | Age: 73
End: 2018-10-15
Payer: MEDICARE

## 2018-10-15 VITALS
DIASTOLIC BLOOD PRESSURE: 72 MMHG | HEART RATE: 63 BPM | HEIGHT: 66 IN | OXYGEN SATURATION: 98 % | TEMPERATURE: 98 F | WEIGHT: 164.25 LBS | SYSTOLIC BLOOD PRESSURE: 136 MMHG | BODY MASS INDEX: 26.4 KG/M2

## 2018-10-15 DIAGNOSIS — E03.9 ACQUIRED HYPOTHYROIDISM: ICD-10-CM

## 2018-10-15 DIAGNOSIS — R03.0 ELEVATED BLOOD PRESSURE READING IN OFFICE WITHOUT DIAGNOSIS OF HYPERTENSION: Primary | ICD-10-CM

## 2018-10-15 DIAGNOSIS — E78.00 PURE HYPERCHOLESTEROLEMIA: ICD-10-CM

## 2018-10-15 PROCEDURE — 99213 OFFICE O/P EST LOW 20 MIN: CPT | Mod: S$GLB,,, | Performed by: NURSE PRACTITIONER

## 2018-10-15 NOTE — PROGRESS NOTES
Subjective:       Patient ID: Sarahy Coronel is a 73 y.o. female.    Chief Complaint: Hypertension  She was last seen in primary care by me on 09/10/2018, She last saw Jero on 08/09//2018.   HPI   She is here today to follow up with blood pressure.  Vitals:    10/15/18 1307   BP: 136/72   Pulse: 63   Temp: 98 °F (36.7 °C)     BP Readings from Last 3 Encounters:   10/15/18 136/72   09/14/18 (!) 160/72   09/10/18 (!) 150/84     Lab Results   Component Value Date    TSH 0.863 08/09/2018                           Review of Systems    States working out 1.5 hrs 3 times weekly (treadmill and bicycle and some weights).  She is on Arimedex and the profile does note that it can cause some HTN  Objective:      Physical Exam   Constitutional: She is oriented to person, place, and time. Vital signs are normal. She appears well-developed and well-nourished. She is active and cooperative.   HENT:   Head: Normocephalic and atraumatic.   Right Ear: Hearing, tympanic membrane, external ear and ear canal normal.   Left Ear: Hearing, tympanic membrane, external ear and ear canal normal.   Nose: Nose normal.   Mouth/Throat: Uvula is midline, oropharynx is clear and moist and mucous membranes are normal.   Eyes: Lids are normal.   Neck: Trachea normal, normal range of motion, full passive range of motion without pain and phonation normal. Neck supple.   Cardiovascular: Normal rate and regular rhythm.   Pulmonary/Chest: Effort normal and breath sounds normal.   Abdominal: Soft. Bowel sounds are normal.   Musculoskeletal: Normal range of motion.   Lymphadenopathy:        Head (right side): No submental, no submandibular, no tonsillar, no preauricular, no posterior auricular and no occipital adenopathy present.        Head (left side): No submental, no submandibular, no tonsillar, no preauricular, no posterior auricular and no occipital adenopathy present.     She has no cervical adenopathy.   Neurological: She is alert and oriented to  person, place, and time.   Skin: Skin is warm, dry and intact.   Psychiatric: She has a normal mood and affect. Her speech is normal and behavior is normal. Judgment and thought content normal. Cognition and memory are normal.   Nursing note and vitals reviewed.      Assessment & Plan:       Elevated blood pressure reading in office without diagnosis of hypertension- Stable, will continue monitoring readings at home three times weekly. Will not add agent today because of concerns about hypostatic HTN and possible injury or fall.     Pure hypercholesterolemia  -     Lipid panel; Future; Expected date: 10/15/2018    Acquired hypothyroidism    She will get her labs for lipids next month when in [place] for another a]appointment.     Medication List           Accurate as of 10/15/18  2:27 PM. If you have any questions, ask your nurse or doctor.               CONTINUE taking these medications    anastrozole 1 mg Tab  Commonly known as:  ARIMIDEX  Take 1 tablet (1 mg total) by mouth once daily.     aspirin 81 MG EC tablet  Commonly known as:  ECOTRIN     CALCIUM 500 + D 500 mg(1,250mg) -200 unit per tablet  Generic drug:  calcium-vitamin D3     clonazePAM 0.5 MG tablet  Commonly known as:  KLONOPIN  Take 1 tablet (0.5 mg total) by mouth daily as needed for Anxiety.     fish oil-omega-3 fatty acids 300-1,000 mg capsule     FLONASE NASL     FLUZONE HIGH-DOSE 2018-19 (PF) 180 mcg/0.5 mL vaccine  Generic drug:  influenza     Lactobacillus rhamnosus GG 10 billion cell capsule  Commonly known as:  CULTURELLE     levothyroxine 88 MCG tablet  Commonly known as:  SYNTHROID  Take 1 tablet (88 mcg total) by mouth once daily.     loratadine 10 mg tablet  Commonly known as:  CLARITIN     PROLIA 60 mg/mL Syrg  Generic drug:  denosumab     VITAMIN D3 2,000 unit Cap  Generic drug:  cholecalciferol (vitamin D3)              No Follow-up on file.

## 2018-10-15 NOTE — PATIENT INSTRUCTIONS
Continue blood pressure readings 3 times weekly  Drink plenty of fluid  Discharge Instructions: Taking Your Blood Pressure  Blood pressure is the force of blood as it moves from the heart through the blood vessels. You can take your own blood pressure reading using a digital monitor. Take readings as often as your healthcare provider instructs. Take your readings each time in the same way, using the same arm. Here are guidelines for taking your blood pressure.  The American Heart Association (AHA) recommends purchasing a blood pressure monitor that is validated and approved by the Association for the Advancement of Medical Instrumentation, the [place] Hypertension Society, and the International Protocol for the Validation of Automated BP Measuring Devices. If the blood pressure monitor is for a senior adult, a pregnant woman, or a child, make certain it is validated for use with such a population. For the most reliable readings, the AHA recommends an automatic, cuff-style, upper arm (bicep) monitor. The readings from finger and wrist monitors are not as reliable as the upper arm monitor.        Step 1. Relax    · Wait at least a half hour after smoking, eating, or exercising. Do not drink coffee, tea, soda, or other caffeinated beverages before checking your blood pressure.   · Sit comfortably at a table. Place the monitor near you.  · Rest for a few minutes before you begin.        Step 2. Wrap the cuff    · Place your arm on the table, palm up. Put your arm in a position that is level with your heart. Wrap the cuff around your upper arm, about an inch above your elbow. Its best to wrap the cuff on bare skin, not over clothing.  · Make sure your cuff fits. If it doesnt wrap around your upper arm, order a larger cuff.  A cuff that is too large or too small can result in an inaccurate blood pressure reading.           Step 3. Inflate the cuff    · Pump the cuff until the scale reads 200. If you have a self-inflating cuff, push the button that starts the pump.  · The cuff will tighten, then loosen.  · The numbers will change. When they stop changing, your blood pressure reading will appear.  · If you get a reading that is too high or too low for you, relax for a few minutes. Then do the test again.    Step 4. Write down the results  · Write down your blood pressure numbers. Bret the date and time. Keep your results in one place, such as a notebook.  · Remove the cuff from your arm. Turn off the machine.  · Take the readings with you to your medical appointments.  · If you start a new blood pressure medicine, or change a blood pressure medicine dose, note the day you started the new drug or dosage on your blood pressure recording sheet. This will help your healthcare provider monitor the effect of medication changes.     Date Last Reviewed: 4/27/2016 © 2000-2016 [employer]. [address] [place] [address], [place] [address]. All rights reserved. This information is not intended as a substitute for professional medical care. Always follow your healthcare professional's instructions.

## 2018-10-18 ENCOUNTER — TELEPHONE (OUTPATIENT)
Dept: FAMILY MEDICINE | Facility: CLINIC | Age: 73
End: 2018-10-18

## 2018-10-18 NOTE — TELEPHONE ENCOUNTER
----- Message from Tayla Marsh sent at 10/18/2018 11:09 AM CDT -----  Type: Needs Medical Advice    Who Called: Patient      Best Call Back Number: [phone] (home)     Additional Information: Patient called regarding being diagnosed with breast cancer last year and received a notice time for annual mammogram. Stating she had a mammogram and ultrasound completed in April. Wanted to know if that would suffice or if another one is needed. Please advise

## 2018-10-18 NOTE — TELEPHONE ENCOUNTER
Spoke to patient. Patient will contact the mammogram dept to get her mammogram scheduled if needed.

## 2018-11-03 RX ORDER — LEVOTHYROXINE SODIUM 88 UG/1
TABLET ORAL
Qty: 90 TABLET | Refills: 3 | Status: SHIPPED | OUTPATIENT
Start: 2018-11-03 | End: 2019-08-19 | Stop reason: SDUPTHER

## 2018-11-06 ENCOUNTER — TELEPHONE (OUTPATIENT)
Dept: FAMILY MEDICINE | Facility: CLINIC | Age: 73
End: 2018-11-06

## 2018-11-06 NOTE — TELEPHONE ENCOUNTER
----- Message from Christ Zheng sent at 11/5/2018  4:32 PM CST -----  Contact: same  Type:  Test Results    Who Called:  patent  Name of Test (Lab/Mammo/Etc):  labs  Date of Test:  10/30/18  Ordering Provider:  Gregory  Where the test was performed:  [address]  Best Call Back Number:  [phone]  Additional Information:  n/a

## 2018-11-07 NOTE — TELEPHONE ENCOUNTER
Spoke to pt about results. Pt verbalized understanding.  States she is exercising daily and will continue to do so.

## 2018-11-07 NOTE — TELEPHONE ENCOUNTER
Please inform the following messaging sent in portal:   Your total cholesterol has improved slightly from 214 to 209. All other parts of cholesterol panel were within normal limits. Continue low saturated fat diet and daily walking for 20-30 minutes would be helpful.

## 2018-12-14 ENCOUNTER — OFFICE VISIT (OUTPATIENT)
Dept: HEMATOLOGY/ONCOLOGY | Facility: CLINIC | Age: 73
End: 2018-12-14
Payer: MEDICARE

## 2018-12-14 VITALS
DIASTOLIC BLOOD PRESSURE: 78 MMHG | HEIGHT: 66 IN | BODY MASS INDEX: 26.01 KG/M2 | SYSTOLIC BLOOD PRESSURE: 142 MMHG | RESPIRATION RATE: 16 BRPM | WEIGHT: 161.81 LBS | TEMPERATURE: 98 F | HEART RATE: 59 BPM

## 2018-12-14 DIAGNOSIS — M81.0 OSTEOPOROSIS, SENILE: ICD-10-CM

## 2018-12-14 DIAGNOSIS — M89.9 DISORDER OF BONE AND CARTILAGE: ICD-10-CM

## 2018-12-14 DIAGNOSIS — M94.9 DISORDER OF BONE AND CARTILAGE: ICD-10-CM

## 2018-12-14 DIAGNOSIS — M81.0 SENILE OSTEOPOROSIS: ICD-10-CM

## 2018-12-14 DIAGNOSIS — C50.011 MALIGNANT NEOPLASM OF NIPPLE OF RIGHT BREAST IN FEMALE, UNSPECIFIED ESTROGEN RECEPTOR STATUS: ICD-10-CM

## 2018-12-14 DIAGNOSIS — C50.911 MALIGNANT NEOPLASM OF RIGHT BREAST IN FEMALE, ESTROGEN RECEPTOR POSITIVE, UNSPECIFIED SITE OF BREAST: Primary | ICD-10-CM

## 2018-12-14 DIAGNOSIS — Z17.0 MALIGNANT NEOPLASM OF RIGHT BREAST IN FEMALE, ESTROGEN RECEPTOR POSITIVE, UNSPECIFIED SITE OF BREAST: Primary | ICD-10-CM

## 2018-12-14 PROCEDURE — 99214 OFFICE O/P EST MOD 30 MIN: CPT | Mod: PBBFAC,PN | Performed by: NURSE PRACTITIONER

## 2018-12-14 PROCEDURE — 99213 OFFICE O/P EST LOW 20 MIN: CPT | Mod: S$PBB,,, | Performed by: NURSE PRACTITIONER

## 2018-12-14 PROCEDURE — 99999 PR PBB SHADOW E&M-EST. PATIENT-LVL IV: CPT | Mod: PBBFAC,,, | Performed by: NURSE PRACTITIONER

## 2018-12-14 RX ORDER — ANASTROZOLE 1 MG/1
1 TABLET ORAL DAILY
Qty: 90 TABLET | Refills: 3 | Status: SHIPPED | OUTPATIENT
Start: 2018-12-14 | End: 2020-01-24 | Stop reason: SDUPTHER

## 2018-12-14 NOTE — PROGRESS NOTES
HISTORY OF PRESENT ILLNESS:  The patient is a 73-year-old white female   known to Dr. Ricardo for Stage I (T1b, N0, M0) papillary right breast carcinoma.    Tumor was found to be ER/WI positive & Cef4qzi negative.  She is status   post right mastectomy (11/16/17) for residual disease per Dr. Starr.    Oncotype DX did not have enough tissue for analysis.  She was started on   Arimidex on her last visit (03/2018).  She received her 1st Prolia injection on   03/14/2018.      She returns to clinic today for her 9 month post evaluation with no complaints.    She has mild and few hot flashes.  She denies any difficulties with new breast   concerns, chest pain, nipple discharge, unbearable hot flashes, fevers, chills,   abdominal discomfort, nausea, vomiting, diarrhea, bleeding, etc.   She continues   to work out daily.  No new complaints or pertinent findings on a 14-point   review of systems.    PHYSICAL EXAMINATION:  GENERAL:  Well-developed, well-nourished elderly white female in no acute   distress.  Alert & oriented x 3.  VITAL SIGNS:  Weight:  Loss of 2 pounds in 3 months  Wt Readings from Last 3 Encounters:   12/14/18 73.4 kg (161 lb 13.1 oz)   10/15/18 74.5 kg (164 lb 3.9 oz)   09/14/18 74.1 kg (163 lb 5.8 oz)     Temp Readings from Last 3 Encounters:   12/14/18 98.3 °F (36.8 °C)   10/15/18 98 °F (36.7 °C)   09/14/18 98.4 °F (36.9 °C)     BP Readings from Last 3 Encounters:   12/14/18 (!) 142/78   10/15/18 136/72   09/14/18 (!) 160/72     Pulse Readings from Last 3 Encounters:   12/14/18 (!) 59   10/15/18 63   09/14/18 (!) 57     HEENT:  Normocephalic, atraumatic.  Oral mucosa pink and moist.  Lips without   lesions.  Tongue midline.  Oropharynx clear.  Nonicteric sclerae.   NECK:  Supple, no adenopathy.  No carotid bruits, thyromegaly or thyroid nodule.  HEART:  Regular rate and rhythm without murmur, gallop or rub.                LUNGS:  Clear to auscultation bilaterally.  Normal respiratory effort.        ABDOMEN:  Soft, nontender, nondistended with positive normoactive bowel sounds,   no hepatosplenomegaly.    EXTREMITIES:  No cyanosis, clubbing or edema.  Distal pulses are intact.  AXILLAE AND GROIN:  No palpable pathologic lymphadenopathy is appreciated.        SKIN:  Intact/turgor normal.  NEUROLOGIC:  Cranial nerves II-XII grossly intact.  Motor:  Good muscle bulk and   tone.  Strength/sensory 5/5 throughout.  Gait stable.  BREASTS:  Right breast mastectomy with no signs of reoccurrence about the chest wall.  Left breast soft, free of masses, nipple discharge or inversion.      No labs or imaging.    IMPRESSION:  1.  Stage I papillary right breast carcinoma - s/p right mastectomy 11/16/2017  2.  Senile osteoporosis - last BMD 12/11/2017    PLAN:  1.  Continue Arimidex 1 mg daily with a planned duration of 60 months; started 03/2018.  2.  Continue calcium supplementation with vitamin D.  3.  Notify the clinic for any need of invasive dental work.  4.  Return to clinic in 3 months for 12 month post evaluation/next Prolia with interval   CBC, CMP, LDH, Vitamin D prior.    To note:  Mammogram due in 04/2019.    Assessment/plan reviewed and approved by Dr. Du.

## 2019-02-14 ENCOUNTER — OFFICE VISIT (OUTPATIENT)
Dept: FAMILY MEDICINE | Facility: CLINIC | Age: 74
End: 2019-02-14
Payer: MEDICARE

## 2019-02-14 VITALS
SYSTOLIC BLOOD PRESSURE: 126 MMHG | BODY MASS INDEX: 25.66 KG/M2 | HEART RATE: 68 BPM | RESPIRATION RATE: 18 BRPM | DIASTOLIC BLOOD PRESSURE: 74 MMHG | WEIGHT: 159.63 LBS | HEIGHT: 66 IN

## 2019-02-14 DIAGNOSIS — E03.9 ACQUIRED HYPOTHYROIDISM: ICD-10-CM

## 2019-02-14 DIAGNOSIS — E78.00 PURE HYPERCHOLESTEROLEMIA: Primary | ICD-10-CM

## 2019-02-14 PROCEDURE — 99214 PR OFFICE/OUTPT VISIT, EST, LEVL IV, 30-39 MIN: ICD-10-PCS | Mod: S$PBB,,, | Performed by: FAMILY MEDICINE

## 2019-02-14 PROCEDURE — 99999 PR PBB SHADOW E&M-EST. PATIENT-LVL III: ICD-10-PCS | Mod: PBBFAC,,, | Performed by: FAMILY MEDICINE

## 2019-02-14 PROCEDURE — 99214 OFFICE O/P EST MOD 30 MIN: CPT | Mod: S$PBB,,, | Performed by: FAMILY MEDICINE

## 2019-02-14 PROCEDURE — 99999 PR PBB SHADOW E&M-EST. PATIENT-LVL III: CPT | Mod: PBBFAC,,, | Performed by: FAMILY MEDICINE

## 2019-02-14 PROCEDURE — 99213 OFFICE O/P EST LOW 20 MIN: CPT | Mod: PBBFAC,PO | Performed by: FAMILY MEDICINE

## 2019-02-14 NOTE — PROGRESS NOTES
Subjective:       Patient ID: Sarahy Coronel is a 73 y.o. female.    Chief Complaint: Hypertension and Hyperlipidemia    Here for f/u htn and lipids. Doing well overall.       Hypertension   This is a chronic problem. The current episode started more than 1 year ago. The problem is controlled. Pertinent negatives include no chest pain, palpitations or shortness of breath.   Hyperlipidemia   This is a chronic problem. The current episode started more than 1 year ago. Pertinent negatives include no chest pain or shortness of breath.     Review of Systems   Constitutional: Negative for chills, fatigue and fever.   Respiratory: Negative for cough, chest tightness and shortness of breath.    Cardiovascular: Negative for chest pain, palpitations and leg swelling.   Endocrine: Negative for cold intolerance and heat intolerance.   Skin: Negative for rash.   Psychiatric/Behavioral: Negative for dysphoric mood. The patient is not nervous/anxious.        Objective:      Physical Exam   Constitutional: She appears well-developed and well-nourished.   HENT:   Head: Normocephalic and atraumatic.   Cardiovascular: Normal rate, regular rhythm and normal heart sounds.   Pulmonary/Chest: Effort normal and breath sounds normal.   Psychiatric: She has a normal mood and affect.   Nursing note and vitals reviewed.      Assessment:       1. Pure hypercholesterolemia    2. Acquired hypothyroidism        Plan:       Pure hypercholesterolemia    Acquired hypothyroidism      Doing well overall. F/u with heme/onc as planned and get labs.  Will monitor chronic medical issues and continue current plan of care.      Follow-up in about 6 months (around 8/14/2019), or if symptoms worsen or fail to improve.

## 2019-03-15 ENCOUNTER — INFUSION (OUTPATIENT)
Dept: INFUSION THERAPY | Facility: HOSPITAL | Age: 74
End: 2019-03-15
Attending: NURSE PRACTITIONER
Payer: MEDICARE

## 2019-03-15 ENCOUNTER — OFFICE VISIT (OUTPATIENT)
Dept: HEMATOLOGY/ONCOLOGY | Facility: CLINIC | Age: 74
End: 2019-03-15
Payer: MEDICARE

## 2019-03-15 ENCOUNTER — HOSPITAL ENCOUNTER (OUTPATIENT)
Dept: RADIOLOGY | Facility: HOSPITAL | Age: 74
Discharge: HOME | End: 2019-03-15
Attending: NURSE PRACTITIONER
Payer: MEDICARE

## 2019-03-15 VITALS
HEART RATE: 55 BPM | RESPIRATION RATE: 18 BRPM | TEMPERATURE: 98 F | DIASTOLIC BLOOD PRESSURE: 77 MMHG | SYSTOLIC BLOOD PRESSURE: 156 MMHG

## 2019-03-15 VITALS
HEART RATE: 55 BPM | DIASTOLIC BLOOD PRESSURE: 77 MMHG | WEIGHT: 160.94 LBS | BODY MASS INDEX: 25.86 KG/M2 | HEIGHT: 66 IN | RESPIRATION RATE: 18 BRPM | TEMPERATURE: 98 F | SYSTOLIC BLOOD PRESSURE: 156 MMHG

## 2019-03-15 DIAGNOSIS — M89.9 DISORDER OF BONE AND CARTILAGE: ICD-10-CM

## 2019-03-15 DIAGNOSIS — M81.0 SENILE OSTEOPOROSIS: Primary | ICD-10-CM

## 2019-03-15 DIAGNOSIS — C50.911 MALIGNANT NEOPLASM OF RIGHT BREAST IN FEMALE, ESTROGEN RECEPTOR POSITIVE, UNSPECIFIED SITE OF BREAST: ICD-10-CM

## 2019-03-15 DIAGNOSIS — M81.0 SENILE OSTEOPOROSIS: ICD-10-CM

## 2019-03-15 DIAGNOSIS — C50.911 MALIGNANT NEOPLASM OF RIGHT BREAST IN FEMALE, ESTROGEN RECEPTOR POSITIVE, UNSPECIFIED SITE OF BREAST: Primary | ICD-10-CM

## 2019-03-15 DIAGNOSIS — Z17.0 MALIGNANT NEOPLASM OF RIGHT BREAST IN FEMALE, ESTROGEN RECEPTOR POSITIVE, UNSPECIFIED SITE OF BREAST: ICD-10-CM

## 2019-03-15 DIAGNOSIS — M94.9 DISORDER OF BONE AND CARTILAGE: ICD-10-CM

## 2019-03-15 DIAGNOSIS — N64.4 BREAST PAIN, LEFT: ICD-10-CM

## 2019-03-15 DIAGNOSIS — Z17.0 MALIGNANT NEOPLASM OF RIGHT BREAST IN FEMALE, ESTROGEN RECEPTOR POSITIVE, UNSPECIFIED SITE OF BREAST: Primary | ICD-10-CM

## 2019-03-15 PROCEDURE — 96372 THER/PROPH/DIAG INJ SC/IM: CPT | Mod: PN

## 2019-03-15 PROCEDURE — 77061 MAMMO DIGITAL DIAGNOSTIC LEFT WITH TOMOSYNTHESIS_CAD: ICD-10-PCS | Mod: 26,LT,, | Performed by: RADIOLOGY

## 2019-03-15 PROCEDURE — 99214 OFFICE O/P EST MOD 30 MIN: CPT | Mod: PBBFAC,PN | Performed by: NURSE PRACTITIONER

## 2019-03-15 PROCEDURE — 99214 PR OFFICE/OUTPT VISIT, EST, LEVL IV, 30-39 MIN: ICD-10-PCS | Mod: S$PBB,,, | Performed by: NURSE PRACTITIONER

## 2019-03-15 PROCEDURE — 77065 DX MAMMO INCL CAD UNI: CPT | Mod: TC,PO,LT

## 2019-03-15 PROCEDURE — 99214 OFFICE O/P EST MOD 30 MIN: CPT | Mod: S$PBB,,, | Performed by: NURSE PRACTITIONER

## 2019-03-15 PROCEDURE — 63600175 PHARM REV CODE 636 W HCPCS: Mod: JG,PN | Performed by: NURSE PRACTITIONER

## 2019-03-15 PROCEDURE — 76642 ULTRASOUND BREAST LIMITED: CPT | Mod: 26,LT,, | Performed by: RADIOLOGY

## 2019-03-15 PROCEDURE — 99999 PR PBB SHADOW E&M-EST. PATIENT-LVL IV: ICD-10-PCS | Mod: PBBFAC,,, | Performed by: NURSE PRACTITIONER

## 2019-03-15 PROCEDURE — 77065 MAMMO DIGITAL DIAGNOSTIC LEFT WITH TOMOSYNTHESIS_CAD: ICD-10-PCS | Mod: 26,LT,, | Performed by: RADIOLOGY

## 2019-03-15 PROCEDURE — 77061 BREAST TOMOSYNTHESIS UNI: CPT | Mod: 26,LT,, | Performed by: RADIOLOGY

## 2019-03-15 PROCEDURE — 77061 BREAST TOMOSYNTHESIS UNI: CPT | Mod: TC,PO,LT

## 2019-03-15 PROCEDURE — 76642 ULTRASOUND BREAST LIMITED: CPT | Mod: TC,PO,LT

## 2019-03-15 PROCEDURE — 77065 DX MAMMO INCL CAD UNI: CPT | Mod: 26,LT,, | Performed by: RADIOLOGY

## 2019-03-15 PROCEDURE — 76642 US BREAST LEFT LIMITED: ICD-10-PCS | Mod: 26,LT,, | Performed by: RADIOLOGY

## 2019-03-15 PROCEDURE — 99999 PR PBB SHADOW E&M-EST. PATIENT-LVL IV: CPT | Mod: PBBFAC,,, | Performed by: NURSE PRACTITIONER

## 2019-03-15 RX ORDER — IBUPROFEN 100 MG/5ML
1000 SUSPENSION, ORAL (FINAL DOSE FORM) ORAL DAILY
COMMUNITY

## 2019-03-15 RX ADMIN — DENOSUMAB 60 MG: 60 INJECTION SUBCUTANEOUS at 11:03

## 2019-03-15 NOTE — PROGRESS NOTES
"HISTORY OF PRESENT ILLNESS:  The patient is a 73-year-old white female   known to Dr. Ricardo for Stage I (T1b, N0, M0) papillary right breast carcinoma.    Tumor was found to be ER/MD positive & Std6zcn negative.  She is status   post right mastectomy (11/16/17) for residual disease per Dr. Starr.    Oncotype DX did not have enough tissue for analysis.  She was started on   Arimidex on her last visit (03/2018).  She received her 1st Prolia injection on   03/14/2018.  The patient also carries the diagnosis of Osteoporosis that   was diagnosed in 2015.    She returns to clinic today for her 12 month post evaluation and next Prolia.    She complains of "electrical" pains in her left breast.  She continues to have   mild night sweats.  She continues to work out 3 x's a week using weights.    She denies any difficulties with chest pain, nipple discharge, unbearable   hot flashes, fevers, chills, abdominal discomfort, nausea, vomiting, diarrhea,   bleeding, etc.   No new complaints or pertinent findings on a 14-point review   of systems.    PHYSICAL EXAMINATION:  GENERAL:  Well-developed, well-nourished elderly white female in no acute   distress.  Alert & oriented x 3.  VITAL SIGNS:  Weight:  stable  Wt Readings from Last 3 Encounters:   02/14/19 72.4 kg (159 lb 9.8 oz)   12/14/18 73.4 kg (161 lb 13.1 oz)   10/15/18 74.5 kg (164 lb 3.9 oz)     Temp Readings from Last 3 Encounters:   12/14/18 98.3 °F (36.8 °C)   10/15/18 98 °F (36.7 °C)   09/14/18 98.4 °F (36.9 °C)     BP Readings from Last 3 Encounters:   02/14/19 126/74   12/14/18 (!) 142/78   10/15/18 136/72     Pulse Readings from Last 3 Encounters:   02/14/19 68   12/14/18 (!) 59   10/15/18 63     HEENT:  Normocephalic, atraumatic.  Oral mucosa pink and moist.  Lips without   lesions.  Tongue midline.  Oropharynx clear.  Nonicteric sclerae.   NECK:  Supple, no adenopathy.  No carotid bruits, thyromegaly or thyroid nodule.  HEART:  Regular rate and rhythm without " murmur, gallop or rub.                LUNGS:  Clear to auscultation bilaterally.  Normal respiratory effort.       ABDOMEN:  Soft, nontender, nondistended with positive normoactive bowel sounds,   no hepatosplenomegaly.    EXTREMITIES:  No cyanosis, clubbing or edema.  Distal pulses are intact.  AXILLAE AND GROIN:  No palpable pathologic lymphadenopathy is appreciated.        SKIN:  Intact/turgor normal.  NEUROLOGIC:  Cranial nerves II-XII grossly intact.  Motor:  Good muscle bulk and   tone.  Strength/sensory 5/5 throughout.  Gait stable.  BREASTS:  Right breast mastectomy with no signs of reoccurrence about the chest wall.  Left breast soft, tender at 3 o'clock position, thick, clumpy to palpation, no nipple discharge   or inversion.      LABORATORY:    Lab Results   Component Value Date    WBC 4.93 03/15/2019    HGB 12.5 03/15/2019    HCT 37.2 03/15/2019    MCV 92 03/15/2019     03/15/2019     Unremarkable differential    CMP  Sodium   Date Value Ref Range Status   03/15/2019 142 136 - 145 mmol/L Final     Potassium   Date Value Ref Range Status   03/15/2019 4.7 3.5 - 5.1 mmol/L Final     Chloride   Date Value Ref Range Status   03/15/2019 107 95 - 110 mmol/L Final     CO2   Date Value Ref Range Status   03/15/2019 28 22 - 31 mmol/L Final     Glucose   Date Value Ref Range Status   03/15/2019 93 70 - 110 mg/dL Final     Comment:     The ADA recommends the following guidelines for fasting glucose:  Normal:       less than 100 mg/dL  Prediabetes:  100 mg/dL to 125 mg/dL  Diabetes:     126 mg/dL or higher       BUN, Bld   Date Value Ref Range Status   03/15/2019 20 (H) 7 - 18 mg/dL Final     Creatinine   Date Value Ref Range Status   03/15/2019 0.74 0.50 - 1.40 mg/dL Final     Calcium   Date Value Ref Range Status   03/15/2019 10.0 8.4 - 10.2 mg/dL Final     Total Protein   Date Value Ref Range Status   03/15/2019 7.4 6.0 - 8.4 g/dL Final     Albumin   Date Value Ref Range Status   03/15/2019 4.3 3.5 - 5.2  g/dL Final     Total Bilirubin   Date Value Ref Range Status   03/15/2019 0.3 0.2 - 1.3 mg/dL Final     Alkaline Phosphatase   Date Value Ref Range Status   03/15/2019 39 38 - 145 U/L Final     AST   Date Value Ref Range Status   03/15/2019 25 14 - 36 U/L Final     ALT   Date Value Ref Range Status   03/15/2019 13 10 - 44 U/L Final     Anion Gap   Date Value Ref Range Status   03/15/2019 7 (L) 8 - 16 mmol/L Final     eGFR if    Date Value Ref Range Status   03/15/2019 >60 >60 mL/min/1.73 m^2 Final     eGFR if non    Date Value Ref Range Status   03/15/2019 >60 >60 mL/min/1.73 m^2 Final     Comment:     Calculation used to obtain the estimated glomerular filtration  rate (eGFR) is the CKD-EPI equation.        LDH:  480  Vitamin D:  60    IMPRESSION:  1.  Stage I papillary right breast carcinoma - s/p right mastectomy 11/16/2017  2.  Senile osteoporosis - last BMD 12/11/2017  3.  Tender left breast - obtain Mammo, left asap    PLAN:  1.  Continue Arimidex 1 mg daily with a planned duration of 60 months; started 03/2018.  2.  Continue calcium supplementation with vitamin D.  3.  Notify the clinic for any need of invasive dental work.  4.  Arrange Mammogram, left ASAP  5.  Proceed with Prolia 60 mg SQ today.  6.  Return to clinic in 6 months for 18 month post evaluation/next Prolia with interval   BMP, Vitamin D, CXR prior.    Assessment/plan reviewed and approved by Dr. Ricardo.

## 2019-03-15 NOTE — PLAN OF CARE
Problem: Adult Inpatient Plan of Care  Goal: Plan of Care Review  Outcome: Ongoing (interventions implemented as appropriate)  Pt tolerated prolia injection well this shift. VSS. Calendar given to pt. Pt going to her mammogram this afternoon at one.

## 2019-08-12 ENCOUNTER — TELEPHONE (OUTPATIENT)
Dept: HEMATOLOGY/ONCOLOGY | Facility: CLINIC | Age: 74
End: 2019-08-12

## 2019-08-12 NOTE — TELEPHONE ENCOUNTER
----- Message from Viki Aquino sent at 8/12/2019  8:00 AM CDT -----  Contact: pt  Pt is wanting to reschedule her 8/19 Xray a later time ,please. Pt would like to schedule closer to her Dr appointment because around that time she states will be running into all the school traffic and lives 1 hour away,9 am will be finetoo...[phone] or [phone] leave amessage

## 2019-08-12 NOTE — TELEPHONE ENCOUNTER
Spoke with pt.  Pt has appt w Dr. Nogueira on same day as xray at [facility] location, and requested to have xray appt moved closer to time of his appt.  Appt changed per pt's request.  Pt verbalized understanding of appt time change.

## 2019-08-19 ENCOUNTER — OFFICE VISIT (OUTPATIENT)
Dept: FAMILY MEDICINE | Facility: CLINIC | Age: 74
End: 2019-08-19
Payer: MEDICARE

## 2019-08-19 ENCOUNTER — HOSPITAL ENCOUNTER (OUTPATIENT)
Dept: RADIOLOGY | Facility: HOSPITAL | Age: 74
Discharge: HOME | End: 2019-08-19
Attending: NURSE PRACTITIONER
Payer: MEDICARE

## 2019-08-19 VITALS
DIASTOLIC BLOOD PRESSURE: 82 MMHG | HEIGHT: 66 IN | WEIGHT: 154.31 LBS | OXYGEN SATURATION: 98 % | HEART RATE: 57 BPM | BODY MASS INDEX: 24.8 KG/M2 | TEMPERATURE: 98 F | SYSTOLIC BLOOD PRESSURE: 136 MMHG

## 2019-08-19 DIAGNOSIS — E78.00 PURE HYPERCHOLESTEROLEMIA: Primary | ICD-10-CM

## 2019-08-19 DIAGNOSIS — E03.9 ACQUIRED HYPOTHYROIDISM: ICD-10-CM

## 2019-08-19 DIAGNOSIS — Z17.0 MALIGNANT NEOPLASM OF RIGHT BREAST IN FEMALE, ESTROGEN RECEPTOR POSITIVE, UNSPECIFIED SITE OF BREAST: ICD-10-CM

## 2019-08-19 DIAGNOSIS — K21.9 GASTROESOPHAGEAL REFLUX DISEASE WITHOUT ESOPHAGITIS: ICD-10-CM

## 2019-08-19 DIAGNOSIS — C50.911 MALIGNANT NEOPLASM OF RIGHT BREAST IN FEMALE, ESTROGEN RECEPTOR POSITIVE, UNSPECIFIED SITE OF BREAST: ICD-10-CM

## 2019-08-19 PROCEDURE — 99999 PR PBB SHADOW E&M-EST. PATIENT-LVL III: ICD-10-PCS | Mod: PBBFAC,,, | Performed by: FAMILY MEDICINE

## 2019-08-19 PROCEDURE — 71046 X-RAY EXAM CHEST 2 VIEWS: CPT | Mod: 26,,, | Performed by: RADIOLOGY

## 2019-08-19 PROCEDURE — 99214 PR OFFICE/OUTPT VISIT, EST, LEVL IV, 30-39 MIN: ICD-10-PCS | Mod: S$PBB,,, | Performed by: FAMILY MEDICINE

## 2019-08-19 PROCEDURE — 99999 PR PBB SHADOW E&M-EST. PATIENT-LVL III: CPT | Mod: PBBFAC,,, | Performed by: FAMILY MEDICINE

## 2019-08-19 PROCEDURE — 99214 OFFICE O/P EST MOD 30 MIN: CPT | Mod: S$PBB,,, | Performed by: FAMILY MEDICINE

## 2019-08-19 PROCEDURE — 99213 OFFICE O/P EST LOW 20 MIN: CPT | Mod: PBBFAC,25,PO | Performed by: FAMILY MEDICINE

## 2019-08-19 PROCEDURE — 71046 X-RAY EXAM CHEST 2 VIEWS: CPT | Mod: TC,FY,PO

## 2019-08-19 PROCEDURE — 71046 XR CHEST PA AND LATERAL: ICD-10-PCS | Mod: 26,,, | Performed by: RADIOLOGY

## 2019-08-19 RX ORDER — LEVOTHYROXINE SODIUM 88 UG/1
88 TABLET ORAL DAILY
Qty: 90 TABLET | Refills: 3 | Status: SHIPPED | OUTPATIENT
Start: 2019-08-19 | End: 2020-08-20 | Stop reason: SDUPTHER

## 2019-08-19 NOTE — PROGRESS NOTES
Subjective:       Patient ID: Sarahy Coronel is a 73 y.o. female.    Chief Complaint: Follow-up (6 month)    Here for f/u lipids and chronic medical issues. Doing well overall.     Hyperlipidemia   This is a chronic problem. The current episode started more than 1 year ago. The problem is controlled. Pertinent negatives include no chest pain or shortness of breath.     Review of Systems   Constitutional: Negative for chills, fatigue and fever.   Respiratory: Negative for cough, chest tightness and shortness of breath.    Cardiovascular: Negative for chest pain, palpitations and leg swelling.   Endocrine: Negative for cold intolerance and heat intolerance.   Skin: Negative for rash.   Psychiatric/Behavioral: Negative for dysphoric mood. The patient is not nervous/anxious.        Objective:      Physical Exam   Constitutional: She appears well-developed and well-nourished.   HENT:   Head: Normocephalic and atraumatic.   Cardiovascular: Normal rate, regular rhythm and normal heart sounds.   Pulmonary/Chest: Effort normal and breath sounds normal.   Psychiatric: She has a normal mood and affect.   Nursing note and vitals reviewed.      Assessment:       1. Pure hypercholesterolemia    2. Acquired hypothyroidism    3. Gastroesophageal reflux disease without esophagitis        Plan:       Pure hypercholesterolemia  -     CBC auto differential; Future; Expected date: 08/19/2019  -     Comprehensive metabolic panel; Future; Expected date: 08/19/2019  -     Lipid panel; Future; Expected date: 08/19/2019  -     TSH; Future; Expected date: 08/19/2019    Acquired hypothyroidism  -     CBC auto differential; Future; Expected date: 08/19/2019  -     Comprehensive metabolic panel; Future; Expected date: 08/19/2019  -     Lipid panel; Future; Expected date: 08/19/2019  -     TSH; Future; Expected date: 08/19/2019    Gastroesophageal reflux disease without esophagitis    Other orders  -     SYNTHROID 88 mcg tablet; Take 1 tablet (88  mcg total) by mouth once daily.  Dispense: 90 tablet; Refill: 3      Update labs and health maintenance  Lipid recheck  gerd stable  Will monitor chronic medical issues and continue current plan of care.      Follow up in about 6 months (around 2/19/2020), or if symptoms worsen or fail to improve.

## 2019-08-21 ENCOUNTER — TELEPHONE (OUTPATIENT)
Dept: FAMILY MEDICINE | Facility: CLINIC | Age: 74
End: 2019-08-21

## 2019-08-21 DIAGNOSIS — E03.9 ACQUIRED HYPOTHYROIDISM: Primary | ICD-10-CM

## 2019-08-21 NOTE — TELEPHONE ENCOUNTER
Please place external order for potassium recheck for 3 weeks. She would like to have a NATALYA Voss.

## 2019-09-10 ENCOUNTER — LAB VISIT (OUTPATIENT)
Dept: LAB | Facility: HOSPITAL | Age: 74
End: 2019-09-10
Attending: NURSE PRACTITIONER
Payer: MEDICARE

## 2019-09-10 DIAGNOSIS — M89.9 DISORDER OF BONE AND CARTILAGE: ICD-10-CM

## 2019-09-10 DIAGNOSIS — M94.9 DISORDER OF BONE AND CARTILAGE: ICD-10-CM

## 2019-09-10 DIAGNOSIS — C50.911 MALIGNANT NEOPLASM OF RIGHT BREAST IN FEMALE, ESTROGEN RECEPTOR POSITIVE, UNSPECIFIED SITE OF BREAST: ICD-10-CM

## 2019-09-10 DIAGNOSIS — Z17.0 MALIGNANT NEOPLASM OF RIGHT BREAST IN FEMALE, ESTROGEN RECEPTOR POSITIVE, UNSPECIFIED SITE OF BREAST: ICD-10-CM

## 2019-09-10 LAB
25(OH)D3+25(OH)D2 SERPL-MCNC: 48 NG/ML (ref 30–96)
ANION GAP SERPL CALC-SCNC: 10 MMOL/L (ref 8–16)
BUN SERPL-MCNC: 19 MG/DL (ref 8–23)
CALCIUM SERPL-MCNC: 9.5 MG/DL (ref 8.7–10.5)
CHLORIDE SERPL-SCNC: 107 MMOL/L (ref 95–110)
CO2 SERPL-SCNC: 25 MMOL/L (ref 23–29)
CREAT SERPL-MCNC: 0.8 MG/DL (ref 0.5–1.4)
EST. GFR  (AFRICAN AMERICAN): >60 ML/MIN/1.73 M^2
EST. GFR  (NON AFRICAN AMERICAN): >60 ML/MIN/1.73 M^2
GLUCOSE SERPL-MCNC: 99 MG/DL (ref 70–110)
POTASSIUM SERPL-SCNC: 3.9 MMOL/L (ref 3.5–5.1)
SODIUM SERPL-SCNC: 142 MMOL/L (ref 136–145)

## 2019-09-10 PROCEDURE — 80048 BASIC METABOLIC PNL TOTAL CA: CPT | Mod: PO

## 2019-09-10 PROCEDURE — 36415 COLL VENOUS BLD VENIPUNCTURE: CPT | Mod: PO

## 2019-09-10 PROCEDURE — 82306 VITAMIN D 25 HYDROXY: CPT

## 2019-09-12 ENCOUNTER — TELEPHONE (OUTPATIENT)
Dept: FAMILY MEDICINE | Facility: CLINIC | Age: 74
End: 2019-09-12

## 2019-09-12 NOTE — TELEPHONE ENCOUNTER
----- Message from Frances Roldan sent at 9/12/2019  2:41 PM CDT -----  Contact: Sarahy pt  Type:  Test Results    Who Called:  Sarahy  Name of Test (Lab/Mammo/Etc):  labs  Date of Test:  tuesday  Ordering Provider:  Jero  Where the test was performed:  n/a  Best Call Back Number: [facility] [phone] or [place] [phone]  Additional Information:  Pls call pt regarding her results

## 2019-09-13 ENCOUNTER — TELEPHONE (OUTPATIENT)
Dept: HEMATOLOGY/ONCOLOGY | Facility: CLINIC | Age: 74
End: 2019-09-13

## 2019-09-13 NOTE — TELEPHONE ENCOUNTER
Attempted to rtn pt call.  No answer.  LM for pt to rtn call w office number provided.  Informed pt of need to schedule a 6mth f/u visit.

## 2019-09-13 NOTE — TELEPHONE ENCOUNTER
----- Message from Christi Lloyd sent at 9/13/2019  3:23 PM CDT -----  Contact: Patient  Type:  Test Results    Who Called:  Patient  Name of Test (Lab/Mammo/Etc): labs  Date of Test:  9/10  Ordering Provider:  Hernandez Zamora  Where the test was performed:  [facility]  Best Call Back Number:  [phone] (home)    Additional Information:  na

## 2019-09-16 ENCOUNTER — INFUSION (OUTPATIENT)
Dept: INFUSION THERAPY | Facility: HOSPITAL | Age: 74
End: 2019-09-16
Attending: NURSE PRACTITIONER
Payer: MEDICARE

## 2019-09-16 ENCOUNTER — OFFICE VISIT (OUTPATIENT)
Dept: HEMATOLOGY/ONCOLOGY | Facility: CLINIC | Age: 74
End: 2019-09-16
Payer: MEDICARE

## 2019-09-16 VITALS
DIASTOLIC BLOOD PRESSURE: 77 MMHG | TEMPERATURE: 98 F | HEART RATE: 50 BPM | BODY MASS INDEX: 24.48 KG/M2 | WEIGHT: 152.31 LBS | HEIGHT: 66 IN | SYSTOLIC BLOOD PRESSURE: 167 MMHG | RESPIRATION RATE: 16 BRPM

## 2019-09-16 VITALS
TEMPERATURE: 98 F | HEIGHT: 66 IN | DIASTOLIC BLOOD PRESSURE: 77 MMHG | SYSTOLIC BLOOD PRESSURE: 167 MMHG | OXYGEN SATURATION: 98 % | WEIGHT: 152.31 LBS | HEART RATE: 50 BPM | RESPIRATION RATE: 16 BRPM | BODY MASS INDEX: 24.48 KG/M2

## 2019-09-16 DIAGNOSIS — M81.0 SENILE OSTEOPOROSIS: ICD-10-CM

## 2019-09-16 DIAGNOSIS — M81.0 SENILE OSTEOPOROSIS: Primary | ICD-10-CM

## 2019-09-16 DIAGNOSIS — Z17.0 MALIGNANT NEOPLASM OF RIGHT BREAST IN FEMALE, ESTROGEN RECEPTOR POSITIVE, UNSPECIFIED SITE OF BREAST: Primary | ICD-10-CM

## 2019-09-16 DIAGNOSIS — Z90.11 H/O RIGHT MASTECTOMY: ICD-10-CM

## 2019-09-16 DIAGNOSIS — C50.911 MALIGNANT NEOPLASM OF RIGHT BREAST IN FEMALE, ESTROGEN RECEPTOR POSITIVE, UNSPECIFIED SITE OF BREAST: Primary | ICD-10-CM

## 2019-09-16 PROCEDURE — 99214 PR OFFICE/OUTPT VISIT, EST, LEVL IV, 30-39 MIN: ICD-10-PCS | Mod: S$PBB,,, | Performed by: NURSE PRACTITIONER

## 2019-09-16 PROCEDURE — 99999 PR PBB SHADOW E&M-EST. PATIENT-LVL V: ICD-10-PCS | Mod: PBBFAC,,, | Performed by: NURSE PRACTITIONER

## 2019-09-16 PROCEDURE — 63600175 PHARM REV CODE 636 W HCPCS: Mod: JG,PN | Performed by: NURSE PRACTITIONER

## 2019-09-16 PROCEDURE — 99999 PR PBB SHADOW E&M-EST. PATIENT-LVL V: CPT | Mod: PBBFAC,,, | Performed by: NURSE PRACTITIONER

## 2019-09-16 PROCEDURE — 99215 OFFICE O/P EST HI 40 MIN: CPT | Mod: PBBFAC,PN | Performed by: NURSE PRACTITIONER

## 2019-09-16 PROCEDURE — 96372 THER/PROPH/DIAG INJ SC/IM: CPT | Mod: PN

## 2019-09-16 PROCEDURE — 99214 OFFICE O/P EST MOD 30 MIN: CPT | Mod: S$PBB,,, | Performed by: NURSE PRACTITIONER

## 2019-09-16 RX ADMIN — DENOSUMAB 60 MG: 60 INJECTION SUBCUTANEOUS at 11:09

## 2019-09-16 NOTE — PROGRESS NOTES
HISTORY OF PRESENT ILLNESS:  The patient is a 73-year-old white female   known to Dr. Ricardo for Stage I (T1b, N0, M0) papillary right breast carcinoma.    Tumor was found to be ER/AL positive & Jed8qzu negative.  She is status   post right mastectomy (11/16/17) for residual disease per Dr. Starr.    Oncotype DX did not have enough tissue for analysis.  She was started on   Arimidex on her last visit (03/2018).  She received her 1st Prolia injection on   03/14/2018.  The patient also carries the diagnosis of Osteoporosis that   was diagnosed in 2015.    She returns to clinic today for her 18 month post evaluation and next Prolia.    She continues to have mild night sweats.  She continues to work out 3 x's a   week using weights.  She denies any difficulties with chest pain, nipple discharge,   unbearable hot flashes, fevers, chills, abdominal discomfort, nausea, vomiting,   diarrhea, bleeding, etc.   She denies any invasive dental work within the last   3 months.  Recent cleaning with no complications.  No new complaints or   pertinent findings on a 14-point review of systems.    PHYSICAL EXAMINATION:  GENERAL:  Well-developed, well-nourished elderly white female in no acute   distress.  Alert & oriented x 3.  VITAL SIGNS:  Weight:  Loss of 8 1/2 pounds in 6 months with exercise/diet  Wt Readings from Last 3 Encounters:   09/16/19 69.1 kg (152 lb 5.4 oz)   08/19/19 70 kg (154 lb 5.2 oz)   03/15/19 73 kg (160 lb 15 oz)     Temp Readings from Last 3 Encounters:   09/16/19 97.9 °F (36.6 °C) (Oral)   08/19/19 98.1 °F (36.7 °C)   03/15/19 97.8 °F (36.6 °C)     BP Readings from Last 3 Encounters:   09/16/19 (!) 167/77   08/19/19 136/82   03/15/19 (!) 156/77     Pulse Readings from Last 3 Encounters:   09/16/19 (!) 50   08/19/19 (!) 57   03/15/19 (!) 55     HEENT:  Normocephalic, atraumatic.  Oral mucosa pink and moist.  Lips without   lesions.  Tongue midline.  Oropharynx clear.  Nonicteric sclerae.   NECK:  Supple, no  adenopathy.  No carotid bruits, thyromegaly or thyroid nodule.  HEART:  Regular rate and rhythm without murmur, gallop or rub.                LUNGS:  Clear to auscultation bilaterally.  Normal respiratory effort.       ABDOMEN:  Soft, nontender, nondistended with positive normoactive bowel sounds,   no hepatosplenomegaly.    EXTREMITIES:  No cyanosis, clubbing or edema.  Distal pulses are intact.  AXILLAE AND GROIN:  No palpable pathologic lymphadenopathy is appreciated.        SKIN:  Intact/turgor normal.  NEUROLOGIC:  Cranial nerves II-XII grossly intact.  Motor:  Good muscle bulk and   tone.  Strength/sensory 5/5 throughout.  Gait stable.  BREASTS:  Right breast mastectomy with no signs of reoccurrence about the chest wall.  Left breast soft, free of masses, tenderness, nipple discharge or inversion.      LABORATORY:    BMP  Lab Results   Component Value Date     09/10/2019    K 3.9 09/10/2019     09/10/2019    CO2 25 09/10/2019    BUN 19 09/10/2019    CREATININE 0.8 09/10/2019    CALCIUM 9.5 09/10/2019    ANIONGAP 10 09/10/2019    ESTGFRAFRICA >60 09/10/2019    EGFRNONAA >60 09/10/2019     Vitamin D:  48    RADIOLOGY:  CXR dated 08/19/2019:  Impression:    No acute cardiopulmonary abnormality appreciated radiographically.  No interval detrimental change in the radiographic appearance of the chest.    IMPRESSION:  1.  Stage I papillary right breast carcinoma, ER/DC positive - s/p right mastectomy 11/16/2017  2.  Senile osteoporosis - last BMD 12/11/2017  3.  Use of Arimidex    PLAN:  1.  Continue Arimidex 1 mg daily with a planned duration of 60 months; started 03/2018.  2.  Continue calcium supplementation with vitamin D.  3.  Proceed with Prolia 60 mg SQ today.  4.  Notify the clinic for any need of invasive dental work.  5.  Return to clinic in 6 months for 24 month post evaluation/next Prolia with interval    CBC, CMP, LDH, VIT D, BMD & Mammogram, left prior.  6.  Rx given for  Bra/Prosthesis.    Assessment/plan reviewed and approved by Dr. Ricardo.

## 2019-09-24 ENCOUNTER — TELEPHONE (OUTPATIENT)
Dept: FAMILY MEDICINE | Facility: CLINIC | Age: 74
End: 2019-09-24

## 2019-10-11 ENCOUNTER — TELEPHONE (OUTPATIENT)
Dept: HEMATOLOGY/ONCOLOGY | Facility: CLINIC | Age: 74
End: 2019-10-11

## 2019-10-11 NOTE — TELEPHONE ENCOUNTER
Received call from Mary at [facility] stating she has been requesting office note since Sept on this patient via fax with no response and patient is very upset.  Attempted to confirm fax number that request was sent to, Mary discovered that [facility] was faxing to [place], not [place] office. Mary apologized and will inform patient of above.   Sept note faxed to [facility] as requested.

## 2019-10-16 ENCOUNTER — TELEPHONE (OUTPATIENT)
Dept: HEMATOLOGY/ONCOLOGY | Facility: CLINIC | Age: 74
End: 2019-10-16

## 2019-10-16 NOTE — TELEPHONE ENCOUNTER
----- Message from Spike Bolden sent at 10/16/2019  9:58 AM CDT -----  Contact: [facility] called regarding patient chart notes, it needs to be signed and dated with the date 9/16/19. Please call back at [phone]. Thanks

## 2019-11-08 ENCOUNTER — TELEPHONE (OUTPATIENT)
Dept: NEUROLOGY | Facility: CLINIC | Age: 74
End: 2019-11-08

## 2019-11-08 NOTE — TELEPHONE ENCOUNTER
----- Message from Nabila Herring sent at 11/8/2019 10:34 AM CST -----  Contact: Marisol  Type: Needs Medical Advice    Who Called:  Marisol Carroll Call Back Number: [phone]  Fax number [phone]  Additional Information: she states that she faxed a physician's order for mastectomy bra on 10/31/2019 and 11/01/2019. States that she has not received a response. Please give call back.

## 2019-12-12 ENCOUNTER — HOSPITAL ENCOUNTER (OUTPATIENT)
Dept: RADIOLOGY | Facility: HOSPITAL | Age: 74
Discharge: HOME | End: 2019-12-12
Attending: NURSE PRACTITIONER
Payer: MEDICARE

## 2019-12-12 ENCOUNTER — OFFICE VISIT (OUTPATIENT)
Dept: FAMILY MEDICINE | Facility: CLINIC | Age: 74
End: 2019-12-12
Payer: MEDICARE

## 2019-12-12 VITALS
DIASTOLIC BLOOD PRESSURE: 80 MMHG | SYSTOLIC BLOOD PRESSURE: 120 MMHG | HEART RATE: 60 BPM | HEIGHT: 66 IN | BODY MASS INDEX: 24.34 KG/M2 | OXYGEN SATURATION: 98 % | WEIGHT: 151.44 LBS

## 2019-12-12 DIAGNOSIS — M81.0 OSTEOPOROSIS, UNSPECIFIED OSTEOPOROSIS TYPE, UNSPECIFIED PATHOLOGICAL FRACTURE PRESENCE: ICD-10-CM

## 2019-12-12 DIAGNOSIS — Z00.00 ENCOUNTER FOR PREVENTIVE HEALTH EXAMINATION: Primary | ICD-10-CM

## 2019-12-12 DIAGNOSIS — C50.911 MALIGNANT NEOPLASM OF RIGHT BREAST IN FEMALE, ESTROGEN RECEPTOR POSITIVE, UNSPECIFIED SITE OF BREAST: ICD-10-CM

## 2019-12-12 DIAGNOSIS — E78.00 PURE HYPERCHOLESTEROLEMIA: ICD-10-CM

## 2019-12-12 DIAGNOSIS — E03.9 ACQUIRED HYPOTHYROIDISM: ICD-10-CM

## 2019-12-12 DIAGNOSIS — K21.9 GASTROESOPHAGEAL REFLUX DISEASE WITHOUT ESOPHAGITIS: ICD-10-CM

## 2019-12-12 DIAGNOSIS — M81.0 SENILE OSTEOPOROSIS: ICD-10-CM

## 2019-12-12 DIAGNOSIS — Z17.0 MALIGNANT NEOPLASM OF RIGHT BREAST IN FEMALE, ESTROGEN RECEPTOR POSITIVE, UNSPECIFIED SITE OF BREAST: ICD-10-CM

## 2019-12-12 DIAGNOSIS — M65.4 RADIAL STYLOID TENOSYNOVITIS (DE QUERVAIN): ICD-10-CM

## 2019-12-12 PROCEDURE — G0439 PPPS, SUBSEQ VISIT: HCPCS | Mod: S$GLB,,, | Performed by: NURSE PRACTITIONER

## 2019-12-12 PROCEDURE — G0439 PR MEDICARE ANNUAL WELLNESS SUBSEQUENT VISIT: ICD-10-PCS | Mod: S$GLB,,, | Performed by: NURSE PRACTITIONER

## 2019-12-12 PROCEDURE — 77080 DXA BONE DENSITY AXIAL: CPT | Mod: TC,PO

## 2019-12-12 PROCEDURE — 99999 PR PBB SHADOW E&M-EST. PATIENT-LVL IV: ICD-10-PCS | Mod: PBBFAC,,, | Performed by: NURSE PRACTITIONER

## 2019-12-12 PROCEDURE — 77080 DXA BONE DENSITY AXIAL: CPT | Mod: 26,,, | Performed by: RADIOLOGY

## 2019-12-12 PROCEDURE — 99214 OFFICE O/P EST MOD 30 MIN: CPT | Mod: PBBFAC,25,PO | Performed by: NURSE PRACTITIONER

## 2019-12-12 PROCEDURE — 77080 DEXA BONE DENSITY SPINE HIP: ICD-10-PCS | Mod: 26,,, | Performed by: RADIOLOGY

## 2019-12-12 PROCEDURE — 99999 PR PBB SHADOW E&M-EST. PATIENT-LVL IV: CPT | Mod: PBBFAC,,, | Performed by: NURSE PRACTITIONER

## 2019-12-12 RX ORDER — MELOXICAM 7.5 MG/1
7.5 TABLET ORAL DAILY
Qty: 14 TABLET | Refills: 0 | Status: SHIPPED | OUTPATIENT
Start: 2019-12-12 | End: 2019-12-26

## 2019-12-12 NOTE — PATIENT INSTRUCTIONS
Counseling and Referral of Other Preventative  (Italic type indicates deductible and co-insurance are waived)    Patient Name: Sarahy oCronel  Today's Date: 12/12/2019    Health Maintenance       Date Due Completion Date    Shingles Vaccine (1 of 2) 10/14/1997 ---    Mammogram 03/15/2020 3/15/2019    DEXA SCAN 12/11/2020 12/11/2017    Lipid Panel 08/19/2024 8/19/2019    Colonoscopy 09/18/2025 9/18/2015    Override on 1/9/2010: Done    TETANUS VACCINE 05/16/2027 5/16/2017        No orders of the defined types were placed in this encounter.    The following information is provided to all patients.  This information is to help you find resources for any of the problems found today that may be affecting your health:                Living healthy guide: www.[facility].[place].gov      Understanding Diabetes: www.diabetes.org      Eating healthy: www.cdc.gov/healthyweight      CDC home safety checklist: www.cdc.gov/steadi/patient.html      Agency on Aging: www.goea.[place].gov      Alcoholics anonymous (AA): www.aa.org      Physical Activity: www.crystal.nih.gov/kw6xwzw      Tobacco use: www.quitwithusla.org

## 2019-12-12 NOTE — PROGRESS NOTES
"Dymple Seal presented for a  Medicare AWV and comprehensive Health Risk Assessment today. The following components were reviewed and updated:    · Medical history  · Family History  · Social history  · Allergies and Current Medications  · Health Risk Assessment  · Health Maintenance  · Care Team     ** See Completed Assessments for Annual Wellness Visit within the encounter summary.**     The following assessments were completed:  · Living Situation  · CAGE  · Depression Screening  · Timed Get Up and Go  · Whisper Test  · Cognitive Function Screening      · Nutrition Screening  · ADL Screening  · PAQ Screening    Vitals:    12/12/19 1147   BP: 120/80   Pulse: 60   SpO2: 98%   Weight: 68.7 kg (151 lb 7.3 oz)   Height: 5' 6" (1.676 m)     Body mass index is 24.45 kg/m².  Physical Exam   Constitutional: She is oriented to person, place, and time. She appears well-nourished.   Cardiovascular: Normal rate, regular rhythm, normal heart sounds and intact distal pulses.   Pulmonary/Chest: Effort normal and breath sounds normal.   Musculoskeletal:        Left wrist: She exhibits tenderness, bony tenderness and swelling. She exhibits normal range of motion, no effusion, no crepitus, no deformity and no laceration.        Hands:  Positive finkelstein test   Neurological: She is alert and oriented to person, place, and time.   Skin: Skin is warm and dry.   Vitals reviewed.      Diagnoses and health risks identified today and associated recommendations/orders:    1. Encounter for preventive health examination  Reviewed and discussed health maintenance.      2. Acquired hypothyroidism  Stable- continue current treatment and follow up routinely with PCP     3. Malignant neoplasm of right breast in female, estrogen receptor positive, unspecified site of breast  Stable- continue current treatment and follow up routinely with PCP and hem/onc (Quique)    4. Pure hypercholesterolemia  Stable- continue current treatment and follow up " routinely with PCP     5. Gastroesophageal reflux disease without esophagitis  Stable- continue current treatment and follow up routinely with PCP     6. Osteoporosis, unspecified osteoporosis type, unspecified pathological fracture presence  dexa today  Continue current treatment     7. Radial styloid tenosynovitis (de quervain)  Rest and ice  Wrist brace to immobilize  mobic daily for 1 week   Follow up with PCP if no improvement    Provided Dymple with a 5-10 year written screening schedule and personal prevention plan. Recommendations were developed using the USPSTF age appropriate recommendations. Education, counseling, and referrals were provided as needed. After Visit Summary printed and given to patient which includes a list of additional screenings\tests needed.    Mena Shea, NP

## 2020-01-24 DIAGNOSIS — M81.0 SENILE OSTEOPOROSIS: ICD-10-CM

## 2020-01-24 DIAGNOSIS — C50.011 MALIGNANT NEOPLASM OF NIPPLE OF RIGHT BREAST IN FEMALE, UNSPECIFIED ESTROGEN RECEPTOR STATUS: ICD-10-CM

## 2020-01-24 RX ORDER — ANASTROZOLE 1 MG/1
1 TABLET ORAL DAILY
Qty: 90 TABLET | Refills: 3 | Status: SHIPPED | OUTPATIENT
Start: 2020-01-24 | End: 2021-01-11 | Stop reason: SDUPTHER

## 2020-02-18 ENCOUNTER — TELEPHONE (OUTPATIENT)
Dept: FAMILY MEDICINE | Facility: CLINIC | Age: 75
End: 2020-02-18

## 2020-02-18 NOTE — TELEPHONE ENCOUNTER
----- Message from Mehnaz Boss sent at 2/18/2020  4:51 PM CST -----  Contact: patient  Type:  Sooner Apoointment Request    Caller is requesting a sooner appointment.  Caller declined first available appointment listed below.  Caller will not accept being placed on the waitlist and is requesting a message be sent to doctor.    Name of Caller:  patient  When is the first available appointment?  Scheduled for 2/20  Symptoms:  Fever, congestion, cough  Best Call Back Number:    Additional Information:  Please advise-thank you

## 2020-02-18 NOTE — TELEPHONE ENCOUNTER
Anesthesia Post Op Note


Date & Time


Mar 29, 2018 at 10:31





Vital Signs


Pain Intensity:  0





Vital Signs Past 12 Hours








  Date Time  Temp Pulse Resp B/P (MAP) Pulse Ox O2 Delivery O2 Flow Rate FiO2


 


3/29/18 09:36 36.7 55 16 109/56 100 Diffusion Mask 5 


 


3/29/18 08:05 37.1 87 18 128/84 (99) 98 Room Air  











Notes


Mental Status:  alert / awake / arousable, participated in evaluation


Pt Amnestic to Procedure:  Yes


Nausea / Vomiting:  adequately controlled


Pain:  adequately controlled


Airway Patency, RR, SpO2:  stable & adequate


BP & HR:  stable & adequate


Hydration State:  stable & adequate


Anesthetic Complications:  no major complications apparent Scheduled with Ms Jenkins as requested.

## 2020-02-19 ENCOUNTER — OFFICE VISIT (OUTPATIENT)
Dept: FAMILY MEDICINE | Facility: CLINIC | Age: 75
End: 2020-02-19
Payer: MEDICARE

## 2020-02-19 VITALS
WEIGHT: 147.69 LBS | DIASTOLIC BLOOD PRESSURE: 84 MMHG | HEART RATE: 63 BPM | BODY MASS INDEX: 23.84 KG/M2 | TEMPERATURE: 98 F | SYSTOLIC BLOOD PRESSURE: 134 MMHG | OXYGEN SATURATION: 99 %

## 2020-02-19 DIAGNOSIS — R05.9 COUGH IN ADULT: ICD-10-CM

## 2020-02-19 DIAGNOSIS — E03.9 ACQUIRED HYPOTHYROIDISM: ICD-10-CM

## 2020-02-19 DIAGNOSIS — J30.89 ALLERGIC RHINITIS DUE TO OTHER ALLERGIC TRIGGER, UNSPECIFIED SEASONALITY: ICD-10-CM

## 2020-02-19 DIAGNOSIS — R10.9 RIGHT FLANK DISCOMFORT: ICD-10-CM

## 2020-02-19 DIAGNOSIS — R30.0 BURNING WITH URINATION: Primary | ICD-10-CM

## 2020-02-19 DIAGNOSIS — E78.00 PURE HYPERCHOLESTEROLEMIA: ICD-10-CM

## 2020-02-19 PROCEDURE — 99213 OFFICE O/P EST LOW 20 MIN: CPT | Mod: PBBFAC,PO | Performed by: NURSE PRACTITIONER

## 2020-02-19 PROCEDURE — 99214 OFFICE O/P EST MOD 30 MIN: CPT | Mod: S$PBB,,, | Performed by: NURSE PRACTITIONER

## 2020-02-19 PROCEDURE — 99999 PR PBB SHADOW E&M-EST. PATIENT-LVL III: ICD-10-PCS | Mod: PBBFAC,,, | Performed by: NURSE PRACTITIONER

## 2020-02-19 PROCEDURE — 99999 PR PBB SHADOW E&M-EST. PATIENT-LVL III: CPT | Mod: PBBFAC,,, | Performed by: NURSE PRACTITIONER

## 2020-02-19 PROCEDURE — 81001 URINALYSIS AUTO W/SCOPE: CPT | Mod: PBBFAC,PO | Performed by: NURSE PRACTITIONER

## 2020-02-19 PROCEDURE — 99214 PR OFFICE/OUTPT VISIT, EST, LEVL IV, 30-39 MIN: ICD-10-PCS | Mod: S$PBB,,, | Performed by: NURSE PRACTITIONER

## 2020-02-19 RX ORDER — PROMETHAZINE HYDROCHLORIDE AND DEXTROMETHORPHAN HYDROBROMIDE 6.25; 15 MG/5ML; MG/5ML
5 SYRUP ORAL EVERY 8 HOURS PRN
Qty: 120 ML | Refills: 0 | Status: SHIPPED | OUTPATIENT
Start: 2020-02-19 | End: 2020-02-29

## 2020-02-19 RX ORDER — CETIRIZINE HYDROCHLORIDE 10 MG/1
10 TABLET ORAL DAILY
Qty: 30 TABLET | Refills: 1 | Status: SHIPPED | OUTPATIENT
Start: 2020-02-19 | End: 2020-04-13

## 2020-02-19 RX ORDER — MONTELUKAST SODIUM 5 MG/1
10 TABLET, CHEWABLE ORAL NIGHTLY
Qty: 60 TABLET | Refills: 1 | Status: SHIPPED | OUTPATIENT
Start: 2020-02-19 | End: 2020-04-13

## 2020-02-19 NOTE — PATIENT INSTRUCTIONS
Please call and leave a message if not improved by Friday morning or if your symptoms worsening with temperature greater than 100.    Allergic Rhinitis  Allergic rhinitis is an allergic reaction that affects the nose, and often the eyes. Its often known as nasal allergies. Nasal allergies are often due to things in the environment that are breathed in. Depending what you are sensitive to, nasal allergies may occur only during certain seasons. Or they may occur year round. Common indoor allergens include house dust mites, mold, cockroaches, and pet dander. Outdoor allergens include pollen from trees, grasses, and weeds.   Symptoms include a drippy, stuffy, and itchy nose. They also include sneezing and red and itchy eyes. You may feel tired more often. Severe allergies may also affect your breathing and trigger a condition called asthma.   Tests can be done to see what allergens are affecting you. You may be referred to an allergy specialist for testing and further evaluation.  Home care  Your healthcare provider may prescribe medicines to help relieve allergy symptoms. These may include oral medicines, nasal sprays, or eye drops.  Ask your provider for advice on how to avoid substances that you are allergic to. Below are a few tips for each type of allergen.  Pet dander:  · Do not have pets with fur and feathers.  · If you can't avoid having a pet, keep it out of your bedroom and off upholstered furniture.  Pollen:  · When pollen counts are high, keep windows of your car and home closed. If possible, use an air conditioner instead.  · Wear a filter mask when mowing or doing yard work.  House dust mites:  · Wash bedding every week in warm water and detergent and dry on a hot setting.  · Cover the mattress, box spring, and pillows with allergy covers.   · If possible, sleep in a room with no carpet, curtains, or upholstered furniture.  Cockroaches:  · Store food in sealed containers.  · Remove garbage from the home  promptly.  · Fix water leaks  Mold:  · Keep humidity low by using a dehumidifier or air conditioner. Keep the dehumidifier and air conditioner clean and free of mold.  · Clean moldy areas with bleach and water.  In general:  · Vacuum once or twice a week. If possible, use a vacuum with a high-efficiency particulate air (HEPA) filter.  · Do not smoke. Avoid cigarette smoke. Cigarette smoke is an irritant that can make symptoms worse.  Follow-up care  Follow up as advised by the healthcare provider or our staff. If you were referred to an allergy specialist, make this appointment promptly.  When to seek medical advice  Call your healthcare provider right away if the following occur:  · Coughing or wheezing  · Fever greater than 100.4°F (38°C)  · Hives (raised red bumps)  · Continuing symptoms, new symptoms, or worsening symptoms  Call 911 right away if you have:  · Trouble breathing  · Severe swelling of the face or severe itching of the eyes or mouth  Date Last Reviewed: 3/1/2017  © 4246-3973 [employer]. [address] [place] [address], [place] [address]. All rights reserved. This information is not intended as a substitute for professional medical care. Always follow your healthcare professional's instructions.

## 2020-02-19 NOTE — PROGRESS NOTES
Subjective:       Patient ID: Sarahy Coronel is a 74 y.o. female.    Chief Complaint: Nasal Congestion and Cough  Patient was last seen by me on 10/15/2018.  Last seen by PCP; Jero on 08/19/2019.   HPI   On Monday started running fever and chills, temp 100.7 yesterday and coughing began last night. She has taken tylenol and Zicam. Last dose of tylenol was at 4 am today.  She is also having right flank and right lower quad discomfort and urinary burning.  Vitals:    02/19/20 1100   BP: 134/84   Pulse: 63   Temp: 98.3 °F (36.8 °C)     Review of Systems    She states many years ago she had to take allergy shots  Objective:      Physical Exam   Constitutional: She is oriented to person, place, and time. Vital signs are normal. She appears well-developed and well-nourished. She is active and cooperative.   HENT:   Head: Normocephalic and atraumatic.   Right Ear: Hearing, tympanic membrane, external ear and ear canal normal.   Left Ear: Hearing, tympanic membrane, external ear and ear canal normal.   Nose: Nose normal.   Mouth/Throat: Uvula is midline, oropharynx is clear and moist and mucous membranes are normal.   Eyes: Lids are normal. Right eye exhibits no discharge. Left eye exhibits no discharge.   Neck: Trachea normal, normal range of motion, full passive range of motion without pain and phonation normal. Neck supple.   Cardiovascular: Normal rate and regular rhythm.   Pulmonary/Chest: Effort normal and breath sounds normal.   Abdominal: Soft. Bowel sounds are normal. There is no tenderness.   Lymphadenopathy:        Head (right side): No submental, no submandibular, no tonsillar, no preauricular, no posterior auricular and no occipital adenopathy present.        Head (left side): No submental, no submandibular, no tonsillar, no preauricular, no posterior auricular and no occipital adenopathy present.     She has no cervical adenopathy.   Neurological: She is alert and oriented to person, place, and time.    Skin: Skin is warm, dry and intact.   Psychiatric: She has a normal mood and affect. Her speech is normal and behavior is normal. Judgment and thought content normal. Cognition and memory are normal.   Nursing note and vitals reviewed.      Assessment & Plan:       Burning with urination  -     POCT urinalysis, dipstick or tablet reag    Right flank discomfort  -     POCT urinalysis, dipstick or tablet reag    Cough in adult  -     promethazine-dextromethorphan (PROMETHAZINE-DM) 6.25-15 mg/5 mL Syrp; Take 5 mLs by mouth every 8 (eight) hours as needed (cough). May cause drowsiness  Dispense: 120 mL; Refill: 0    Allergic rhinitis due to other allergic trigger, unspecified seasonality  -     montelukast (SINGULAIR) 5 MG chewable tablet; Take 2 tablets (10 mg total) by mouth every evening. For allergies  Dispense: 60 tablet; Refill: 1  -     cetirizine (ZYRTEC) 10 MG tablet; Take 1 tablet (10 mg total) by mouth once daily. For allergies  Dispense: 30 tablet; Refill: 1    Acquired hypothyroidism- Stable synthroid  Lab Results   Component Value Date    TSH 0.839 08/19/2019       Pure hypercholesterolemia- Stable, lifestyle modification (dietary)  Lab Results   Component Value Date    CHOL 222 (H) 08/19/2019    CHOL 209 (H) 10/30/2018    CHOL 214 (H) 06/24/2017     Lab Results   Component Value Date    HDL 65 08/19/2019    HDL 60 10/30/2018    HDL 56 06/24/2017     Lab Results   Component Value Date    LDLCALC 129.4 08/19/2019    LDLCALC 128.2 10/30/2018    LDLCALC 136.2 06/24/2017     Lab Results   Component Value Date    TRIG 138 08/19/2019    TRIG 104 10/30/2018    TRIG 109 06/24/2017     Lab Results   Component Value Date    CHOLHDL 29.3 08/19/2019    CHOLHDL 28.7 10/30/2018    CHOLHDL 26.2 06/24/2017             Medication List with Changes/Refills   New Medications    CETIRIZINE (ZYRTEC) 10 MG TABLET    Take 1 tablet (10 mg total) by mouth once daily. For allergies    MONTELUKAST (SINGULAIR) 5 MG CHEWABLE TABLET     Take 2 tablets (10 mg total) by mouth every evening. For allergies    PROMETHAZINE-DEXTROMETHORPHAN (PROMETHAZINE-DM) 6.25-15 MG/5 ML SYRP    Take 5 mLs by mouth every 8 (eight) hours as needed (cough). May cause drowsiness   Current Medications    ANASTROZOLE (ARIMIDEX) 1 MG TAB    Take 1 tablet (1 mg total) by mouth once daily.    ASCORBIC ACID, VITAMIN C, (VITAMIN C) 1000 MG TABLET    Take 1,000 mg by mouth once daily.    CALCIUM-VITAMIN D (CALCIUM-VITAMIN D) 500 MG(1,250MG) -200 UNIT PER TABLET    Take 1 tablet by mouth once daily.      DENOSUMAB (PROLIA) 60 MG/ML SYRG    Inject 60 mg into the skin every 6 (six) months.     FISH OIL-OMEGA-3 FATTY ACIDS 300-1,000 MG CAPSULE    Take 1 capsule by mouth once daily.     LACTOBACILLUS RHAMNOSUS GG (CULTURELLE) 10 BILLION CELL CAPSULE    Take 1 capsule by mouth once daily.    LORATADINE (CLARITIN) 10 MG TABLET    Take 10 mg by mouth daily as needed.     MULTIVITAMIN CAPSULE    Take 1 capsule by mouth once daily.    SYNTHROID 88 MCG TABLET    Take 1 tablet (88 mcg total) by mouth once daily.     No follow-ups on file.

## 2020-02-21 ENCOUNTER — TELEPHONE (OUTPATIENT)
Dept: FAMILY MEDICINE | Facility: CLINIC | Age: 75
End: 2020-02-21

## 2020-02-21 LAB
BILIRUB SERPL-MCNC: ABNORMAL MG/DL
BLOOD URINE, POC: ABNORMAL
COLOR, POC UA: ABNORMAL
GLUCOSE UR QL STRIP: NORMAL
KETONES UR QL STRIP: ABNORMAL
LEUKOCYTE ESTERASE URINE, POC: ABNORMAL
NITRITE, POC UA: ABNORMAL
PH, POC UA: 9
PROTEIN, POC: ABNORMAL
SPECIFIC GRAVITY, POC UA: 1.03
UROBILINOGEN, POC UA: NORMAL

## 2020-02-21 NOTE — TELEPHONE ENCOUNTER
----- Message from Helen Engel sent at 2/21/2020 10:30 AM CST -----  Type: Needs Medical Advice    Who Called:  patient   Best Call Back Number: [phone] (home)  Additional Information: patient called to inform she is doing much better today following her visit on 02/19/2020

## 2020-03-16 ENCOUNTER — OFFICE VISIT (OUTPATIENT)
Dept: HEMATOLOGY/ONCOLOGY | Facility: CLINIC | Age: 75
End: 2020-03-16
Payer: MEDICARE

## 2020-03-16 ENCOUNTER — TELEPHONE (OUTPATIENT)
Dept: HEMATOLOGY/ONCOLOGY | Facility: CLINIC | Age: 75
End: 2020-03-16

## 2020-03-16 ENCOUNTER — INFUSION (OUTPATIENT)
Dept: INFUSION THERAPY | Facility: HOSPITAL | Age: 75
End: 2020-03-16
Attending: NURSE PRACTITIONER
Payer: MEDICARE

## 2020-03-16 VITALS
OXYGEN SATURATION: 100 % | WEIGHT: 152.56 LBS | SYSTOLIC BLOOD PRESSURE: 161 MMHG | DIASTOLIC BLOOD PRESSURE: 72 MMHG | TEMPERATURE: 98 F | RESPIRATION RATE: 16 BRPM | HEIGHT: 66 IN | HEART RATE: 56 BPM | BODY MASS INDEX: 24.52 KG/M2

## 2020-03-16 VITALS
BODY MASS INDEX: 24.52 KG/M2 | RESPIRATION RATE: 18 BRPM | DIASTOLIC BLOOD PRESSURE: 72 MMHG | HEART RATE: 56 BPM | HEIGHT: 66 IN | TEMPERATURE: 98 F | WEIGHT: 152.56 LBS | SYSTOLIC BLOOD PRESSURE: 161 MMHG

## 2020-03-16 DIAGNOSIS — Z90.11 HISTORY OF RIGHT MASTECTOMY: ICD-10-CM

## 2020-03-16 DIAGNOSIS — M89.9 DISORDER OF BONE AND CARTILAGE: ICD-10-CM

## 2020-03-16 DIAGNOSIS — M94.9 DISORDER OF BONE AND CARTILAGE: ICD-10-CM

## 2020-03-16 DIAGNOSIS — C50.911 MALIGNANT NEOPLASM OF RIGHT BREAST IN FEMALE, ESTROGEN RECEPTOR POSITIVE, UNSPECIFIED SITE OF BREAST: Primary | ICD-10-CM

## 2020-03-16 DIAGNOSIS — M81.0 SENILE OSTEOPOROSIS: ICD-10-CM

## 2020-03-16 DIAGNOSIS — M81.0 SENILE OSTEOPOROSIS: Primary | ICD-10-CM

## 2020-03-16 DIAGNOSIS — Z17.0 MALIGNANT NEOPLASM OF RIGHT BREAST IN FEMALE, ESTROGEN RECEPTOR POSITIVE, UNSPECIFIED SITE OF BREAST: Primary | ICD-10-CM

## 2020-03-16 PROCEDURE — 99999 PR PBB SHADOW E&M-EST. PATIENT-LVL IV: CPT | Mod: PBBFAC,,, | Performed by: NURSE PRACTITIONER

## 2020-03-16 PROCEDURE — 99214 OFFICE O/P EST MOD 30 MIN: CPT | Mod: PBBFAC,PN,25 | Performed by: NURSE PRACTITIONER

## 2020-03-16 PROCEDURE — 99999 PR PBB SHADOW E&M-EST. PATIENT-LVL IV: ICD-10-PCS | Mod: PBBFAC,,, | Performed by: NURSE PRACTITIONER

## 2020-03-16 PROCEDURE — 99214 PR OFFICE/OUTPT VISIT, EST, LEVL IV, 30-39 MIN: ICD-10-PCS | Mod: S$PBB,,, | Performed by: NURSE PRACTITIONER

## 2020-03-16 PROCEDURE — 63600175 PHARM REV CODE 636 W HCPCS: Mod: JG,PN | Performed by: NURSE PRACTITIONER

## 2020-03-16 PROCEDURE — 96372 THER/PROPH/DIAG INJ SC/IM: CPT | Mod: PN

## 2020-03-16 PROCEDURE — 99214 OFFICE O/P EST MOD 30 MIN: CPT | Mod: S$PBB,,, | Performed by: NURSE PRACTITIONER

## 2020-03-16 RX ORDER — CALCIUM CARBONATE 600 MG
600 TABLET ORAL DAILY
COMMUNITY
End: 2020-09-11 | Stop reason: ALTCHOICE

## 2020-03-16 RX ADMIN — DENOSUMAB 60 MG: 60 INJECTION SUBCUTANEOUS at 10:03

## 2020-03-16 NOTE — TELEPHONE ENCOUNTER
Attempted to call pt to reschedule f/u appt for prolia and breast check up.  No answer.  LM with instructions and to call if needed.  Office number provided.

## 2020-03-16 NOTE — PROGRESS NOTES
HISTORY OF PRESENT ILLNESS:  The patient is a 74-year-old white female known to Dr. Ricardo for Stage I (T1b, N0, M0) papillary right breast carcinoma.  Tumor was found to be ER/LA positive & Hxo1owx negative.  She is status post right mastectomy (11/16/17) for residual disease per Dr. Starr.  Oncotype DX did not have enough tissue for analysis.  She was started on Arimidex on her last visit (03/2018).  She received her 1st Prolia injection on 03/14/2018.  The patient also carries the diagnosis of Osteoporosis that was diagnosed in 2015.    She returns to clinic today for her 24 month post evaluation and next Prolia.  She reports occasional sweats that are tolerable.  She continues to work out 3 x's a week using weights and working in her yard.  She denies any difficulties with chest pain, nipple discharge, unbearable hot flashes, fevers, chills, abdominal discomfort, nausea, vomiting, diarrhea, bleeding, etc.   She denies any invasive dental work within the last 3 months.  Recent cleaning with no complications.  No new complaints or   pertinent findings on a 14-point review of systems.    PHYSICAL EXAMINATION:  GENERAL:  Well-developed, well-nourished elderly white female in no acute distress.  Alert & oriented x 3.  VITAL SIGNS:  Weight:  stable  Wt Readings from Last 3 Encounters:   03/16/20 69.2 kg (152 lb 8.9 oz)   02/19/20 67 kg (147 lb 11.3 oz)   12/12/19 68.7 kg (151 lb 7.3 oz)     Temp Readings from Last 3 Encounters:   03/16/20 97.7 °F (36.5 °C)   02/19/20 98.3 °F (36.8 °C) (Oral)   09/16/19 97.9 °F (36.6 °C)     BP Readings from Last 3 Encounters:   03/16/20 (!) 161/72   02/19/20 134/84   12/12/19 120/80     Pulse Readings from Last 3 Encounters:   03/16/20 (!) 56   02/19/20 63   12/12/19 60     HEENT:  Normocephalic, atraumatic.  Oral mucosa pink and moist.  Lips without lesions.  Tongue midline.  Oropharynx clear.  Nonicteric sclerae.   NECK:  Supple, no adenopathy.  No carotid bruits, thyromegaly or  thyroid nodule.  HEART:  Regular rate and rhythm without murmur, gallop or rub.                LUNGS:  Clear to auscultation bilaterally.  Normal respiratory effort.       ABDOMEN:  Soft, nontender, nondistended with positive normoactive bowel sounds, no hepatosplenomegaly.    EXTREMITIES:  No cyanosis, clubbing or edema.  Distal pulses are intact.  AXILLAE AND GROIN:  No palpable pathologic lymphadenopathy is appreciated.        SKIN:  Intact/turgor normal.  NEUROLOGIC:  Cranial nerves II-XII grossly intact.  Motor:  Good muscle bulk and tone.  Strength/sensory 5/5 throughout.  Gait stable.  BREASTS:  Right breast mastectomy with no signs of reoccurrence about the chest wall.  Left breast soft, free of masses, tenderness, nipple discharge or inversion.      LABORATORY:    Lab Results   Component Value Date    WBC 4.74 03/16/2020    RBC 4.03 03/16/2020    HGB 12.3 03/16/2020    HCT 37.5 03/16/2020    MCV 93 03/16/2020    MCH 30.5 03/16/2020    MCHC 32.8 03/16/2020    RDW 12.3 03/16/2020     03/16/2020    MPV 11.0 03/16/2020    GRAN 2.8 03/16/2020    GRAN 58.7 03/16/2020    LYMPH 1.4 03/16/2020    LYMPH 29.3 03/16/2020    MONO 0.5 03/16/2020    MONO 9.9 03/16/2020    EOS 0.1 03/16/2020    BASO 0.02 03/16/2020    EOSINOPHIL 1.5 03/16/2020    BASOPHIL 0.4 03/16/2020     CMP  Sodium   Date Value Ref Range Status   03/16/2020 140 136 - 145 mmol/L Final     Potassium   Date Value Ref Range Status   03/16/2020 4.4 3.5 - 5.1 mmol/L Final     Chloride   Date Value Ref Range Status   03/16/2020 105 95 - 110 mmol/L Final     CO2   Date Value Ref Range Status   03/16/2020 28 22 - 31 mmol/L Final     Glucose   Date Value Ref Range Status   03/16/2020 92 70 - 110 mg/dL Final     Comment:     The ADA recommends the following guidelines for fasting glucose:  Normal:       less than 100 mg/dL  Prediabetes:  100 mg/dL to 125 mg/dL  Diabetes:     126 mg/dL or higher       BUN, Bld   Date Value Ref Range Status   03/16/2020 21  (H) 7 - 18 mg/dL Final     Creatinine   Date Value Ref Range Status   03/16/2020 0.76 0.50 - 1.40 mg/dL Final     Calcium   Date Value Ref Range Status   03/16/2020 9.5 8.4 - 10.2 mg/dL Final     Total Protein   Date Value Ref Range Status   03/16/2020 7.2 6.0 - 8.4 g/dL Final     Albumin   Date Value Ref Range Status   03/16/2020 4.3 3.5 - 5.2 g/dL Final     Total Bilirubin   Date Value Ref Range Status   03/16/2020 0.3 0.2 - 1.3 mg/dL Final     Alkaline Phosphatase   Date Value Ref Range Status   03/16/2020 33 (L) 38 - 145 U/L Final     AST   Date Value Ref Range Status   03/16/2020 26 14 - 36 U/L Final     ALT   Date Value Ref Range Status   03/16/2020 21 0 - 35 U/L Final     Anion Gap   Date Value Ref Range Status   03/16/2020 7 (L) 8 - 16 mmol/L Final     eGFR if    Date Value Ref Range Status   03/16/2020 >60 >60 mL/min/1.73 m^2 Final     eGFR if non    Date Value Ref Range Status   03/16/2020 >60 >60 mL/min/1.73 m^2 Final     Comment:     Calculation used to obtain the estimated glomerular filtration  rate (eGFR) is the CKD-EPI equation.        LDH:  474  Vitamin D:  pending    RADIOLOGY:    Mammo, left - to be rescheduled in May/2020    BMD dated 12/12/2019:  Osteopenia with improved density in lumbar spine.    IMPRESSION:  1.  Stage I papillary right breast carcinoma, ER/TN positive - s/p right mastectomy 11/16/2017  2.  Senile osteoporosis - last BMD 12/12/2019  3.  Use of Arimidex  4.  Disorder of bone and cartilage    PLAN:  1.  Continue Arimidex 1 mg daily with a planned duration of 60 months; started 03/2018.  2.  Continue calcium supplementation with vitamin D.  3.  Proceed with Prolia 60 mg SQ today.  4.  Notify the clinic for any need of invasive dental work.  5.  Reschedule Mammogram, left until 05/2020 in light of precautions with COVID-19.  6.  Will call if Vitamin D levels low; post results to [facility].  5.  Return to clinic in 6 months for 30 month post  evaluation/next Prolia with interval BMP, Vitamin D, CXR, (reschedule Mammo, left in 05/2020) prior.  6.  Rx faxed for Bra/Prosthesis to [facility].    Assessment/plan reviewed and approved by Dr. Ricardo.

## 2020-04-13 DIAGNOSIS — J30.89 ALLERGIC RHINITIS DUE TO OTHER ALLERGIC TRIGGER, UNSPECIFIED SEASONALITY: ICD-10-CM

## 2020-04-13 RX ORDER — MONTELUKAST SODIUM 5 MG/1
10 TABLET, CHEWABLE ORAL NIGHTLY
Qty: 60 TABLET | Refills: 1 | Status: SHIPPED | OUTPATIENT
Start: 2020-04-13 | End: 2020-06-09

## 2020-04-13 RX ORDER — CETIRIZINE HYDROCHLORIDE 10 MG/1
10 TABLET ORAL DAILY
Qty: 30 TABLET | Refills: 1 | Status: SHIPPED | OUTPATIENT
Start: 2020-04-13 | End: 2020-06-08

## 2020-04-14 ENCOUNTER — TELEPHONE (OUTPATIENT)
Dept: HEMATOLOGY/ONCOLOGY | Facility: CLINIC | Age: 75
End: 2020-04-14

## 2020-04-14 NOTE — TELEPHONE ENCOUNTER
----- Message from Corey Ramirez sent at 4/14/2020 10:03 AM CDT -----  Contact: self   Patient want to push mammogram back to a later date please call back at [phone] (home)     Case number [id number]

## 2020-04-14 NOTE — TELEPHONE ENCOUNTER
Returned call to patient, no answer, left message on vm that I am moving mammogram to June 1 per her request.

## 2020-06-01 ENCOUNTER — HOSPITAL ENCOUNTER (OUTPATIENT)
Dept: RADIOLOGY | Facility: HOSPITAL | Age: 75
Discharge: HOME | End: 2020-06-01
Attending: NURSE PRACTITIONER
Payer: MEDICARE

## 2020-06-01 DIAGNOSIS — Z17.0 MALIGNANT NEOPLASM OF RIGHT BREAST IN FEMALE, ESTROGEN RECEPTOR POSITIVE, UNSPECIFIED SITE OF BREAST: ICD-10-CM

## 2020-06-01 DIAGNOSIS — C50.911 MALIGNANT NEOPLASM OF RIGHT BREAST IN FEMALE, ESTROGEN RECEPTOR POSITIVE, UNSPECIFIED SITE OF BREAST: ICD-10-CM

## 2020-06-01 PROCEDURE — 77065 DX MAMMO INCL CAD UNI: CPT | Mod: TC,PO,LT

## 2020-06-01 PROCEDURE — 77061 BREAST TOMOSYNTHESIS UNI: CPT | Mod: TC,PO,LT

## 2020-06-01 PROCEDURE — 77065 DX MAMMO INCL CAD UNI: CPT | Mod: 26,LT,, | Performed by: RADIOLOGY

## 2020-06-01 PROCEDURE — 77061 BREAST TOMOSYNTHESIS UNI: CPT | Mod: 26,LT,, | Performed by: RADIOLOGY

## 2020-06-01 PROCEDURE — 77065 MAMMO DIGITAL DIAGNOSTIC LEFT WITH TOMOSYNTHESIS_CAD: ICD-10-PCS | Mod: 26,LT,, | Performed by: RADIOLOGY

## 2020-06-01 PROCEDURE — 77061 MAMMO DIGITAL DIAGNOSTIC LEFT WITH TOMOSYNTHESIS_CAD: ICD-10-PCS | Mod: 26,LT,, | Performed by: RADIOLOGY

## 2020-07-15 ENCOUNTER — TELEPHONE (OUTPATIENT)
Dept: FAMILY MEDICINE | Facility: CLINIC | Age: 75
End: 2020-07-15

## 2020-08-20 ENCOUNTER — OFFICE VISIT (OUTPATIENT)
Dept: FAMILY MEDICINE | Facility: CLINIC | Age: 75
End: 2020-08-20
Payer: MEDICARE

## 2020-08-20 VITALS
WEIGHT: 154.75 LBS | TEMPERATURE: 98 F | HEART RATE: 58 BPM | HEIGHT: 66 IN | DIASTOLIC BLOOD PRESSURE: 76 MMHG | SYSTOLIC BLOOD PRESSURE: 136 MMHG | OXYGEN SATURATION: 94 % | BODY MASS INDEX: 24.87 KG/M2

## 2020-08-20 DIAGNOSIS — E03.9 ACQUIRED HYPOTHYROIDISM: ICD-10-CM

## 2020-08-20 DIAGNOSIS — E78.5 HYPERLIPIDEMIA, UNSPECIFIED HYPERLIPIDEMIA TYPE: Primary | ICD-10-CM

## 2020-08-20 PROCEDURE — 99214 OFFICE O/P EST MOD 30 MIN: CPT | Mod: PBBFAC,PO | Performed by: FAMILY MEDICINE

## 2020-08-20 PROCEDURE — 99214 PR OFFICE/OUTPT VISIT, EST, LEVL IV, 30-39 MIN: ICD-10-PCS | Mod: S$PBB,,, | Performed by: FAMILY MEDICINE

## 2020-08-20 PROCEDURE — 99999 PR PBB SHADOW E&M-EST. PATIENT-LVL IV: CPT | Mod: PBBFAC,,, | Performed by: FAMILY MEDICINE

## 2020-08-20 PROCEDURE — 99214 OFFICE O/P EST MOD 30 MIN: CPT | Mod: S$PBB,,, | Performed by: FAMILY MEDICINE

## 2020-08-20 PROCEDURE — 99999 PR PBB SHADOW E&M-EST. PATIENT-LVL IV: ICD-10-PCS | Mod: PBBFAC,,, | Performed by: FAMILY MEDICINE

## 2020-08-20 RX ORDER — LEVOTHYROXINE SODIUM 88 UG/1
88 TABLET ORAL DAILY
Qty: 90 TABLET | Refills: 3 | Status: SHIPPED | OUTPATIENT
Start: 2020-08-20 | End: 2021-01-12

## 2020-08-20 NOTE — PROGRESS NOTES
Subjective:       Patient ID: Sarahy Coronel is a 74 y.o. female.    Chief Complaint: 6 month f/u pure hypercholesterolemia    Here for f/u lipids and chronic issues. New issue of neck cramps off/on for several months.    Hyperlipidemia  This is a chronic problem. The current episode started more than 1 year ago. The problem is controlled. Pertinent negatives include no chest pain or shortness of breath.     Review of Systems   Constitutional: Negative for chills and fever.   Respiratory: Negative for cough, chest tightness and shortness of breath.    Cardiovascular: Negative for chest pain, palpitations and leg swelling.   Endocrine: Negative for cold intolerance and heat intolerance.   Psychiatric/Behavioral: Negative for decreased concentration. The patient is not nervous/anxious.        Objective:      Physical Exam  Vitals signs and nursing note reviewed.   Constitutional:       Appearance: She is well-developed.   HENT:      Head: Normocephalic and atraumatic.   Cardiovascular:      Rate and Rhythm: Normal rate and regular rhythm.      Heart sounds: Normal heart sounds.   Pulmonary:      Effort: Pulmonary effort is normal.      Breath sounds: Normal breath sounds.         Assessment:       1. Hyperlipidemia, unspecified hyperlipidemia type    2. Acquired hypothyroidism        Plan:       Hyperlipidemia, unspecified hyperlipidemia type  -     CBC auto differential; Future; Expected date: 08/20/2020  -     Lipid Panel; Future; Expected date: 08/20/2020  -     TSH; Future; Expected date: 08/20/2020    Acquired hypothyroidism  -     CBC auto differential; Future; Expected date: 08/20/2020  -     Lipid Panel; Future; Expected date: 08/20/2020  -     TSH; Future; Expected date: 08/20/2020    Other orders  -     SYNTHROID 88 mcg tablet; Take 1 tablet (88 mcg total) by mouth once daily.  Dispense: 90 tablet; Refill: 3    lipid stable but due for recheck  tsh update  She elects no prescription and will try otc tylenol  prn  Will monitor chronic medical issues and continue current plan of care.    Follow up in about 6 months (around 2/20/2021), or if symptoms worsen or fail to improve.

## 2020-09-11 ENCOUNTER — OFFICE VISIT (OUTPATIENT)
Dept: FAMILY MEDICINE | Facility: CLINIC | Age: 75
End: 2020-09-11
Payer: MEDICARE

## 2020-09-11 ENCOUNTER — LAB VISIT (OUTPATIENT)
Dept: LAB | Facility: HOSPITAL | Age: 75
End: 2020-09-11
Attending: NURSE PRACTITIONER
Payer: MEDICARE

## 2020-09-11 ENCOUNTER — HOSPITAL ENCOUNTER (OUTPATIENT)
Dept: RADIOLOGY | Facility: HOSPITAL | Age: 75
Discharge: HOME | End: 2020-09-11
Attending: NURSE PRACTITIONER
Payer: MEDICARE

## 2020-09-11 VITALS
HEIGHT: 66 IN | WEIGHT: 154.13 LBS | HEART RATE: 62 BPM | OXYGEN SATURATION: 98 % | SYSTOLIC BLOOD PRESSURE: 138 MMHG | BODY MASS INDEX: 24.77 KG/M2 | DIASTOLIC BLOOD PRESSURE: 72 MMHG

## 2020-09-11 DIAGNOSIS — E03.9 ACQUIRED HYPOTHYROIDISM: ICD-10-CM

## 2020-09-11 DIAGNOSIS — E78.5 HYPERLIPIDEMIA, UNSPECIFIED HYPERLIPIDEMIA TYPE: ICD-10-CM

## 2020-09-11 DIAGNOSIS — C50.911 MALIGNANT NEOPLASM OF RIGHT BREAST IN FEMALE, ESTROGEN RECEPTOR POSITIVE, UNSPECIFIED SITE OF BREAST: ICD-10-CM

## 2020-09-11 DIAGNOSIS — Z00.00 ENCOUNTER FOR PREVENTIVE HEALTH EXAMINATION: Primary | ICD-10-CM

## 2020-09-11 DIAGNOSIS — M89.9 DISORDER OF BONE AND CARTILAGE: ICD-10-CM

## 2020-09-11 DIAGNOSIS — Z17.0 MALIGNANT NEOPLASM OF RIGHT BREAST IN FEMALE, ESTROGEN RECEPTOR POSITIVE, UNSPECIFIED SITE OF BREAST: ICD-10-CM

## 2020-09-11 DIAGNOSIS — M94.9 DISORDER OF BONE AND CARTILAGE: ICD-10-CM

## 2020-09-11 LAB
ANION GAP SERPL CALC-SCNC: 11 MMOL/L (ref 8–16)
BASOPHILS # BLD AUTO: 0.02 K/UL (ref 0–0.2)
BASOPHILS NFR BLD: 0.4 % (ref 0–1.9)
BUN SERPL-MCNC: 17 MG/DL (ref 8–23)
CALCIUM SERPL-MCNC: 10 MG/DL (ref 8.7–10.5)
CHLORIDE SERPL-SCNC: 107 MMOL/L (ref 95–110)
CO2 SERPL-SCNC: 26 MMOL/L (ref 23–29)
CREAT SERPL-MCNC: 0.8 MG/DL (ref 0.5–1.4)
DIFFERENTIAL METHOD: ABNORMAL
EOSINOPHIL # BLD AUTO: 0.1 K/UL (ref 0–0.5)
EOSINOPHIL NFR BLD: 1.1 % (ref 0–8)
ERYTHROCYTE [DISTWIDTH] IN BLOOD BY AUTOMATED COUNT: 12.3 % (ref 11.5–14.5)
EST. GFR  (AFRICAN AMERICAN): >60 ML/MIN/1.73 M^2
EST. GFR  (NON AFRICAN AMERICAN): >60 ML/MIN/1.73 M^2
GLUCOSE SERPL-MCNC: 102 MG/DL (ref 70–110)
HCT VFR BLD AUTO: 37.4 % (ref 37–48.5)
HGB BLD-MCNC: 12.6 G/DL (ref 12–16)
IMM GRANULOCYTES # BLD AUTO: 0.02 K/UL (ref 0–0.04)
IMM GRANULOCYTES NFR BLD AUTO: 0.4 % (ref 0–0.5)
LYMPHOCYTES # BLD AUTO: 1.4 K/UL (ref 1–4.8)
LYMPHOCYTES NFR BLD: 26 % (ref 18–48)
MCH RBC QN AUTO: 31.3 PG (ref 27–31)
MCHC RBC AUTO-ENTMCNC: 33.7 G/DL (ref 32–36)
MCV RBC AUTO: 93 FL (ref 82–98)
MONOCYTES # BLD AUTO: 0.4 K/UL (ref 0.3–1)
MONOCYTES NFR BLD: 8 % (ref 4–15)
NEUTROPHILS # BLD AUTO: 3.5 K/UL (ref 1.8–7.7)
NEUTROPHILS NFR BLD: 64.1 % (ref 38–73)
NRBC BLD-RTO: 0 /100 WBC
PLATELET # BLD AUTO: 193 K/UL (ref 150–350)
PMV BLD AUTO: 10.8 FL (ref 9.2–12.9)
POTASSIUM SERPL-SCNC: 4.9 MMOL/L (ref 3.5–5.1)
RBC # BLD AUTO: 4.03 M/UL (ref 4–5.4)
SODIUM SERPL-SCNC: 144 MMOL/L (ref 136–145)
WBC # BLD AUTO: 5.47 K/UL (ref 3.9–12.7)

## 2020-09-11 PROCEDURE — 82306 VITAMIN D 25 HYDROXY: CPT

## 2020-09-11 PROCEDURE — G0439 PPPS, SUBSEQ VISIT: HCPCS | Mod: S$GLB,,, | Performed by: NURSE PRACTITIONER

## 2020-09-11 PROCEDURE — 99999 PR PBB SHADOW E&M-EST. PATIENT-LVL V: ICD-10-PCS | Mod: PBBFAC,,, | Performed by: NURSE PRACTITIONER

## 2020-09-11 PROCEDURE — 99999 PR PBB SHADOW E&M-EST. PATIENT-LVL V: CPT | Mod: PBBFAC,,, | Performed by: NURSE PRACTITIONER

## 2020-09-11 PROCEDURE — 85025 COMPLETE CBC W/AUTO DIFF WBC: CPT | Mod: PO

## 2020-09-11 PROCEDURE — 80048 BASIC METABOLIC PNL TOTAL CA: CPT | Mod: PO

## 2020-09-11 PROCEDURE — 99215 OFFICE O/P EST HI 40 MIN: CPT | Mod: PBBFAC,25,PO | Performed by: NURSE PRACTITIONER

## 2020-09-11 PROCEDURE — 71046 XR CHEST PA AND LATERAL: ICD-10-PCS | Mod: 26,,, | Performed by: RADIOLOGY

## 2020-09-11 PROCEDURE — 84443 ASSAY THYROID STIM HORMONE: CPT

## 2020-09-11 PROCEDURE — G0439 PR MEDICARE ANNUAL WELLNESS SUBSEQUENT VISIT: ICD-10-PCS | Mod: S$GLB,,, | Performed by: NURSE PRACTITIONER

## 2020-09-11 PROCEDURE — 71046 X-RAY EXAM CHEST 2 VIEWS: CPT | Mod: TC,FY,PO

## 2020-09-11 PROCEDURE — 80061 LIPID PANEL: CPT

## 2020-09-11 PROCEDURE — 36415 COLL VENOUS BLD VENIPUNCTURE: CPT | Mod: PO

## 2020-09-11 PROCEDURE — 71046 X-RAY EXAM CHEST 2 VIEWS: CPT | Mod: 26,,, | Performed by: RADIOLOGY

## 2020-09-11 RX ORDER — CALCIUM CARBONATE 600 MG
600 TABLET ORAL DAILY
COMMUNITY

## 2020-09-11 RX ORDER — ZINC GLUCONATE 50 MG
50 TABLET ORAL DAILY
COMMUNITY

## 2020-09-11 NOTE — PROGRESS NOTES
"Sarahy Coronel presented for a  Medicare AWV and comprehensive Health Risk Assessment today. The following components were reviewed and updated:    · Medical history  · Family History  · Social history  · Allergies and Current Medications  · Health Risk Assessment  · Health Maintenance  · Care Team     ** See Completed Assessments for Annual Wellness Visit within the encounter summary.**     The following assessments were completed:  · Living Situation  · CAGE  · Depression Screening  · Timed Get Up and Go  · Whisper Test  · Cognitive Function Screening      · Nutrition Screening  · ADL Screening  · PAQ Screening    Vitals:    09/11/20 1040   BP: 138/72   BP Location: Left arm   Patient Position: Sitting   BP Method: Medium (Manual)   Pulse: 62   SpO2: 98%   Weight: 69.9 kg (154 lb 1.6 oz)   Height: 5' 6" (1.676 m)     Body mass index is 24.87 kg/m².  Physical Exam  Vitals signs reviewed.   Constitutional:       Appearance: Normal appearance.   Cardiovascular:      Rate and Rhythm: Normal rate and regular rhythm.      Pulses: Normal pulses.      Heart sounds: Normal heart sounds.   Pulmonary:      Effort: Pulmonary effort is normal.      Breath sounds: Normal breath sounds.   Skin:     General: Skin is warm and dry.   Neurological:      Mental Status: She is alert and oriented to person, place, and time.           Diagnoses and health risks identified today and associated recommendations/orders:    1. Encounter for preventive health examination  Reviewed and discussed health maintenance.      2. Malignant neoplasm of right breast in female, estrogen receptor positive, unspecified site of breast  Stable- continue current treatment and follow up routinely with PCP and hem/onc    3. Acquired hypothyroidism  Stable- continue current treatment and follow up routinely with PCP     4. Hyperlipidemia, unspecified hyperlipidemia type  Stable- continue current treatment and follow up routinely with PCP     Ame Weathers with " a 5-10 year written screening schedule and personal prevention plan. Recommendations were developed using the USPSTF age appropriate recommendations. Education, counseling, and referrals were provided as needed. After Visit Summary printed and given to patient which includes a list of additional screenings\tests needed.    Mena Shea NP

## 2020-09-11 NOTE — PATIENT INSTRUCTIONS
Counseling and Referral of Other Preventative  (Italic type indicates deductible and co-insurance are waived)    Patient Name: Sarahy Coronel  Today's Date: 9/11/2020    Health Maintenance       Date Due Completion Date    Shingles Vaccine (1 of 2) 10/14/1997 ---    Influenza Vaccine (1) 08/01/2020 9/4/2019    Mammogram 06/01/2021 6/1/2020    DEXA SCAN 12/12/2022 12/12/2019    Lipid Panel 08/19/2024 8/19/2019    Colorectal Cancer Screening 09/18/2025 9/18/2015    Override on 1/9/2010: Done    TETANUS VACCINE 05/16/2027 5/16/2017        No orders of the defined types were placed in this encounter.    The following information is provided to all patients.  This information is to help you find resources for any of the problems found today that may be affecting your health:                Living healthy guide: www.[facility].[place].[facility]      Understanding Diabetes: www.diabetes.org      Eating healthy: www.cdc.gov/healthyweight      [facility] home safety checklist: www.cdc.gov/steadi/patient.html      Agency on Aging: www.goea.[place].[facility]      Alcoholics anonymous (AA): www.aa.org      Physical Activity: www.crystal.nih.gov/fr9suxo      Tobacco use: www.quitwithusla.org

## 2020-09-12 LAB
25(OH)D3+25(OH)D2 SERPL-MCNC: 43 NG/ML (ref 30–96)
CHOLEST SERPL-MCNC: 218 MG/DL (ref 120–199)
CHOLEST/HDLC SERPL: 3.5 {RATIO} (ref 2–5)
HDLC SERPL-MCNC: 63 MG/DL (ref 40–75)
HDLC SERPL: 28.9 % (ref 20–50)
LDLC SERPL CALC-MCNC: 128.2 MG/DL (ref 63–159)
NONHDLC SERPL-MCNC: 155 MG/DL
TRIGL SERPL-MCNC: 134 MG/DL (ref 30–150)
TSH SERPL DL<=0.005 MIU/L-ACNC: 1.4 UIU/ML (ref 0.4–4)

## 2020-09-16 ENCOUNTER — INFUSION (OUTPATIENT)
Dept: INFUSION THERAPY | Facility: HOSPITAL | Age: 75
End: 2020-09-16
Attending: NURSE PRACTITIONER
Payer: MEDICARE

## 2020-09-16 ENCOUNTER — OFFICE VISIT (OUTPATIENT)
Dept: HEMATOLOGY/ONCOLOGY | Facility: CLINIC | Age: 75
End: 2020-09-16
Payer: MEDICARE

## 2020-09-16 VITALS
HEART RATE: 60 BPM | RESPIRATION RATE: 18 BRPM | SYSTOLIC BLOOD PRESSURE: 147 MMHG | DIASTOLIC BLOOD PRESSURE: 66 MMHG | WEIGHT: 154.75 LBS | TEMPERATURE: 98 F | BODY MASS INDEX: 24.87 KG/M2 | HEIGHT: 66 IN

## 2020-09-16 VITALS
TEMPERATURE: 98 F | SYSTOLIC BLOOD PRESSURE: 147 MMHG | OXYGEN SATURATION: 96 % | WEIGHT: 154.75 LBS | DIASTOLIC BLOOD PRESSURE: 66 MMHG | HEART RATE: 60 BPM | BODY MASS INDEX: 24.98 KG/M2

## 2020-09-16 DIAGNOSIS — M81.0 SENILE OSTEOPOROSIS: Primary | ICD-10-CM

## 2020-09-16 DIAGNOSIS — Z79.811 USE OF ANASTROZOLE (ARIMIDEX): ICD-10-CM

## 2020-09-16 DIAGNOSIS — Z17.0 MALIGNANT NEOPLASM OF RIGHT BREAST IN FEMALE, ESTROGEN RECEPTOR POSITIVE, UNSPECIFIED SITE OF BREAST: Primary | ICD-10-CM

## 2020-09-16 DIAGNOSIS — M94.9 DISORDER OF BONE AND CARTILAGE: ICD-10-CM

## 2020-09-16 DIAGNOSIS — C50.911 MALIGNANT NEOPLASM OF RIGHT BREAST IN FEMALE, ESTROGEN RECEPTOR POSITIVE, UNSPECIFIED SITE OF BREAST: Primary | ICD-10-CM

## 2020-09-16 DIAGNOSIS — M89.9 DISORDER OF BONE AND CARTILAGE: ICD-10-CM

## 2020-09-16 DIAGNOSIS — M81.0 SENILE OSTEOPOROSIS: ICD-10-CM

## 2020-09-16 PROCEDURE — 99213 OFFICE O/P EST LOW 20 MIN: CPT | Mod: PBBFAC,PN,25 | Performed by: NURSE PRACTITIONER

## 2020-09-16 PROCEDURE — 96372 THER/PROPH/DIAG INJ SC/IM: CPT | Mod: PN

## 2020-09-16 PROCEDURE — 63600175 PHARM REV CODE 636 W HCPCS: Mod: JG,PN | Performed by: NURSE PRACTITIONER

## 2020-09-16 PROCEDURE — 99999 PR PBB SHADOW E&M-EST. PATIENT-LVL III: CPT | Mod: PBBFAC,,, | Performed by: NURSE PRACTITIONER

## 2020-09-16 PROCEDURE — 99214 PR OFFICE/OUTPT VISIT, EST, LEVL IV, 30-39 MIN: ICD-10-PCS | Mod: S$PBB,,, | Performed by: NURSE PRACTITIONER

## 2020-09-16 PROCEDURE — 99999 PR PBB SHADOW E&M-EST. PATIENT-LVL III: ICD-10-PCS | Mod: PBBFAC,,, | Performed by: NURSE PRACTITIONER

## 2020-09-16 PROCEDURE — 99214 OFFICE O/P EST MOD 30 MIN: CPT | Mod: S$PBB,,, | Performed by: NURSE PRACTITIONER

## 2020-09-16 RX ADMIN — DENOSUMAB 60 MG: 60 INJECTION SUBCUTANEOUS at 10:09

## 2020-09-16 NOTE — PROGRESS NOTES
HISTORY OF PRESENT ILLNESS:  The patient is a 74-year-old white female known to Dr. Ricardo for Stage I (T1b, N0, M0) papillary right breast carcinoma.  Tumor was found to be ER/HI positive & Wps4oiv negative.  She is status post right mastectomy (11/16/17) for residual disease per Dr. Starr.  Oncotype DX did not have enough tissue for analysis.  She was started on Arimidex on her last visit (03/2018).  She received her 1st Prolia injection on 03/14/2018.  The patient also carries the diagnosis of Osteoporosis that was diagnosed in 2015.    She returns to clinic today for her 30 month post evaluation and next Prolia.  She reports occasional sweats that are tolerable.  She continues to work in her yard. She has not gone to the gym due to COVID.  She denies any difficulties with chest pain, nipple discharge, unbearable hot flashes, fevers, chills, abdominal discomfort, nausea, vomiting, diarrhea, bleeding, etc.   She denies any invasive dental work within the last 3 months. No new complaints or pertinent findings on a 14-point review of systems.    PHYSICAL EXAMINATION:  GENERAL:  Well-developed, well-nourished elderly white female in no acute distress.  Alert & oriented x 3.  VITAL SIGNS:  Weight: Gain of 2 pounds in 6 months  Wt Readings from Last 3 Encounters:   09/16/20 70.2 kg (154 lb 12.2 oz)   09/11/20 69.9 kg (154 lb 1.6 oz)   08/20/20 70.2 kg (154 lb 12.2 oz)     Temp Readings from Last 3 Encounters:   09/16/20 97.6 °F (36.4 °C) (Temporal)   08/20/20 98.2 °F (36.8 °C) (Oral)   03/16/20 97.7 °F (36.5 °C)     BP Readings from Last 3 Encounters:   09/16/20 (!) 147/66   09/11/20 138/72   08/20/20 136/76     Pulse Readings from Last 3 Encounters:   09/16/20 60   09/11/20 62   08/20/20 (!) 58     HEENT:  Normocephalic, atraumatic.  Oral mucosa pink and moist.  Lips without lesions.  Tongue midline.  Oropharynx clear.  Nonicteric sclerae.   NECK:  Supple, no adenopathy.  No carotid bruits, thyromegaly or thyroid  nodule.  HEART:  Regular rate and rhythm without murmur, gallop or rub.                LUNGS:  Clear to auscultation bilaterally.  Normal respiratory effort.       ABDOMEN:  Soft, nontender, nondistended with positive normoactive bowel sounds, no hepatosplenomegaly.    EXTREMITIES:  No cyanosis, clubbing or edema.  Distal pulses are intact.  AXILLAE AND GROIN:  No palpable pathologic lymphadenopathy is appreciated.        SKIN:  Intact/turgor normal.  NEUROLOGIC:  Cranial nerves II-XII grossly intact.  Motor:  Good muscle bulk and tone.  Strength/sensory 5/5 throughout.  Gait stable.  BREASTS:  Right breast mastectomy with no signs of reoccurrence about the chest wall.  Left breast soft, free of masses, tenderness, nipple discharge or inversion.      LABORATORY:    Lab Results   Component Value Date    WBC 5.47 09/11/2020    RBC 4.03 09/11/2020    HGB 12.6 09/11/2020    HCT 37.4 09/11/2020    MCV 93 09/11/2020    MCH 31.3 (H) 09/11/2020    MCHC 33.7 09/11/2020    RDW 12.3 09/11/2020     09/11/2020    MPV 10.8 09/11/2020    GRAN 3.5 09/11/2020    GRAN 64.1 09/11/2020    LYMPH 1.4 09/11/2020    LYMPH 26.0 09/11/2020    MONO 0.4 09/11/2020    MONO 8.0 09/11/2020    EOS 0.1 09/11/2020    BASO 0.02 09/11/2020    EOSINOPHIL 1.1 09/11/2020    BASOPHIL 0.4 09/11/2020     BMP  Lab Results   Component Value Date     09/11/2020    K 4.9 09/11/2020     09/11/2020    CO2 26 09/11/2020    BUN 17 09/11/2020    CREATININE 0.8 09/11/2020    CALCIUM 10.0 09/11/2020    ANIONGAP 11 09/11/2020    ESTGFRAFRICA >60 09/11/2020    EGFRNONAA >60 09/11/2020     Vitamin D:  43    RADIOLOGY:  Mammo, left -06/01/2020L  Impression:  No mammographic evidence of malignancy.  BI-RADS Category 1: Negative  Recommendation:  Routine screening mammogram in 1 year is recommended    CXR dated 09/11/2020:  Impression:    No acute radiographic abnormality or interval detrimental change as compared to the prior study.     BMD dated  12/12/2019:  Osteopenia with improved density in lumbar spine.    IMPRESSION:  1.  Stage I papillary right breast carcinoma, ER/CA positive - s/p right mastectomy 11/16/2017; 30 month post  2.  Senile osteoporosis - last BMD 12/12/2019  3.  Use of Arimidex  4.  Disorder of bone and cartilage    PLAN:  1.  Continue Arimidex 1 mg daily with a planned duration of 60 months; started 03/2018; 60 months = 03/2023  2.  Continue calcium supplementation with vitamin D.  3.  Proceed with Prolia 60 mg SQ today.  4.  Notify the clinic for any need of invasive dental work.  5.  Return to clinic in 6 months for 36 month post evaluation/next Prolia with interval CBC, CMP, LDH, VIT D prior.      Assessment/plan reviewed and approved by Dr. Ricardo.

## 2020-09-16 NOTE — PLAN OF CARE
Problem: Adult Inpatient Plan of Care  Goal: Patient-Specific Goal (Individualization)  Outcome: Ongoing, Progressing  Flowsheets (Taken 9/16/2020 1034)  Individualized Care Needs: none  Anxieties, Fears or Concerns: none

## 2020-09-16 NOTE — Clinical Note
Proceed with Prolia today; f/u in 6 months with CBC, CMP, LDH, VIT D prior for evaluation 36 month post/Prolia

## 2021-01-11 ENCOUNTER — TELEPHONE (OUTPATIENT)
Dept: HEMATOLOGY/ONCOLOGY | Facility: CLINIC | Age: 76
End: 2021-01-11

## 2021-01-11 DIAGNOSIS — M81.0 SENILE OSTEOPOROSIS: ICD-10-CM

## 2021-01-11 DIAGNOSIS — C50.011 MALIGNANT NEOPLASM OF NIPPLE OF RIGHT BREAST IN FEMALE, UNSPECIFIED ESTROGEN RECEPTOR STATUS: ICD-10-CM

## 2021-01-11 RX ORDER — ANASTROZOLE 1 MG/1
1 TABLET ORAL DAILY
Qty: 90 TABLET | Refills: 3 | Status: SHIPPED | OUTPATIENT
Start: 2021-01-11 | End: 2021-12-10 | Stop reason: SDUPTHER

## 2021-01-12 ENCOUNTER — TELEPHONE (OUTPATIENT)
Dept: FAMILY MEDICINE | Facility: CLINIC | Age: 76
End: 2021-01-12

## 2021-01-12 RX ORDER — LEVOTHYROXINE SODIUM 88 UG/1
TABLET ORAL
Qty: 90 TABLET | Refills: 2 | Status: SHIPPED | OUTPATIENT
Start: 2021-01-12 | End: 2021-03-10 | Stop reason: SDUPTHER

## 2021-01-12 RX ORDER — ANASTROZOLE 1 MG/1
1 TABLET ORAL DAILY
Qty: 90 TABLET | Refills: 3 | OUTPATIENT
Start: 2021-01-12

## 2021-01-22 ENCOUNTER — PATIENT OUTREACH (OUTPATIENT)
Dept: ADMINISTRATIVE | Facility: HOSPITAL | Age: 76
End: 2021-01-22

## 2021-02-11 ENCOUNTER — TELEPHONE (OUTPATIENT)
Dept: FAMILY MEDICINE | Facility: CLINIC | Age: 76
End: 2021-02-11

## 2021-02-25 ENCOUNTER — TELEPHONE (OUTPATIENT)
Dept: FAMILY MEDICINE | Facility: CLINIC | Age: 76
End: 2021-02-25

## 2021-03-10 ENCOUNTER — OFFICE VISIT (OUTPATIENT)
Dept: FAMILY MEDICINE | Facility: CLINIC | Age: 76
End: 2021-03-10
Payer: MEDICARE

## 2021-03-10 VITALS
WEIGHT: 151.25 LBS | BODY MASS INDEX: 24.31 KG/M2 | DIASTOLIC BLOOD PRESSURE: 82 MMHG | HEIGHT: 66 IN | SYSTOLIC BLOOD PRESSURE: 138 MMHG | OXYGEN SATURATION: 98 % | HEART RATE: 73 BPM

## 2021-03-10 DIAGNOSIS — E03.8 OTHER SPECIFIED HYPOTHYROIDISM: ICD-10-CM

## 2021-03-10 DIAGNOSIS — J30.89 ALLERGIC RHINITIS DUE TO OTHER ALLERGIC TRIGGER, UNSPECIFIED SEASONALITY: ICD-10-CM

## 2021-03-10 DIAGNOSIS — Z17.0 MALIGNANT NEOPLASM OF RIGHT BREAST IN FEMALE, ESTROGEN RECEPTOR POSITIVE, UNSPECIFIED SITE OF BREAST: ICD-10-CM

## 2021-03-10 DIAGNOSIS — E78.2 MIXED HYPERLIPIDEMIA: ICD-10-CM

## 2021-03-10 DIAGNOSIS — Z00.00 ANNUAL PHYSICAL EXAM: Primary | ICD-10-CM

## 2021-03-10 DIAGNOSIS — C50.911 MALIGNANT NEOPLASM OF RIGHT BREAST IN FEMALE, ESTROGEN RECEPTOR POSITIVE, UNSPECIFIED SITE OF BREAST: ICD-10-CM

## 2021-03-10 PROCEDURE — 99999 PR PBB SHADOW E&M-EST. PATIENT-LVL IV: ICD-10-PCS | Mod: PBBFAC,,, | Performed by: NURSE PRACTITIONER

## 2021-03-10 PROCEDURE — 99397 PR PREVENTIVE VISIT,EST,65 & OVER: ICD-10-PCS | Mod: S$PBB,,, | Performed by: NURSE PRACTITIONER

## 2021-03-10 PROCEDURE — 99397 PER PM REEVAL EST PAT 65+ YR: CPT | Mod: S$PBB,,, | Performed by: NURSE PRACTITIONER

## 2021-03-10 PROCEDURE — 99999 PR PBB SHADOW E&M-EST. PATIENT-LVL IV: CPT | Mod: PBBFAC,,, | Performed by: NURSE PRACTITIONER

## 2021-03-10 PROCEDURE — 99214 OFFICE O/P EST MOD 30 MIN: CPT | Mod: PBBFAC,PO | Performed by: NURSE PRACTITIONER

## 2021-03-10 RX ORDER — LEVOTHYROXINE SODIUM 88 UG/1
88 TABLET ORAL DAILY
Qty: 90 TABLET | Refills: 1 | Status: SHIPPED | OUTPATIENT
Start: 2021-03-10 | End: 2021-09-08 | Stop reason: SDUPTHER

## 2021-03-10 RX ORDER — CETIRIZINE HYDROCHLORIDE 10 MG/1
10 TABLET ORAL DAILY
Qty: 90 TABLET | Refills: 1 | Status: SHIPPED | OUTPATIENT
Start: 2021-03-10 | End: 2021-10-04

## 2021-03-17 ENCOUNTER — INFUSION (OUTPATIENT)
Dept: INFUSION THERAPY | Facility: HOSPITAL | Age: 76
End: 2021-03-17
Attending: NURSE PRACTITIONER
Payer: MEDICARE

## 2021-03-17 ENCOUNTER — PATIENT MESSAGE (OUTPATIENT)
Dept: HEMATOLOGY/ONCOLOGY | Facility: CLINIC | Age: 76
End: 2021-03-17

## 2021-03-17 ENCOUNTER — OFFICE VISIT (OUTPATIENT)
Dept: HEMATOLOGY/ONCOLOGY | Facility: CLINIC | Age: 76
End: 2021-03-17
Payer: MEDICARE

## 2021-03-17 VITALS
HEART RATE: 60 BPM | DIASTOLIC BLOOD PRESSURE: 65 MMHG | SYSTOLIC BLOOD PRESSURE: 135 MMHG | BODY MASS INDEX: 24.06 KG/M2 | HEIGHT: 66 IN | OXYGEN SATURATION: 99 % | TEMPERATURE: 99 F | WEIGHT: 149.69 LBS | RESPIRATION RATE: 18 BRPM

## 2021-03-17 VITALS
SYSTOLIC BLOOD PRESSURE: 142 MMHG | HEART RATE: 58 BPM | TEMPERATURE: 98 F | RESPIRATION RATE: 18 BRPM | BODY MASS INDEX: 23.84 KG/M2 | DIASTOLIC BLOOD PRESSURE: 78 MMHG | WEIGHT: 147.69 LBS

## 2021-03-17 DIAGNOSIS — Z17.0 MALIGNANT NEOPLASM OF RIGHT BREAST IN FEMALE, ESTROGEN RECEPTOR POSITIVE, UNSPECIFIED SITE OF BREAST: Primary | ICD-10-CM

## 2021-03-17 DIAGNOSIS — C50.011 MALIGNANT NEOPLASM OF NIPPLE AND AREOLA, RIGHT FEMALE BREAST: ICD-10-CM

## 2021-03-17 DIAGNOSIS — C50.911 MALIGNANT NEOPLASM OF RIGHT BREAST IN FEMALE, ESTROGEN RECEPTOR POSITIVE, UNSPECIFIED SITE OF BREAST: Primary | ICD-10-CM

## 2021-03-17 DIAGNOSIS — M81.0 SENILE OSTEOPOROSIS: Primary | ICD-10-CM

## 2021-03-17 DIAGNOSIS — M81.0 OSTEOPOROSIS WITHOUT CURRENT PATHOLOGICAL FRACTURE, UNSPECIFIED OSTEOPOROSIS TYPE: ICD-10-CM

## 2021-03-17 PROCEDURE — 99214 OFFICE O/P EST MOD 30 MIN: CPT | Mod: S$PBB,,, | Performed by: NURSE PRACTITIONER

## 2021-03-17 PROCEDURE — 99999 PR PBB SHADOW E&M-EST. PATIENT-LVL IV: CPT | Mod: PBBFAC,,, | Performed by: NURSE PRACTITIONER

## 2021-03-17 PROCEDURE — 63600175 PHARM REV CODE 636 W HCPCS: Mod: JG,PN | Performed by: NURSE PRACTITIONER

## 2021-03-17 PROCEDURE — 99214 PR OFFICE/OUTPT VISIT, EST, LEVL IV, 30-39 MIN: ICD-10-PCS | Mod: S$PBB,,, | Performed by: NURSE PRACTITIONER

## 2021-03-17 PROCEDURE — 96372 THER/PROPH/DIAG INJ SC/IM: CPT | Mod: PN

## 2021-03-17 PROCEDURE — 99214 OFFICE O/P EST MOD 30 MIN: CPT | Mod: PBBFAC,PN | Performed by: NURSE PRACTITIONER

## 2021-03-17 PROCEDURE — 99999 PR PBB SHADOW E&M-EST. PATIENT-LVL IV: ICD-10-PCS | Mod: PBBFAC,,, | Performed by: NURSE PRACTITIONER

## 2021-03-17 RX ADMIN — DENOSUMAB 60 MG: 60 INJECTION SUBCUTANEOUS at 11:03

## 2021-04-09 ENCOUNTER — TELEPHONE (OUTPATIENT)
Dept: FAMILY MEDICINE | Facility: CLINIC | Age: 76
End: 2021-04-09

## 2021-06-14 ENCOUNTER — HOSPITAL ENCOUNTER (OUTPATIENT)
Dept: RADIOLOGY | Facility: HOSPITAL | Age: 76
Discharge: HOME | End: 2021-06-14
Attending: NURSE PRACTITIONER
Payer: MEDICARE

## 2021-06-14 DIAGNOSIS — C50.011 MALIGNANT NEOPLASM OF NIPPLE AND AREOLA, RIGHT FEMALE BREAST: ICD-10-CM

## 2021-06-14 DIAGNOSIS — Z17.0 MALIGNANT NEOPLASM OF RIGHT BREAST IN FEMALE, ESTROGEN RECEPTOR POSITIVE, UNSPECIFIED SITE OF BREAST: ICD-10-CM

## 2021-06-14 DIAGNOSIS — C50.911 MALIGNANT NEOPLASM OF RIGHT BREAST IN FEMALE, ESTROGEN RECEPTOR POSITIVE, UNSPECIFIED SITE OF BREAST: ICD-10-CM

## 2021-06-14 PROCEDURE — 77061 MAMMO DIGITAL DIAGNOSTIC LEFT WITH TOMO: ICD-10-PCS | Mod: 26,LT,, | Performed by: RADIOLOGY

## 2021-06-14 PROCEDURE — 77061 BREAST TOMOSYNTHESIS UNI: CPT | Mod: TC,PO,LT

## 2021-06-14 PROCEDURE — 71046 X-RAY EXAM CHEST 2 VIEWS: CPT | Mod: 26,,, | Performed by: RADIOLOGY

## 2021-06-14 PROCEDURE — 77065 MAMMO DIGITAL DIAGNOSTIC LEFT WITH TOMO: ICD-10-PCS | Mod: 26,LT,, | Performed by: RADIOLOGY

## 2021-06-14 PROCEDURE — 77065 DX MAMMO INCL CAD UNI: CPT | Mod: 26,LT,, | Performed by: RADIOLOGY

## 2021-06-14 PROCEDURE — 71046 XR CHEST PA AND LATERAL: ICD-10-PCS | Mod: 26,,, | Performed by: RADIOLOGY

## 2021-06-14 PROCEDURE — 77061 BREAST TOMOSYNTHESIS UNI: CPT | Mod: 26,LT,, | Performed by: RADIOLOGY

## 2021-06-14 PROCEDURE — 71046 X-RAY EXAM CHEST 2 VIEWS: CPT | Mod: TC,FY,PO

## 2021-06-17 ENCOUNTER — TELEPHONE (OUTPATIENT)
Dept: FAMILY MEDICINE | Facility: CLINIC | Age: 76
End: 2021-06-17

## 2021-07-01 ENCOUNTER — PATIENT MESSAGE (OUTPATIENT)
Dept: ADMINISTRATIVE | Facility: OTHER | Age: 76
End: 2021-07-01

## 2021-08-05 ENCOUNTER — LAB VISIT (OUTPATIENT)
Dept: LAB | Facility: HOSPITAL | Age: 76
End: 2021-08-05
Attending: FAMILY MEDICINE
Payer: MEDICARE

## 2021-08-05 DIAGNOSIS — E03.8 OTHER SPECIFIED HYPOTHYROIDISM: ICD-10-CM

## 2021-08-05 DIAGNOSIS — E78.2 MIXED HYPERLIPIDEMIA: ICD-10-CM

## 2021-08-05 LAB
CHOLEST SERPL-MCNC: 207 MG/DL (ref 120–199)
CHOLEST/HDLC SERPL: 3.3 {RATIO} (ref 2–5)
HDLC SERPL-MCNC: 63 MG/DL (ref 40–75)
HDLC SERPL: 30.4 % (ref 20–50)
LDLC SERPL CALC-MCNC: 129.8 MG/DL (ref 63–159)
NONHDLC SERPL-MCNC: 144 MG/DL
TRIGL SERPL-MCNC: 71 MG/DL (ref 30–150)
TSH SERPL DL<=0.005 MIU/L-ACNC: 0.54 UIU/ML (ref 0.4–4)

## 2021-08-05 PROCEDURE — 80061 LIPID PANEL: CPT | Performed by: NURSE PRACTITIONER

## 2021-08-05 PROCEDURE — 84443 ASSAY THYROID STIM HORMONE: CPT | Performed by: NURSE PRACTITIONER

## 2021-08-05 PROCEDURE — 36415 COLL VENOUS BLD VENIPUNCTURE: CPT | Mod: PO | Performed by: NURSE PRACTITIONER

## 2021-08-30 ENCOUNTER — PATIENT MESSAGE (OUTPATIENT)
Dept: INFUSION THERAPY | Facility: HOSPITAL | Age: 76
End: 2021-08-30

## 2021-09-07 NOTE — TELEPHONE ENCOUNTER
Cholesterol slightly up so work on diet/exercise; otherwise labs good   Island Pedicle Flap-Requiring Vessel Identification Text: The defect edges were debeveled with a #15 scalpel blade.  Given the location of the defect, shape of the defect and the proximity to free margins an island pedicle advancement flap was deemed most appropriate.  Using a sterile surgical marker, an appropriate advancement flap was drawn, based on the axial vessel mentioned above, incorporating the defect, outlining the appropriate donor tissue and placing the expected incisions within the relaxed skin tension lines where possible.    The area thus outlined was incised deep to adipose tissue with a #15 scalpel blade.  The skin margins were undermined to an appropriate distance in all directions around the primary defect and laterally outward around the island pedicle utilizing iris scissors.  There was minimal undermining beneath the pedicle flap.

## 2021-09-08 ENCOUNTER — TELEPHONE (OUTPATIENT)
Dept: FAMILY MEDICINE | Facility: CLINIC | Age: 76
End: 2021-09-08

## 2021-09-08 DIAGNOSIS — E03.8 OTHER SPECIFIED HYPOTHYROIDISM: ICD-10-CM

## 2021-09-08 RX ORDER — LEVOTHYROXINE SODIUM 88 UG/1
88 TABLET ORAL DAILY
Qty: 90 TABLET | Refills: 0 | Status: SHIPPED | OUTPATIENT
Start: 2021-09-08 | End: 2021-09-22

## 2021-09-16 ENCOUNTER — PATIENT MESSAGE (OUTPATIENT)
Dept: HEMATOLOGY/ONCOLOGY | Facility: CLINIC | Age: 76
End: 2021-09-16

## 2021-09-16 ENCOUNTER — TELEPHONE (OUTPATIENT)
Dept: HEMATOLOGY/ONCOLOGY | Facility: CLINIC | Age: 76
End: 2021-09-16

## 2021-09-17 ENCOUNTER — INFUSION (OUTPATIENT)
Dept: INFUSION THERAPY | Facility: HOSPITAL | Age: 76
End: 2021-09-17
Attending: INTERNAL MEDICINE
Payer: MEDICARE

## 2021-09-17 ENCOUNTER — OFFICE VISIT (OUTPATIENT)
Dept: HEMATOLOGY/ONCOLOGY | Facility: CLINIC | Age: 76
End: 2021-09-17
Payer: MEDICARE

## 2021-09-17 VITALS
SYSTOLIC BLOOD PRESSURE: 162 MMHG | WEIGHT: 149.06 LBS | HEIGHT: 66 IN | TEMPERATURE: 98 F | BODY MASS INDEX: 23.95 KG/M2 | HEART RATE: 71 BPM | OXYGEN SATURATION: 97 % | RESPIRATION RATE: 18 BRPM | DIASTOLIC BLOOD PRESSURE: 92 MMHG

## 2021-09-17 VITALS
OXYGEN SATURATION: 97 % | DIASTOLIC BLOOD PRESSURE: 86 MMHG | HEART RATE: 71 BPM | TEMPERATURE: 98 F | SYSTOLIC BLOOD PRESSURE: 158 MMHG | RESPIRATION RATE: 18 BRPM

## 2021-09-17 DIAGNOSIS — M81.0 OSTEOPOROSIS WITHOUT CURRENT PATHOLOGICAL FRACTURE, UNSPECIFIED OSTEOPOROSIS TYPE: Primary | ICD-10-CM

## 2021-09-17 DIAGNOSIS — M81.0 SENILE OSTEOPOROSIS: Primary | ICD-10-CM

## 2021-09-17 DIAGNOSIS — C50.911 MALIGNANT NEOPLASM OF RIGHT BREAST IN FEMALE, ESTROGEN RECEPTOR POSITIVE, UNSPECIFIED SITE OF BREAST: Primary | ICD-10-CM

## 2021-09-17 DIAGNOSIS — M81.0 SENILE OSTEOPOROSIS: ICD-10-CM

## 2021-09-17 DIAGNOSIS — Z79.811 USE OF ANASTROZOLE (ARIMIDEX): ICD-10-CM

## 2021-09-17 DIAGNOSIS — Z17.0 MALIGNANT NEOPLASM OF RIGHT BREAST IN FEMALE, ESTROGEN RECEPTOR POSITIVE, UNSPECIFIED SITE OF BREAST: Primary | ICD-10-CM

## 2021-09-17 PROCEDURE — 99214 OFFICE O/P EST MOD 30 MIN: CPT | Mod: S$PBB,,, | Performed by: NURSE PRACTITIONER

## 2021-09-17 PROCEDURE — 99214 OFFICE O/P EST MOD 30 MIN: CPT | Mod: PBBFAC,PN | Performed by: NURSE PRACTITIONER

## 2021-09-17 PROCEDURE — 63600175 PHARM REV CODE 636 W HCPCS: Mod: JG,PN | Performed by: NURSE PRACTITIONER

## 2021-09-17 PROCEDURE — 96372 THER/PROPH/DIAG INJ SC/IM: CPT | Mod: PN

## 2021-09-17 PROCEDURE — 99999 PR PBB SHADOW E&M-EST. PATIENT-LVL IV: CPT | Mod: PBBFAC,,, | Performed by: NURSE PRACTITIONER

## 2021-09-17 PROCEDURE — 99999 PR PBB SHADOW E&M-EST. PATIENT-LVL IV: ICD-10-PCS | Mod: PBBFAC,,, | Performed by: NURSE PRACTITIONER

## 2021-09-17 PROCEDURE — 99214 PR OFFICE/OUTPT VISIT, EST, LEVL IV, 30-39 MIN: ICD-10-PCS | Mod: S$PBB,,, | Performed by: NURSE PRACTITIONER

## 2021-09-17 RX ADMIN — DENOSUMAB 60 MG: 60 INJECTION SUBCUTANEOUS at 11:09

## 2021-09-20 DIAGNOSIS — E03.8 OTHER SPECIFIED HYPOTHYROIDISM: ICD-10-CM

## 2021-09-22 RX ORDER — LEVOTHYROXINE SODIUM 88 UG/1
TABLET ORAL
Qty: 90 TABLET | Refills: 0 | Status: SHIPPED | OUTPATIENT
Start: 2021-09-22 | End: 2021-10-04 | Stop reason: SDUPTHER

## 2021-09-27 ENCOUNTER — TELEPHONE (OUTPATIENT)
Dept: FAMILY MEDICINE | Facility: CLINIC | Age: 76
End: 2021-09-27

## 2021-10-04 ENCOUNTER — OFFICE VISIT (OUTPATIENT)
Dept: PRIMARY CARE CLINIC | Facility: CLINIC | Age: 76
End: 2021-10-04
Payer: MEDICARE

## 2021-10-04 VITALS
BODY MASS INDEX: 24.09 KG/M2 | HEART RATE: 52 BPM | OXYGEN SATURATION: 98 % | WEIGHT: 149.25 LBS | DIASTOLIC BLOOD PRESSURE: 70 MMHG | TEMPERATURE: 98 F | SYSTOLIC BLOOD PRESSURE: 146 MMHG

## 2021-10-04 DIAGNOSIS — E03.8 OTHER SPECIFIED HYPOTHYROIDISM: Primary | ICD-10-CM

## 2021-10-04 DIAGNOSIS — S89.91XA INJURY OF RIGHT KNEE, INITIAL ENCOUNTER: ICD-10-CM

## 2021-10-04 DIAGNOSIS — J30.89 ALLERGIC RHINITIS DUE TO OTHER ALLERGIC TRIGGER, UNSPECIFIED SEASONALITY: ICD-10-CM

## 2021-10-04 PROCEDURE — 99214 PR OFFICE/OUTPT VISIT, EST, LEVL IV, 30-39 MIN: ICD-10-PCS | Mod: S$GLB,,, | Performed by: NURSE PRACTITIONER

## 2021-10-04 PROCEDURE — 99214 OFFICE O/P EST MOD 30 MIN: CPT | Mod: S$GLB,,, | Performed by: NURSE PRACTITIONER

## 2021-10-04 RX ORDER — CETIRIZINE HYDROCHLORIDE 10 MG/1
10 TABLET ORAL
Qty: 90 TABLET | Refills: 1
Start: 2021-10-04 | End: 2023-08-07

## 2021-10-04 RX ORDER — LEVOTHYROXINE SODIUM 88 UG/1
88 TABLET ORAL DAILY
Qty: 90 TABLET | Refills: 3 | Status: SHIPPED | OUTPATIENT
Start: 2021-10-04 | End: 2022-03-14

## 2021-10-18 ENCOUNTER — TELEPHONE (OUTPATIENT)
Dept: FAMILY MEDICINE | Facility: CLINIC | Age: 76
End: 2021-10-18

## 2021-10-20 DIAGNOSIS — M25.569 KNEE PAIN, UNSPECIFIED CHRONICITY, UNSPECIFIED LATERALITY: ICD-10-CM

## 2021-10-20 DIAGNOSIS — S89.91XS: Primary | ICD-10-CM

## 2021-10-20 DIAGNOSIS — M23.51 CHRONIC INSTABILITY OF RIGHT KNEE: ICD-10-CM

## 2021-12-10 DIAGNOSIS — M81.0 SENILE OSTEOPOROSIS: ICD-10-CM

## 2021-12-10 DIAGNOSIS — C50.011 MALIGNANT NEOPLASM OF NIPPLE OF RIGHT BREAST IN FEMALE, UNSPECIFIED ESTROGEN RECEPTOR STATUS: ICD-10-CM

## 2021-12-10 RX ORDER — ANASTROZOLE 1 MG/1
1 TABLET ORAL DAILY
Qty: 90 TABLET | Refills: 3 | Status: SHIPPED | OUTPATIENT
Start: 2021-12-10 | End: 2022-04-01 | Stop reason: SDUPTHER

## 2022-02-09 ENCOUNTER — PES CALL (OUTPATIENT)
Dept: ADMINISTRATIVE | Facility: CLINIC | Age: 77
End: 2022-02-09
Payer: MEDICARE

## 2022-03-28 ENCOUNTER — HOSPITAL ENCOUNTER (OUTPATIENT)
Dept: RADIOLOGY | Facility: HOSPITAL | Age: 77
Discharge: HOME | End: 2022-03-28
Attending: NURSE PRACTITIONER
Payer: MEDICARE

## 2022-03-28 DIAGNOSIS — Z79.811 USE OF ANASTROZOLE (ARIMIDEX): ICD-10-CM

## 2022-03-28 DIAGNOSIS — M81.0 SENILE OSTEOPOROSIS: ICD-10-CM

## 2022-03-28 PROCEDURE — 77080 DXA BONE DENSITY AXIAL: CPT | Mod: 26,,, | Performed by: RADIOLOGY

## 2022-03-28 PROCEDURE — 77080 DEXA BONE DENSITY SPINE HIP: ICD-10-PCS | Mod: 26,,, | Performed by: RADIOLOGY

## 2022-03-28 PROCEDURE — 77080 DXA BONE DENSITY AXIAL: CPT | Mod: TC,PO

## 2022-04-01 ENCOUNTER — INFUSION (OUTPATIENT)
Dept: INFUSION THERAPY | Facility: HOSPITAL | Age: 77
End: 2022-04-01
Attending: NURSE PRACTITIONER
Payer: MEDICARE

## 2022-04-01 ENCOUNTER — OFFICE VISIT (OUTPATIENT)
Dept: HEMATOLOGY/ONCOLOGY | Facility: CLINIC | Age: 77
End: 2022-04-01
Payer: MEDICARE

## 2022-04-01 VITALS
BODY MASS INDEX: 24.24 KG/M2 | SYSTOLIC BLOOD PRESSURE: 153 MMHG | RESPIRATION RATE: 18 BRPM | HEIGHT: 66 IN | OXYGEN SATURATION: 99 % | TEMPERATURE: 96 F | DIASTOLIC BLOOD PRESSURE: 81 MMHG | WEIGHT: 150.81 LBS | HEART RATE: 61 BPM

## 2022-04-01 VITALS
HEART RATE: 61 BPM | TEMPERATURE: 96 F | BODY MASS INDEX: 24.24 KG/M2 | RESPIRATION RATE: 18 BRPM | HEIGHT: 66 IN | WEIGHT: 150.81 LBS | SYSTOLIC BLOOD PRESSURE: 153 MMHG | OXYGEN SATURATION: 99 % | DIASTOLIC BLOOD PRESSURE: 81 MMHG

## 2022-04-01 DIAGNOSIS — Z12.31 ENCOUNTER FOR SCREENING MAMMOGRAM FOR MALIGNANT NEOPLASM OF BREAST: ICD-10-CM

## 2022-04-01 DIAGNOSIS — M81.0 SENILE OSTEOPOROSIS: ICD-10-CM

## 2022-04-01 DIAGNOSIS — M81.0 SENILE OSTEOPOROSIS: Primary | ICD-10-CM

## 2022-04-01 DIAGNOSIS — C50.011 MALIGNANT NEOPLASM OF NIPPLE OF RIGHT BREAST IN FEMALE, UNSPECIFIED ESTROGEN RECEPTOR STATUS: ICD-10-CM

## 2022-04-01 DIAGNOSIS — Z17.0 MALIGNANT NEOPLASM OF RIGHT BREAST IN FEMALE, ESTROGEN RECEPTOR POSITIVE, UNSPECIFIED SITE OF BREAST: Primary | ICD-10-CM

## 2022-04-01 DIAGNOSIS — Z90.11 S/P RIGHT MASTECTOMY: ICD-10-CM

## 2022-04-01 DIAGNOSIS — Z79.811 USE OF ANASTROZOLE (ARIMIDEX): ICD-10-CM

## 2022-04-01 DIAGNOSIS — C50.911 MALIGNANT NEOPLASM OF RIGHT BREAST IN FEMALE, ESTROGEN RECEPTOR POSITIVE, UNSPECIFIED SITE OF BREAST: Primary | ICD-10-CM

## 2022-04-01 PROCEDURE — 99214 PR OFFICE/OUTPT VISIT, EST, LEVL IV, 30-39 MIN: ICD-10-PCS | Mod: S$PBB,,, | Performed by: NURSE PRACTITIONER

## 2022-04-01 PROCEDURE — 63600175 PHARM REV CODE 636 W HCPCS: Mod: JG,PN | Performed by: NURSE PRACTITIONER

## 2022-04-01 PROCEDURE — 99214 OFFICE O/P EST MOD 30 MIN: CPT | Mod: PBBFAC,PN | Performed by: NURSE PRACTITIONER

## 2022-04-01 PROCEDURE — 99999 PR PBB SHADOW E&M-EST. PATIENT-LVL IV: CPT | Mod: PBBFAC,,, | Performed by: NURSE PRACTITIONER

## 2022-04-01 PROCEDURE — 99999 PR PBB SHADOW E&M-EST. PATIENT-LVL IV: ICD-10-PCS | Mod: PBBFAC,,, | Performed by: NURSE PRACTITIONER

## 2022-04-01 PROCEDURE — 96372 THER/PROPH/DIAG INJ SC/IM: CPT | Mod: PN

## 2022-04-01 PROCEDURE — 99214 OFFICE O/P EST MOD 30 MIN: CPT | Mod: S$PBB,,, | Performed by: NURSE PRACTITIONER

## 2022-04-01 RX ORDER — ANASTROZOLE 1 MG/1
1 TABLET ORAL DAILY
Qty: 90 TABLET | Refills: 3 | Status: SHIPPED | OUTPATIENT
Start: 2022-04-01 | End: 2022-10-07 | Stop reason: SDUPTHER

## 2022-04-01 RX ADMIN — DENOSUMAB 60 MG: 60 INJECTION SUBCUTANEOUS at 11:04

## 2022-04-01 NOTE — PLAN OF CARE
Problem: Adult Inpatient Plan of Care  Goal: Plan of Care Review  Outcome: Ongoing, Progressing  Flowsheets (Taken 4/1/2022 1137)  Plan of Care Reviewed With: patient  Goal: Patient-Specific Goal (Individualized)  Outcome: Ongoing, Progressing     Problem: Electrolyte Imbalance  Goal: Electrolyte Balance  Outcome: Ongoing, Progressing   Pt tolerated prolia injection well.   No adverse reaction noted.  Pt left clinic in no acute distress.

## 2022-04-01 NOTE — PROGRESS NOTES
"CC:  48 month breast surveillance with Prolia    HISTORY OF PRESENT ILLNESS:    The patient is a 76-year-old white female known to Dr. Ricardo for Stage I (T1b, N0, M0) papillary right breast carcinoma.    Tumor was found to be ER/MN positive & [id number] negative.    She is status post right mastectomy (11/16/17) for residual disease per Dr. Starr.    Oncotype DX did not have enough tissue for analysis.    She was started on Arimidex on 03/2018.    She received her 1st Prolia injection on 03/14/2018.    The patient also carries the diagnosis of Osteoporosis that was diagnosed in 2015.    She returns to clinic today for her 48 month post breast evaluation and next Prolia. She continues to work in her yard and take care of her dog.  She remains active and well. She denies any invasive dental work in the last 3 months.    She denies any difficulties with chest pain, nipple discharge, unbearable hot flashes, fevers, chills, abdominal discomfort, nausea, vomiting, diarrhea, bleeding, etc.  No new complaints or pertinent findings on a 14-point review of systems.    PHYSICAL EXAMINATION:  GENERAL:  Well-developed, well-nourished elderly white female in no acute distress.  Alert & oriented x 3.  VITAL SIGNS:  Weight:  Gain of 1 1/2 pounds in 6 months  Vitals:    04/01/22 1042   BP: (!) 153/81   BP Location: Left arm   Patient Position: Sitting   BP Method: Medium (Automatic)   Pulse: 61   Resp: 18   Temp: 96.4 °F (35.8 °C)   TempSrc: Temporal   SpO2: 99%   Weight: 68.4 kg (150 lb 12.7 oz)   Height: 5' 6" (1.676 m)     HEENT:  Normocephalic, atraumatic.  Oral mucosa pink and moist.  Lips without lesions.  Tongue midline.  Oropharynx clear.  Nonicteric sclerae.   NECK:  Supple, no adenopathy.  No carotid bruits, thyromegaly or thyroid nodule.  HEART:  Regular rate and rhythm without murmur, gallop or rub.                LUNGS:  Clear to auscultation bilaterally.  Normal respiratory effort.       ABDOMEN:  Soft, nontender, " nondistended with positive normoactive bowel sounds, no hepatosplenomegaly.    EXTREMITIES:  No cyanosis, clubbing or edema.  Distal pulses are intact.  AXILLAE AND GROIN:  No palpable pathologic lymphadenopathy is appreciated.        SKIN:  Intact/turgor normal.  NEUROLOGIC:  Cranial nerves II-XII grossly intact.  Motor:  Good muscle bulk and tone.  Strength/sensory 5/5 throughout.  Gait stable.  BREASTS:  Right breast mastectomy with no signs of reoccurrence about the chest wall.  Left breast soft, free of masses, tenderness, nipple discharge or inversion.      LABORATORY:    Lab Results   Component Value Date    WBC 4.91 03/28/2022    RBC 4.04 03/28/2022    HGB 12.4 03/28/2022    HCT 36.9 (L) 03/28/2022    MCV 91 03/28/2022    MCH 30.7 03/28/2022    MCHC 33.6 03/28/2022    RDW 12.6 03/28/2022     03/28/2022    MPV 10.7 03/28/2022    GRAN 3.0 03/28/2022    GRAN 61.6 03/28/2022    LYMPH 1.4 03/28/2022    LYMPH 28.9 03/28/2022    MONO 0.4 03/28/2022    MONO 7.7 03/28/2022    EOS 0.1 03/28/2022    BASO 0.02 03/28/2022    EOSINOPHIL 1.2 03/28/2022    BASOPHIL 0.4 03/28/2022     CMP  Sodium   Date Value Ref Range Status   03/28/2022 142 136 - 145 mmol/L Final     Potassium   Date Value Ref Range Status   03/28/2022 4.7 3.5 - 5.1 mmol/L Final     Chloride   Date Value Ref Range Status   03/28/2022 105 95 - 110 mmol/L Final     CO2   Date Value Ref Range Status   03/28/2022 27 23 - 29 mmol/L Final     Glucose   Date Value Ref Range Status   03/28/2022 105 70 - 110 mg/dL Final     BUN   Date Value Ref Range Status   03/28/2022 18 8 - 23 mg/dL Final     Creatinine   Date Value Ref Range Status   03/28/2022 0.8 0.5 - 1.4 mg/dL Final     Calcium   Date Value Ref Range Status   03/28/2022 9.8 8.7 - 10.5 mg/dL Final     Total Protein   Date Value Ref Range Status   03/28/2022 7.3 6.0 - 8.4 g/dL Final     Albumin   Date Value Ref Range Status   03/28/2022 4.1 3.5 - 5.2 g/dL Final     Total Bilirubin   Date Value Ref  Range Status   03/28/2022 0.4 0.1 - 1.0 mg/dL Final     Comment:     For infants and newborns, interpretation of results should be based  on gestational age, weight and in agreement with clinical  observations.    Premature Infant recommended reference ranges:  Up to 24 hours.............<8.0 mg/dL  Up to 48 hours............<12.0 mg/dL  3-5 days..................<15.0 mg/dL  6-29 days.................<15.0 mg/dL       Alkaline Phosphatase   Date Value Ref Range Status   03/28/2022 46 (L) 55 - 135 U/L Final     AST   Date Value Ref Range Status   03/28/2022 17 10 - 40 U/L Final     ALT   Date Value Ref Range Status   03/28/2022 20 10 - 44 U/L Final     Anion Gap   Date Value Ref Range Status   03/28/2022 10 8 - 16 mmol/L Final     eGFR if    Date Value Ref Range Status   03/28/2022 >60 >60 mL/min/1.73 m^2 Final     eGFR if non    Date Value Ref Range Status   03/28/2022 >60 >60 mL/min/1.73 m^2 Final     Comment:     Calculation used to obtain the estimated glomerular filtration  rate (eGFR) is the CKD-EPI equation.        LDH:  204     RADIOLOGY:  Mammo, left -06/14/2021:  Impression:  No mammographic evidence of malignancy.  BI-RADS Category 1: Negative  Recommendation:  Routine screening mammogram in 1 year is recommended    CXR dated 06/14/21:  Impression:  Prior right mastectomy; Old healed left 4th & 5th rib fractures; otherwise, negative chest.    BMD dated 03/28/2022:  Osteopenia with improved density in lumbar spine & femoral neck.    IMPRESSION:  1.  Stage I papillary right breast carcinoma, ER/DE positive - s/p right mastectomy 11/16/2017; 48 month post - DEYVI  2.  Senile osteoporosis - improved to Osteopenia; on Prolia  3.  Use of Arimidex  4.  Disorder of bone and cartilage    PLAN:  1.  Continue Arimidex 1 mg daily with a planned duration of 60 months; started 03/2018; 60 months = 03/2023  2.  Continue calcium supplementation with vitamin D.  3.  Proceed with Prolia 60 mg  SQ today.  4.  Notify the clinic for any need of invasive dental work.  5.  Return to clinic in 6 months for 54 month post evaluation/next Prolia with interval BMP, Mammo, CXR prior      Assessment/plan reviewed and approved by Dr. Ricardo.    Route Chart for Scheduling    Med Onc Chart Routing      Follow up with physician    Follow up with GIOVANI 6 months. F/u in 6 months for 54 month breast surveillance/Prolia with BMP, Mammo, left & CXR prior   Labs    Imaging Mammogram   CXR   Pharmacy appointment    Other referrals            Therapy Plan Information  5. Medications  denosumab (PROLIA) injection 60 mg  60 mg, Subcutaneous, Every visit

## 2022-04-04 ENCOUNTER — TELEPHONE (OUTPATIENT)
Dept: FAMILY MEDICINE | Facility: CLINIC | Age: 77
End: 2022-04-04
Payer: MEDICARE

## 2022-04-04 NOTE — TELEPHONE ENCOUNTER
----- Message from Astrid Gusman sent at 4/4/2022 11:46 AM CDT -----  .Type: Patient Call Back    Who called: self     What is the request in detail: Pt is requesting to have her appointment rescheduled due to the bad weather for tomorrow    Can the clinic reply by [facility]?    Would the patient rather a call back or a response via [facility]? Call back     Best call back number: [phone]    Thank you

## 2022-04-05 ENCOUNTER — TELEPHONE (OUTPATIENT)
Dept: FAMILY MEDICINE | Facility: CLINIC | Age: 77
End: 2022-04-05
Payer: MEDICARE

## 2022-04-05 NOTE — TELEPHONE ENCOUNTER
----- Message from Daria Kane sent at 4/4/2022  4:45 PM CDT -----  Contact: Patient  Type: Patient Call Back         Who called: Patient         What is the request in detail: Reba Coronel calling back to resched her appt for tmrw due to weather; states there was an appt w/ Dr. Nogueira that was avail for May that she could take; please advise         Can the clinic reply by [facility]? Call back         Would the patient rather a call back or a response via [facility]? Call back         Best call back number: [phone]         Additional Information: she states the thinks it's for 5/19           Thank You

## 2022-04-05 NOTE — TELEPHONE ENCOUNTER
----- Message from Daria Kane sent at 4/4/2022  4:45 PM CDT -----  Contact: Patient  Type: Patient Call Back         Who called: Patient         What is the request in detail: Reba Coronel calling back to resched her appt for tmrw due to weather; states there was an appt w/ Dr. Nogueira that was avail for May that she could take; please advise         Can the clinic reply by [facility]? Call back         Would the patient rather a call back or a response via [facility]? Call back         Best call back number: [phone]         Additional Information: she states the thinks it's for 5/19           Thank You

## 2022-04-25 ENCOUNTER — OFFICE VISIT (OUTPATIENT)
Dept: FAMILY MEDICINE | Facility: CLINIC | Age: 77
End: 2022-04-25
Payer: MEDICARE

## 2022-04-25 VITALS
DIASTOLIC BLOOD PRESSURE: 72 MMHG | SYSTOLIC BLOOD PRESSURE: 112 MMHG | WEIGHT: 151.25 LBS | BODY MASS INDEX: 24.31 KG/M2 | HEIGHT: 66 IN | OXYGEN SATURATION: 98 % | HEART RATE: 71 BPM

## 2022-04-25 DIAGNOSIS — E78.5 HYPERLIPIDEMIA, UNSPECIFIED HYPERLIPIDEMIA TYPE: Primary | ICD-10-CM

## 2022-04-25 DIAGNOSIS — E03.8 OTHER SPECIFIED HYPOTHYROIDISM: ICD-10-CM

## 2022-04-25 DIAGNOSIS — K21.9 GASTROESOPHAGEAL REFLUX DISEASE WITHOUT ESOPHAGITIS: ICD-10-CM

## 2022-04-25 DIAGNOSIS — F51.01 PRIMARY INSOMNIA: ICD-10-CM

## 2022-04-25 PROCEDURE — 99213 OFFICE O/P EST LOW 20 MIN: CPT | Mod: PBBFAC,PO | Performed by: FAMILY MEDICINE

## 2022-04-25 PROCEDURE — 99999 PR PBB SHADOW E&M-EST. PATIENT-LVL III: CPT | Mod: PBBFAC,,, | Performed by: FAMILY MEDICINE

## 2022-04-25 PROCEDURE — 99213 PR OFFICE/OUTPT VISIT, EST, LEVL III, 20-29 MIN: ICD-10-PCS | Mod: S$PBB,,, | Performed by: FAMILY MEDICINE

## 2022-04-25 PROCEDURE — 99999 PR PBB SHADOW E&M-EST. PATIENT-LVL III: ICD-10-PCS | Mod: PBBFAC,,, | Performed by: FAMILY MEDICINE

## 2022-04-25 PROCEDURE — 99213 OFFICE O/P EST LOW 20 MIN: CPT | Mod: S$PBB,,, | Performed by: FAMILY MEDICINE

## 2022-04-25 RX ORDER — LEVOTHYROXINE SODIUM 88 UG/1
88 TABLET ORAL
Qty: 90 TABLET | Refills: 3 | Status: SHIPPED | OUTPATIENT
Start: 2022-04-25 | End: 2022-10-31 | Stop reason: SDUPTHER

## 2022-04-25 RX ORDER — FLUTICASONE PROPIONATE 50 MCG
1 SPRAY, SUSPENSION (ML) NASAL DAILY
Qty: 16 G | Refills: 1 | Status: SHIPPED | OUTPATIENT
Start: 2022-04-25 | End: 2022-08-23

## 2022-04-25 NOTE — PROGRESS NOTES
Subjective:       Patient ID: Sarahy Coronel is a 76 y.o. female.    Chief Complaint: Follow-up (Review labs, throat exam)    Here for f/u lipids and chronic issues. New issue of oab symptoms for last few months.  Doing well overall.   Not sleeping well for years.  Worse AR symptoms lately.    Review of Systems   Constitutional: Negative for chills and fever.   Respiratory: Negative for cough, chest tightness and shortness of breath.    Cardiovascular: Negative for chest pain, palpitations and leg swelling.   Endocrine: Negative for cold intolerance and heat intolerance.   Psychiatric/Behavioral: Negative for decreased concentration. The patient is not nervous/anxious.        Objective:      Physical Exam  Vitals and nursing note reviewed.   Constitutional:       Appearance: She is well-developed.   HENT:      Head: Normocephalic and atraumatic.   Cardiovascular:      Rate and Rhythm: Normal rate and regular rhythm.      Heart sounds: Normal heart sounds.   Pulmonary:      Effort: Pulmonary effort is normal.      Breath sounds: Normal breath sounds.   Psychiatric:         Mood and Affect: Mood normal.         Behavior: Behavior normal.         Assessment:       1. Hyperlipidemia, unspecified hyperlipidemia type    2. Other specified hypothyroidism    3. Gastroesophageal reflux disease without esophagitis    4. Primary insomnia        Plan:       Hyperlipidemia, unspecified hyperlipidemia type    Other specified hypothyroidism    Gastroesophageal reflux disease without esophagitis    Primary insomnia    Other orders  -     fluticasone propionate (FLONASE) 50 mcg/actuation nasal spray; 1 spray (50 mcg total) by Each Nostril route once daily.  Dispense: 16 g; Refill: 1  -     levothyroxine (SYNTHROID) 88 MCG tablet; Take 1 tablet (88 mcg total) by mouth before breakfast.  Dispense: 90 tablet; Refill: 3      Lipid stable  Hypothyroid stable  gerd stable  Not interested in medication currently  otc melatonin prn for  sleep  Will monitor chronic medical issues and continue current plan of care.  Trial generic synthroid due to cost  Follow up in about 6 months (around 10/25/2022), or if symptoms worsen or fail to improve.

## 2022-05-09 ENCOUNTER — PATIENT MESSAGE (OUTPATIENT)
Dept: SMOKING CESSATION | Facility: CLINIC | Age: 77
End: 2022-05-09
Payer: MEDICARE

## 2022-06-16 ENCOUNTER — HOSPITAL ENCOUNTER (OUTPATIENT)
Dept: RADIOLOGY | Facility: HOSPITAL | Age: 77
Discharge: HOME | End: 2022-06-16
Attending: NURSE PRACTITIONER
Payer: MEDICARE

## 2022-06-16 VITALS — WEIGHT: 151.25 LBS | HEIGHT: 66 IN | BODY MASS INDEX: 24.31 KG/M2

## 2022-06-16 DIAGNOSIS — Z17.0 MALIGNANT NEOPLASM OF RIGHT BREAST IN FEMALE, ESTROGEN RECEPTOR POSITIVE, UNSPECIFIED SITE OF BREAST: ICD-10-CM

## 2022-06-16 DIAGNOSIS — Z12.31 ENCOUNTER FOR SCREENING MAMMOGRAM FOR MALIGNANT NEOPLASM OF BREAST: ICD-10-CM

## 2022-06-16 DIAGNOSIS — C50.911 MALIGNANT NEOPLASM OF RIGHT BREAST IN FEMALE, ESTROGEN RECEPTOR POSITIVE, UNSPECIFIED SITE OF BREAST: ICD-10-CM

## 2022-06-16 DIAGNOSIS — Z90.11 S/P RIGHT MASTECTOMY: ICD-10-CM

## 2022-06-16 PROCEDURE — 71046 X-RAY EXAM CHEST 2 VIEWS: CPT | Mod: TC,FY,PO

## 2022-06-16 PROCEDURE — 77063 BREAST TOMOSYNTHESIS BI: CPT | Mod: 26,52,, | Performed by: RADIOLOGY

## 2022-06-16 PROCEDURE — 77063 BREAST TOMOSYNTHESIS BI: CPT | Mod: TC,PO

## 2022-06-16 PROCEDURE — 71046 XR CHEST PA AND LATERAL: ICD-10-PCS | Mod: 26,,, | Performed by: RADIOLOGY

## 2022-06-16 PROCEDURE — 77067 SCR MAMMO BI INCL CAD: CPT | Mod: 26,52,, | Performed by: RADIOLOGY

## 2022-06-16 PROCEDURE — 77067 MAMMO DIGITAL SCREENING LEFT WITH TOMO: ICD-10-PCS | Mod: 26,52,, | Performed by: RADIOLOGY

## 2022-06-16 PROCEDURE — 71046 X-RAY EXAM CHEST 2 VIEWS: CPT | Mod: 26,,, | Performed by: RADIOLOGY

## 2022-06-16 PROCEDURE — 77063 MAMMO DIGITAL SCREENING LEFT WITH TOMO: ICD-10-PCS | Mod: 26,52,, | Performed by: RADIOLOGY

## 2022-06-16 PROCEDURE — 77067 SCR MAMMO BI INCL CAD: CPT | Mod: TC,PO

## 2022-07-14 ENCOUNTER — TELEPHONE (OUTPATIENT)
Dept: FAMILY MEDICINE | Facility: CLINIC | Age: 77
End: 2022-07-14
Payer: MEDICARE

## 2022-07-14 NOTE — TELEPHONE ENCOUNTER
----- Message from Kellee Peterson sent at 7/14/2022  4:26 PM CDT -----  Who Called: Patient    What is the reqeust in detail: Requesting call back to have something called in for severe cold like symptoms with fever. Please advise.     PHARMACY: [facility]/pharmacy #[id number] - [place] - [address]  [address] [place] [address]  Phone: [phone] Fax: [phone]        Can the clinic reply by [facility]? No    Best Call Back Number: [phone]    Additional Information:

## 2022-07-15 ENCOUNTER — TELEPHONE (OUTPATIENT)
Dept: FAMILY MEDICINE | Facility: CLINIC | Age: 77
End: 2022-07-15
Payer: MEDICARE

## 2022-07-15 NOTE — TELEPHONE ENCOUNTER
----- Message from Samuel Campos sent at 7/15/2022 11:08 AM CDT -----  Contact: Patient  Type:  Patient Call          Who Called:PT         Does the patient know what this is regarding?: PT call in states that she tested for covid on today but she's been feeling bad since Tuesday on Tuesday tested she tested negative she been running fever fever been as high as 102.6 PT has virtual appt scheduled for tomorrow but don't know if she should keep it ; states that she would like a RX called in ; please advise           Would the patient rather a call back or a response via [facility]? Call           Best Call Back Number:[phone]             Additional Information:  [id number] - [place] - [address]  [address]  [place] [address]  Phone: [phone] Fax: [phone]

## 2022-08-18 ENCOUNTER — HOSPITAL ENCOUNTER (OUTPATIENT)
Dept: RADIOLOGY | Facility: CLINIC | Age: 77
Discharge: HOME | End: 2022-08-18
Attending: PODIATRIST
Payer: MEDICARE

## 2022-08-18 ENCOUNTER — OFFICE VISIT (OUTPATIENT)
Dept: PODIATRY | Facility: CLINIC | Age: 77
End: 2022-08-18
Payer: MEDICARE

## 2022-08-18 VITALS — BODY MASS INDEX: 24.27 KG/M2 | HEIGHT: 66 IN | WEIGHT: 151 LBS

## 2022-08-18 DIAGNOSIS — M20.42 HAMMER TOES OF BOTH FEET: ICD-10-CM

## 2022-08-18 DIAGNOSIS — M79.672 LEFT FOOT PAIN: ICD-10-CM

## 2022-08-18 DIAGNOSIS — M20.41 HAMMER TOES OF BOTH FEET: ICD-10-CM

## 2022-08-18 DIAGNOSIS — G57.92 NERVE ENTRAPMENT SYNDROME OF FOOT, LEFT: ICD-10-CM

## 2022-08-18 DIAGNOSIS — M20.12 HALLUX VALGUS OF LEFT FOOT: Primary | ICD-10-CM

## 2022-08-18 DIAGNOSIS — S93.529A SPRAIN, METATARSOPHALANGEAL JOINT, INITIAL ENCOUNTER: ICD-10-CM

## 2022-08-18 PROCEDURE — 99203 OFFICE O/P NEW LOW 30 MIN: CPT | Mod: S$GLB,,, | Performed by: PODIATRIST

## 2022-08-18 PROCEDURE — 73630 X-RAY EXAM OF FOOT: CPT | Mod: LT,S$GLB,, | Performed by: RADIOLOGY

## 2022-08-18 PROCEDURE — 73630 XR FOOT COMPLETE 3 VIEW LEFT: ICD-10-PCS | Mod: LT,S$GLB,, | Performed by: RADIOLOGY

## 2022-08-18 PROCEDURE — 99203 PR OFFICE/OUTPT VISIT, NEW, LEVL III, 30-44 MIN: ICD-10-PCS | Mod: S$GLB,,, | Performed by: PODIATRIST

## 2022-08-18 NOTE — PROGRESS NOTES
"  [address] [facility] [address]  [place] [address]  Phone: [phone]   [phone]    Patient's PCP:FREDRICK Nogueira MD  Referring Provider: Aaareferral Self    Subjective:      Chief Complaint:: Foot Pain (Left foot pain )    VIRA Coronel is a 76 y.o. female who presents today with a complaint of left foot pain and swelling lasting for about six months and reports no trauma.  Current symptoms include pain and swelling on top of left foot.  Patient states she feels stinging in her foot at times.  Aggravating factors are walking and standing for long periods of time. Symptoms have progressed. Treatment to date have included elevation.    Patient also complains of a corn on right second toe. Patient states she tries to keep it under control, but it still comes back.     Vitals:    22 0917   Weight: 68.5 kg (151 lb)   Height: 5' 6" (1.676 m)   PainSc:   7      Shoe Size: 10    Past Surgical History:   Procedure Laterality Date    APPENDECTOMY      BREAST SURGERY      CHOLECYSTECTOMY      COLONOSCOPY      ECTOPIC PREGNANCY SURGERY      removal of ovary and tube prior to hysterectomy    ESOPHAGOGASTRODUODENOSCOPY      HYSTERECTOMY      KNEE SURGERY      meniscal repair    LIPOMA RESECTION      left shoulder    TOTAL ABDOMINAL HYSTERECTOMY W/ BILATERAL SALPINGOOPHORECTOMY  's    after ectopic pregnancy     Past Medical History:   Diagnosis Date    Actinic keratoses     Allergy     Anticoagulant long-term use     asa 81    Arthritis     Breast cancer 2017    r mastectomy    Chronic bronchitis     DVT (deep venous thrombosis)     leg, no precipitating factors, no longer on anticoagulants    Encounter for blood transfusion     ectopic pregnancy    Family history of complications due to general anesthesia     sister's daughter almost  from MALIGNANT HYPERTHERMIA ( pt denies adverse reaction)    GERD (gastroesophageal reflux disease)     no medication    " Hypothyroidism     Leukopenia past Hx    Malignant hyperthermia     lyssa had a reaction to anesthesia- not sure if this was the same thing    Mitral regurgitation     mild,asymptomatic    Phlebitis     past Hx, legs    PONV (postoperative nausea and vomiting)     once only    Skin cancer     TMJ (dislocation of temporomandibular joint)      Family History   Problem Relation Age of Onset    Breast cancer Other 40    Heart disease Mother     Cancer Father     Bladder Cancer Father     Cancer Maternal Uncle     Cancer Paternal Uncle     Breast cancer Maternal Aunt         Social History:   Marital Status:   Alcohol History:  reports no history of alcohol use.  Tobacco History:  reports that she has never smoked. She has never used smokeless tobacco.  Drug History:  reports no history of drug use.    Review of patient's allergies indicates:   Allergen Reactions    Demerol (pf) [meperidine (pf)] Nausea And Vomiting    Erythromycin Other (See Comments)     Stomach cramps    Lorabid [loracarbef] Nausea And Vomiting    Penicillin g Hives    Fosamax [alendronate] Other (See Comments)     Bone pain       Current Outpatient Medications   Medication Sig Dispense Refill    anastrozole (ARIMIDEX) 1 mg Tab Take 1 tablet (1 mg total) by mouth once daily. 90 tablet 3    ascorbic acid, vitamin C, (VITAMIN C) 1000 MG tablet Take 1,000 mg by mouth once daily.      calcium carbonate (OS-KAYLIN) 600 mg calcium (1,500 mg) Tab Take 600 mg by mouth once daily.      cetirizine (ZYRTEC) 10 MG tablet Take 1 tablet (10 mg total) by mouth as needed for Allergies. For allergies 90 tablet 1    denosumab (PROLIA) 60 mg/mL Syrg Inject 60 mg into the skin every 6 (six) months.       ergocalciferol, vitamin D2, (VITAMIN D ORAL) Take 5,000 Units by mouth once daily.      fish oil-omega-3 fatty acids 300-1,000 mg capsule Take 1 capsule by mouth once daily.       fluticasone propionate (FLONASE) 50 mcg/actuation nasal  spray 1 spray (50 mcg total) by Each Nostril route once daily. 16 g 1    Lactobacillus rhamnosus GG (CULTURELLE) 10 billion cell capsule Take 1 capsule by mouth once daily.      levothyroxine (SYNTHROID) 88 MCG tablet Take 1 tablet (88 mcg total) by mouth before breakfast. 90 tablet 3    multivitamin capsule Take 1 capsule by mouth once daily.      zinc gluconate 50 mg tablet Take 50 mg by mouth once daily.       No current facility-administered medications for this visit.       Review of Systems      Objective:        Physical Exam:   Foot Exam    General  General Appearance: appears stated age and healthy   Orientation: alert and oriented to person, place, and time   Affect: appropriate   Gait: antalgic       Right Foot/Ankle     Inspection and Palpation  Ecchymosis: none  Tenderness: (Medial PIPJ 2nd toe by exostosis)  Swelling: (Minimal swelling PIPJ 2nd toe)  Arch: normal  Hammertoes: second toe (Exostosis medial PIPJ 2nd toe)  Hallux valgus: yes (Mild deformity)  Skin Exam: skin intact;   Neurovascular  Dorsalis pedis: 2+  Posterior tibial: 2+  Capillary Refill: 2+  Saphenous nerve sensation: normal  Tibial nerve sensation: normal  Superficial peroneal nerve sensation: normal  Deep peroneal nerve sensation: normal  Sural nerve sensation: normal    Muscle Strength  Ankle dorsiflexion: 5  Ankle plantar flexion: 5  Ankle inversion: 5  Ankle eversion: 5  Great toe extension: 5  Great toe flexion: 5    Range of Motion    Normal right ankle ROM      Left Foot/Ankle      Inspection and Palpation  Ecchymosis: none  Tenderness: great toe metatarsophalangeal joint and lesser metatarsophalangeal joints (Pain and swelling dorsal plantar 2nd and 3rd MPJ.)  Swelling: great toe metatarsophalangeal joint and lesser metatarsophalangeal joints (Swelling dorsal plantar 2nd and 3rd MPJ)  Arch: normal  Hammertoes: second toe and third toe (Toes are deviated medially at 2nd and 3rd MPJ)  Hallux valgus: yes  Skin Exam: skin  intact;   Neurovascular  Dorsalis pedis: 2+  Posterior tibial: 2+  Capillary refill: 2+  Saphenous nerve sensation: normal  Tibial nerve sensation: normal  Superficial peroneal nerve sensation: normal  Deep peroneal nerve sensation: normal  Sural nerve sensation: normal    Muscle Strength  Ankle dorsiflexion: 5  Ankle plantar flexion: 5  Ankle inversion: 5  Ankle eversion: 5  Great toe extension: 5  Great toe flexion: 5    Range of Motion    Normal left ankle ROM    Tests  PT Tinel's sign: negative  Mikal's sign: negative(Pain in 2nd intermetatarsal space consistent with nerve entrapment no true Mikal sign felt.  No mass effect felt)  Paresthesia: positive        Physical Exam  Cardiovascular:      Pulses:           Dorsalis pedis pulses are 2+ on the right side and 2+ on the left side.        Posterior tibial pulses are 2+ on the right side and 2+ on the left side.   Musculoskeletal:      Right foot: Bunion (Mild deformity) present.      Left foot: Bunion present.        Feet:                Right Ankle/Foot Exam     Range of Motion   The patient has normal right ankle ROM.    Left Ankle/Foot Exam     Swelling   The patient is swollen on the great toe metatarsophalangeal joint and lesser metatarsophalangeal joints.    Tenderness   The patient is tender to palpation of the great toe metatarsophalangeal joint and lesser metatarsophalangeal joints.    Range of Motion   The patient has normal left ankle ROM.     Muscle Strength   The patient has normal left ankle strength.      Muscle Strength   Right Lower Extremity   Ankle Dorsiflexion:  5   Plantar flexion:  5/5  Left Lower Extremity   Ankle Dorsiflexion:  5   Plantar flexion:  5/5     Reflexes     Left Side  Paresthesia: present    Vascular Exam     Right Pulses  Dorsalis Pedis:      2+  Posterior Tibial:      2+        Left Pulses  Dorsalis Pedis:      2+  Posterior Tibial:      2+             Imaging:   AP, lateral, lateral oblique weight-bearing x-rays left  foot:  No acute fractures, no bone tumors, no stress fractures, no soft tissue masses.  Bunion deformity present that is moderate in deformity with mild degenerative joint disease.  Long 2nd metatarsal with medial deviation 2nd and 3rd toes mild hammertoe deformity 2nd.  Second 3rd metatarsal heads touch each other from retrograde pressure.         Assessment:       1. Hallux valgus of left foot    2. Hammer toes of both feet    3. Nerve entrapment syndrome of foot, left    4. Sprain, metatarsophalangeal joint, initial encounter - Left Foot    5. Left foot pain      Plan:   Hallux valgus of left foot    Hammer toes of both feet    Nerve entrapment syndrome of foot, left    Sprain, metatarsophalangeal joint, initial encounter - Left Foot    Left foot pain  -     X-Ray Foot Complete Left    Evaluated patient had a long discussion with her about clinical findings of metatarsalgia inflammation 2nd 3rd MPJ and irritation nerve and 2nd intermetatarsal space.  I described her the problem consist of the fact she has a bunion which is deviated laterally causing her 2nd 3rd toes to be elevated and deviated medially.  Explained her that the metatarsal heads are abutting each other and she is carrying more weight on the 2nd and 3rd metatarsal head leading to the swelling and nerve irritation.  I explained her is no easy solution for this.  I reviewed her x-rays described the pathological findings with the bunion and deviation of the toes and length of the metatarsals.  I discussed conservative treatment consisting running shoes ice over-the-counter cross trainers by [employer] topical and oral anti-inflammatories and using pads to relieve pressure on the toes.  Explained her this is not curative but may give her some relief.  I explained to her unfortunately if he gets to be too uncomfortable she would have to consider having bunion surgery and toe surgery performed and possible osteotomy of metatarsals.  She will try conservative  care return as needed.      Procedures          Counseling:     I provided patient education verbally regarding:   Patient diagnosis, treatment options, as well as alternatives, risks, and benefits.     I provided patient education regarding: Bunion deformity and causes. I discussed conservative treatment with shoe modification, pads, treating symptoms with OTC NSAIDs, pads between toes, inserts and pads over the bunion. I explained that conservative treatment is non-curative but may help with pain and slow down the progression of the deformity.     I discussed hammertoe deformity and conservative treatment of deep, wider shoes, padding of the sore areas, OTC NSAID, skin softners, palliative care.  I discussed surgical procedures of fusion of the PIPJ of the toes with wires or implants, the follow up and the possible complications.    I explained what joint inflammation is and  conservative treatment of off loading the joint with OTC inserts and pads vs custom made orthotics.  The use of ice, heat, oral antiinflammatory and topical antiinflammatory and shoe modification as well as rest of the affected area with decrease walking, standing and non-impact exercising.    I discuss the the different stages of plantar tear and possible deformity subluxation of joint, dislocation of joint, increasing pain and deformity.  I discuss the conservative treatment of splinting the toe in rectus, off loading the joint with inserts but trying to avoid cortisone shot to the joint but occassionaly giving a shot of decadron with marcaine.  I discussed possibility of surgical repair if the toe becomes unstable and more deformed.  This note was created using Dragon voice recognition software that occasionally misinterpreted phrases or words.

## 2022-09-01 ENCOUNTER — PATIENT MESSAGE (OUTPATIENT)
Dept: HEMATOLOGY/ONCOLOGY | Facility: CLINIC | Age: 77
End: 2022-09-01
Payer: MEDICARE

## 2022-10-06 ENCOUNTER — TELEPHONE (OUTPATIENT)
Dept: HEMATOLOGY/ONCOLOGY | Facility: CLINIC | Age: 77
End: 2022-10-06
Payer: MEDICARE

## 2022-10-07 ENCOUNTER — TELEPHONE (OUTPATIENT)
Dept: HEMATOLOGY/ONCOLOGY | Facility: CLINIC | Age: 77
End: 2022-10-07

## 2022-10-07 ENCOUNTER — OFFICE VISIT (OUTPATIENT)
Dept: HEMATOLOGY/ONCOLOGY | Facility: CLINIC | Age: 77
End: 2022-10-07
Payer: MEDICARE

## 2022-10-07 ENCOUNTER — INFUSION (OUTPATIENT)
Dept: INFUSION THERAPY | Facility: HOSPITAL | Age: 77
End: 2022-10-07
Attending: NURSE PRACTITIONER
Payer: MEDICARE

## 2022-10-07 VITALS
BODY MASS INDEX: 24.17 KG/M2 | OXYGEN SATURATION: 100 % | WEIGHT: 150.38 LBS | TEMPERATURE: 98 F | HEART RATE: 54 BPM | SYSTOLIC BLOOD PRESSURE: 160 MMHG | HEIGHT: 66 IN | DIASTOLIC BLOOD PRESSURE: 70 MMHG | RESPIRATION RATE: 16 BRPM

## 2022-10-07 VITALS
HEART RATE: 54 BPM | SYSTOLIC BLOOD PRESSURE: 160 MMHG | RESPIRATION RATE: 16 BRPM | BODY MASS INDEX: 24.17 KG/M2 | WEIGHT: 150.38 LBS | OXYGEN SATURATION: 100 % | DIASTOLIC BLOOD PRESSURE: 70 MMHG | TEMPERATURE: 98 F | HEIGHT: 66 IN

## 2022-10-07 DIAGNOSIS — C50.011 MALIGNANT NEOPLASM OF NIPPLE OF RIGHT BREAST IN FEMALE, UNSPECIFIED ESTROGEN RECEPTOR STATUS: ICD-10-CM

## 2022-10-07 DIAGNOSIS — M81.0 SENILE OSTEOPOROSIS: Primary | ICD-10-CM

## 2022-10-07 DIAGNOSIS — Z90.11 H/O RIGHT MASTECTOMY: ICD-10-CM

## 2022-10-07 DIAGNOSIS — M81.0 SENILE OSTEOPOROSIS: ICD-10-CM

## 2022-10-07 DIAGNOSIS — Z79.811 USE OF ANASTROZOLE (ARIMIDEX): ICD-10-CM

## 2022-10-07 DIAGNOSIS — C50.911 MALIGNANT NEOPLASM OF RIGHT BREAST IN FEMALE, ESTROGEN RECEPTOR POSITIVE, UNSPECIFIED SITE OF BREAST: Primary | ICD-10-CM

## 2022-10-07 DIAGNOSIS — Z17.0 MALIGNANT NEOPLASM OF RIGHT BREAST IN FEMALE, ESTROGEN RECEPTOR POSITIVE, UNSPECIFIED SITE OF BREAST: Primary | ICD-10-CM

## 2022-10-07 PROCEDURE — 99214 OFFICE O/P EST MOD 30 MIN: CPT | Mod: PBBFAC,PN | Performed by: NURSE PRACTITIONER

## 2022-10-07 PROCEDURE — 96372 THER/PROPH/DIAG INJ SC/IM: CPT | Mod: PN

## 2022-10-07 PROCEDURE — 99214 OFFICE O/P EST MOD 30 MIN: CPT | Mod: S$PBB,,, | Performed by: NURSE PRACTITIONER

## 2022-10-07 PROCEDURE — 99214 PR OFFICE/OUTPT VISIT, EST, LEVL IV, 30-39 MIN: ICD-10-PCS | Mod: S$PBB,,, | Performed by: NURSE PRACTITIONER

## 2022-10-07 PROCEDURE — 63600175 PHARM REV CODE 636 W HCPCS: Mod: JG,PN | Performed by: NURSE PRACTITIONER

## 2022-10-07 PROCEDURE — 99999 PR PBB SHADOW E&M-EST. PATIENT-LVL IV: ICD-10-PCS | Mod: PBBFAC,,, | Performed by: NURSE PRACTITIONER

## 2022-10-07 PROCEDURE — 99999 PR PBB SHADOW E&M-EST. PATIENT-LVL IV: CPT | Mod: PBBFAC,,, | Performed by: NURSE PRACTITIONER

## 2022-10-07 RX ORDER — ANASTROZOLE 1 MG/1
1 TABLET ORAL DAILY
Qty: 90 TABLET | Refills: 1 | Status: SHIPPED | OUTPATIENT
Start: 2022-10-07 | End: 2023-04-06 | Stop reason: ALTCHOICE

## 2022-10-07 RX ADMIN — DENOSUMAB 60 MG: 60 INJECTION SUBCUTANEOUS at 11:10

## 2022-10-07 NOTE — PROGRESS NOTES
CC:  54 month breast surveillance with Prolia    HISTORY OF PRESENT ILLNESS:    The patient is a 76-year-old white female known to Dr. Ricardo for Stage I (T1b, N0, M0) papillary right breast carcinoma.    Tumor was found to be ER/PA positive & [id number] negative.    She is status post right mastectomy (17) for residual disease per Dr. Starr.    Oncotype DX did not have enough tissue for analysis.    She was started on Arimidex on 2018.    She received her 1st Prolia injection on 2018.    The patient also carries the diagnosis of Osteoporosis that was diagnosed in .    She returns to clinic today for her 54 month post breast evaluation and next Prolia.   She continues to work in her yard and take care of her dog.  She remains active and well. She denies any invasive dental work in the last 3 months.    She denies any difficulties with chest pain, nipple discharge, unbearable hot flashes, fevers, chills, abdominal discomfort, nausea, vomiting, diarrhea, bleeding, etc.    No new complaints or pertinent findings on a 14-point review of systems.    Past Medical History:   Diagnosis Date    Actinic keratoses     Allergy     Anticoagulant long-term use     asa 81    Arthritis     Breast cancer 2017    r mastectomy    Chronic bronchitis     DVT (deep venous thrombosis)     leg, no precipitating factors, no longer on anticoagulants    Encounter for blood transfusion     ectopic pregnancy    Family history of complications due to general anesthesia     sister's daughter almost  from MALIGNANT HYPERTHERMIA ( pt denies adverse reaction)    GERD (gastroesophageal reflux disease)     no medication    Hypothyroidism     Leukopenia past Hx    Malignant hyperthermia     neice had a reaction to anesthesia- not sure if this was the same thing    Mitral regurgitation     mild,asymptomatic    Phlebitis     past Hx, legs    PONV (postoperative nausea and vomiting)     once only    Skin cancer     TMJ  (dislocation of temporomandibular joint)      Past Surgical History:   Procedure Laterality Date    APPENDECTOMY      BREAST SURGERY      CHOLECYSTECTOMY      COLONOSCOPY      ECTOPIC PREGNANCY SURGERY      removal of ovary and tube prior to hysterectomy    ESOPHAGOGASTRODUODENOSCOPY      HYSTERECTOMY      KNEE SURGERY  8/14    meniscal repair    LIPOMA RESECTION      left shoulder    TOTAL ABDOMINAL HYSTERECTOMY W/ BILATERAL SALPINGOOPHORECTOMY  1970's    after ectopic pregnancy     Current Outpatient Medications on File Prior to Visit   Medication Sig Dispense Refill    anastrozole (ARIMIDEX) 1 mg Tab Take 1 tablet (1 mg total) by mouth once daily. 90 tablet 3    ascorbic acid, vitamin C, (VITAMIN C) 1000 MG tablet Take 1,000 mg by mouth once daily.      calcium carbonate (OS-KAYLIN) 600 mg calcium (1,500 mg) Tab Take 600 mg by mouth once daily.      denosumab (PROLIA) 60 mg/mL Syrg Inject 60 mg into the skin every 6 (six) months.       ergocalciferol, vitamin D2, (VITAMIN D ORAL) Take 5,000 Units by mouth once daily.      fish oil-omega-3 fatty acids 300-1,000 mg capsule Take 1 capsule by mouth once daily.       fluticasone propionate (FLONASE) 50 mcg/actuation nasal spray USE 1 SPRAY (50 MCG TOTAL) IN EACH NOSTRIL ONCE DAILY 16 mL 1    Lactobacillus rhamnosus GG (CULTURELLE) 10 billion cell capsule Take 1 capsule by mouth once daily.      levothyroxine (SYNTHROID) 88 MCG tablet Take 1 tablet (88 mcg total) by mouth before breakfast. 90 tablet 3    multivitamin capsule Take 1 capsule by mouth once daily.      zinc gluconate 50 mg tablet Take 50 mg by mouth once daily.      cetirizine (ZYRTEC) 10 MG tablet Take 1 tablet (10 mg total) by mouth as needed for Allergies. For allergies 90 tablet 1     No current facility-administered medications on file prior to visit.       PHYSICAL EXAMINATION:  GENERAL:  Well-developed, well-nourished elderly white female in no acute distress.  Alert & oriented x 3.  VITAL SIGNS:   "Weight:  stable  Vitals:    10/07/22 1038   BP: (!) 160/70   BP Location: Right arm   Patient Position: Sitting   BP Method: Medium (Manual)   Pulse: (!) 54   Resp: 16   Temp: 97.7 °F (36.5 °C)   TempSrc: Temporal   SpO2: 100%   Weight: 68.2 kg (150 lb 5.7 oz)   Height: 5' 6" (1.676 m)     HEENT:  Normocephalic, atraumatic.  Oral mucosa pink and moist.  Lips without lesions.  Tongue midline.  Oropharynx clear.  Nonicteric sclerae.   NECK:  Supple, no adenopathy.  No carotid bruits, thyromegaly or thyroid nodule.  HEART:  Regular rate and rhythm without murmur, gallop or rub.                LUNGS:  Clear to auscultation bilaterally.  Normal respiratory effort.       ABDOMEN:  Soft, nontender, nondistended with positive normoactive bowel sounds, no hepatosplenomegaly.    EXTREMITIES:  No cyanosis, clubbing or edema.  Distal pulses are intact.  AXILLAE AND GROIN:  No palpable pathologic lymphadenopathy is appreciated.        SKIN:  Intact/turgor normal.  NEUROLOGIC:  Cranial nerves II-XII grossly intact.  Motor:  Good muscle bulk and tone.  Strength/sensory 5/5 throughout.  Gait stable.  BREASTS:  Right breast mastectomy with no signs of reoccurrence about the chest wall.  Left breast soft, free of masses, tenderness, nipple discharge or inversion.      LABORATORY:    [facility]  Lab Results   Component Value Date     10/07/2022    K 5.2 (H) 10/07/2022     10/07/2022    CO2 27 10/07/2022    BUN 25 (H) 10/07/2022    CREATININE 0.8 10/07/2022    CALCIUM 9.9 10/07/2022    ANIONGAP 7 (L) 10/07/2022    ESTGFRAFRICA >60 03/28/2022    EGFRNONAA >60 03/28/2022     RADIOLOGY:  Mammo, left -06/16/2022:  Impression:  No mammographic evidence of malignancy.  BI-RADS Category 1: Negative  Recommendation:  Routine screening mammogram in 1 year is recommended    CXR dated 06/16/2022:  Impression:   Lungs are clear.  Normal size heart.  No pleural effusion or pneumothorax.  Cholecystectomy clips.  Several remote left rib " fractures.  Postsurgical change right breast.    BMD dated 03/28/2022:  Osteopenia with improved density in lumbar spine & femoral neck.    IMPRESSION:  1.  Stage I papillary right breast carcinoma, ER/WV positive - s/p right mastectomy 11/16/2017; 54 month post - DEYVI  2.  Senile osteoporosis - improved to Osteopenia; on Prolia  3.  Use of Arimidex  4.  Disorder of bone and cartilage    PLAN:  1.  Continue Arimidex 1 mg daily with a planned duration of 60 months; started 03/2018; 60 months = 03/2023  2.  Continue calcium supplementation with vitamin D.  3.  Proceed with Prolia 60 mg SQ today.  4.  Notify the clinic for any need of invasive dental work.  5.  Return to clinic in 6 months for 60 month post evaluation/next Prolia with interval CBC, CMP prior  6.  Rx Mastectomy bra given.    Assessment/plan reviewed and approved by Dr. Shah.    Route Chart for Scheduling    Med Onc Chart Routing      Follow up with physician    Follow up with GIOVANI 6 months. f/u in 6 months for 60 month breast surveillance/Prolia with CBC, CMP prior   Infusion scheduling note    Injection scheduling note Prolia today   Labs    Imaging    Pharmacy appointment    Other referrals          Therapy Plan Information  5. Medications  denosumab (PROLIA) injection 60 mg  60 mg, Subcutaneous, Every visit

## 2022-10-07 NOTE — PLAN OF CARE
Problem: Adult Inpatient Plan of Care  Goal: Plan of Care Review  Outcome: Ongoing, Progressing  Goal: Patient-Specific Goal (Individualized)  Outcome: Ongoing, Progressing  Goal: Absence of Hospital-Acquired Illness or Injury  Outcome: Ongoing, Progressing  Goal: Optimal Comfort and Wellbeing  Outcome: Ongoing, Progressing  Goal: Readiness for Transition of Care  Outcome: Ongoing, Progressing     Problem: Fatigue  Goal: Improved Activity Tolerance  Outcome: Ongoing, Progressing   .Patient tolerated prolia injection well, d/c in no acute distress.

## 2022-10-07 NOTE — TELEPHONE ENCOUNTER
----- Message from Sergei Kothari sent at 10/7/2022  3:55 PM CDT -----  Regarding: office notes  Type: Needs Medical Advice    Who Called:  Hailey with [employer]    Best Call Back Number: [phone]     Additional Information: pt was seen 10/07/22 they need office notes for visit to assist pt with DME supplies

## 2022-10-31 ENCOUNTER — OFFICE VISIT (OUTPATIENT)
Dept: PRIMARY CARE CLINIC | Facility: CLINIC | Age: 77
End: 2022-10-31
Payer: MEDICARE

## 2022-10-31 VITALS
HEIGHT: 66 IN | HEART RATE: 62 BPM | BODY MASS INDEX: 23.59 KG/M2 | OXYGEN SATURATION: 98 % | SYSTOLIC BLOOD PRESSURE: 142 MMHG | WEIGHT: 146.81 LBS | DIASTOLIC BLOOD PRESSURE: 70 MMHG

## 2022-10-31 DIAGNOSIS — E03.9 ACQUIRED HYPOTHYROIDISM: ICD-10-CM

## 2022-10-31 DIAGNOSIS — E78.2 MIXED HYPERLIPIDEMIA: ICD-10-CM

## 2022-10-31 DIAGNOSIS — Z00.00 ANNUAL PHYSICAL EXAM: Primary | ICD-10-CM

## 2022-10-31 DIAGNOSIS — E87.5 HYPERKALEMIA: ICD-10-CM

## 2022-10-31 PROCEDURE — 99397 PR PREVENTIVE VISIT,EST,65 & OVER: ICD-10-PCS | Mod: GZ,S$GLB,, | Performed by: NURSE PRACTITIONER

## 2022-10-31 PROCEDURE — 99397 PER PM REEVAL EST PAT 65+ YR: CPT | Mod: GZ,S$GLB,, | Performed by: NURSE PRACTITIONER

## 2022-10-31 RX ORDER — LEVOTHYROXINE SODIUM 88 UG/1
88 TABLET ORAL
Qty: 90 TABLET | Refills: 3 | Status: SHIPPED | OUTPATIENT
Start: 2022-10-31 | End: 2023-03-06 | Stop reason: SDUPTHER

## 2022-10-31 NOTE — PROGRESS NOTES
Subjective:       Patient ID: Sarahy Coronel is a 77 y.o. female.    Chief Complaint: Hypothyroidism (6 month follow up)/Annual  Last seen in primary care by Jero PCP on 04/25/2022.   HPI  Here for annual and follow up thyroid.  Vitals:    10/31/22 0946   BP: (!) 142/70   Pulse:      Review of Systems   Respiratory:  Negative for shortness of breath.    Cardiovascular:  Negative for leg swelling.     Objective:      Physical Exam  Vitals and nursing note reviewed.   Constitutional:       General: She is awake.      Appearance: Normal appearance. She is well-developed, well-groomed and normal weight.   HENT:      Head: Normocephalic and atraumatic.      Right Ear: Hearing, tympanic membrane, ear canal and external ear normal.      Left Ear: Hearing, tympanic membrane, ear canal and external ear normal.   Eyes:      General: Lids are normal.         Right eye: No foreign body or discharge.         Left eye: No foreign body or discharge.   Neck:      Trachea: Trachea and phonation normal.   Cardiovascular:      Rate and Rhythm: Normal rate and regular rhythm.      Heart sounds: Normal heart sounds.   Pulmonary:      Effort: Pulmonary effort is normal.      Breath sounds: Normal breath sounds and air entry.   Abdominal:      General: Abdomen is flat. Bowel sounds are normal.      Palpations: Abdomen is soft.      Tenderness: There is no abdominal tenderness.   Musculoskeletal:         General: Normal range of motion.      Cervical back: Full passive range of motion without pain, normal range of motion and neck supple.      Right lower leg: No edema.      Left lower leg: No edema.   Lymphadenopathy:      Head:      Right side of head: No submental, submandibular, tonsillar, preauricular, posterior auricular or occipital adenopathy.      Left side of head: No submental, submandibular, tonsillar, preauricular, posterior auricular or occipital adenopathy.   Skin:     General: Skin is warm and dry.      Findings: No rash.    Neurological:      General: No focal deficit present.      Mental Status: She is alert and oriented to person, place, and time.   Psychiatric:         Attention and Perception: Attention and perception normal.         Mood and Affect: Mood and affect normal.         Speech: Speech normal.         Behavior: Behavior normal. Behavior is cooperative.         Thought Content: Thought content normal.         Cognition and Memory: Cognition and memory normal.         Judgment: Judgment normal.       Assessment & Plan:       Annual physical exam    Acquired hypothyroidism  -     TSH; Future; Expected date: 04/29/2023  -     levothyroxine (SYNTHROID) 88 MCG tablet; Take 1 tablet (88 mcg total) by mouth before breakfast.  Dispense: 90 tablet; Refill: 3  Lab Results   Component Value Date    TSH 0.541 08/05/2021      Hyperkalemia  -     Potassium; Future; Expected date: 10/31/2022  Lab Results   Component Value Date    K 5.2 (H) 10/07/2022      Mixed hyperlipidemia  -     Lipid Panel; Future; Expected date: 10/31/2022        Lab Results   Component Value Date    CHOL 207 (H) 08/05/2021    CHOL 218 (H) 09/11/2020    CHOL 222 (H) 08/19/2019     Lab Results   Component Value Date    HDL 63 08/05/2021    HDL 63 09/11/2020    HDL 65 08/19/2019     Lab Results   Component Value Date    LDLCALC 129.8 08/05/2021    LDLCALC 128.2 09/11/2020    LDLCALC 129.4 08/19/2019     Lab Results   Component Value Date    TRIG 71 08/05/2021    TRIG 134 09/11/2020    TRIG 138 08/19/2019     Lab Results   Component Value Date    CHOLHDL 30.4 08/05/2021    CHOLHDL 28.9 09/11/2020    CHOLHDL 29.3 08/19/2019      Medication List with Changes/Refills   Current Medications    ANASTROZOLE (ARIMIDEX) 1 MG TAB    Take 1 tablet (1 mg total) by mouth once daily.    ASCORBIC ACID, VITAMIN C, (VITAMIN C) 1000 MG TABLET    Take 1,000 mg by mouth once daily.    CALCIUM CARBONATE (OS-KAYLIN) 600 MG CALCIUM (1,500 MG) TAB    Take 600 mg by mouth once daily.     CETIRIZINE (ZYRTEC) 10 MG TABLET    Take 1 tablet (10 mg total) by mouth as needed for Allergies. For allergies    DENOSUMAB (PROLIA) 60 MG/ML SYRG    Inject 60 mg into the skin every 6 (six) months.     ERGOCALCIFEROL, VITAMIN D2, (VITAMIN D ORAL)    Take 5,000 Units by mouth once daily.    FISH OIL-OMEGA-3 FATTY ACIDS 300-1,000 MG CAPSULE    Take 1 capsule by mouth once daily.     FLUTICASONE PROPIONATE (FLONASE) 50 MCG/ACTUATION NASAL SPRAY    USE 1 SPRAY (50 MCG TOTAL) IN EACH NOSTRIL ONCE DAILY    LACTOBACILLUS RHAMNOSUS GG (CULTURELLE) 10 BILLION CELL CAPSULE    Take 1 capsule by mouth once daily.    MULTIVITAMIN CAPSULE    Take 1 capsule by mouth once daily.    ZINC GLUCONATE 50 MG TABLET    Take 50 mg by mouth once daily.   Changed and/or Refilled Medications    Modified Medication Previous Medication    LEVOTHYROXINE (SYNTHROID) 88 MCG TABLET levothyroxine (SYNTHROID) 88 MCG tablet       Take 1 tablet (88 mcg total) by mouth before breakfast.    Take 1 tablet (88 mcg total) by mouth before breakfast.      No follow-ups on file.

## 2022-11-03 ENCOUNTER — LAB VISIT (OUTPATIENT)
Dept: LAB | Facility: CLINIC | Age: 77
End: 2022-11-03
Payer: MEDICARE

## 2022-11-03 DIAGNOSIS — E78.2 MIXED HYPERLIPIDEMIA: ICD-10-CM

## 2022-11-03 DIAGNOSIS — E03.9 ACQUIRED HYPOTHYROIDISM: ICD-10-CM

## 2022-11-03 DIAGNOSIS — E87.5 HYPERKALEMIA: ICD-10-CM

## 2022-11-03 LAB
CHOLEST SERPL-MCNC: 197 MG/DL (ref 120–199)
CHOLEST/HDLC SERPL: 3.2 {RATIO} (ref 2–5)
HDLC SERPL-MCNC: 62 MG/DL (ref 40–75)
HDLC SERPL: 31.5 % (ref 20–50)
LDLC SERPL CALC-MCNC: 126.6 MG/DL (ref 63–159)
NONHDLC SERPL-MCNC: 135 MG/DL
POTASSIUM SERPL-SCNC: 4.8 MMOL/L (ref 3.5–5.1)
TRIGL SERPL-MCNC: 42 MG/DL (ref 30–150)
TSH SERPL DL<=0.005 MIU/L-ACNC: 1 UIU/ML (ref 0.4–4)

## 2022-11-03 PROCEDURE — 36415 PR COLLECTION VENOUS BLOOD,VENIPUNCTURE: ICD-10-PCS | Mod: ,,, | Performed by: STUDENT IN AN ORGANIZED HEALTH CARE EDUCATION/TRAINING PROGRAM

## 2022-11-03 PROCEDURE — 84443 ASSAY THYROID STIM HORMONE: CPT | Performed by: NURSE PRACTITIONER

## 2022-11-03 PROCEDURE — 80061 LIPID PANEL: CPT | Performed by: NURSE PRACTITIONER

## 2022-11-03 PROCEDURE — 84132 ASSAY OF SERUM POTASSIUM: CPT | Performed by: NURSE PRACTITIONER

## 2022-11-03 PROCEDURE — 36415 COLL VENOUS BLD VENIPUNCTURE: CPT | Mod: ,,, | Performed by: STUDENT IN AN ORGANIZED HEALTH CARE EDUCATION/TRAINING PROGRAM

## 2022-11-11 ENCOUNTER — TELEPHONE (OUTPATIENT)
Dept: FAMILY MEDICINE | Facility: CLINIC | Age: 77
End: 2022-11-11
Payer: MEDICARE

## 2022-11-11 NOTE — TELEPHONE ENCOUNTER
----- Message from Kassandra Jenkins DNP, APRN sent at 11/11/2022 12:55 PM CST -----  Please notify all labs were normal and to keep her appt for follow up on 03/06/23

## 2022-11-26 RX ORDER — FLUTICASONE PROPIONATE 50 MCG
SPRAY, SUSPENSION (ML) NASAL
Qty: 48 G | Refills: 1 | Status: SHIPPED | OUTPATIENT
Start: 2022-11-26

## 2022-11-26 NOTE — TELEPHONE ENCOUNTER
No new care gaps identified.  [facility] Embedded Care Gaps. Reference number: [id number]. 11/26/2022   7:12:29 AM CST

## 2022-11-27 NOTE — TELEPHONE ENCOUNTER
Refill Decision Note   Sarahy Coronel  is requesting a refill authorization.  Brief Assessment and Rationale for Refill:  Approve     Medication Therapy Plan:       Medication Reconciliation Completed: No   Comments:     No Care Gaps recommended.     Note composed:10:10 PM 11/26/2022

## 2023-01-05 ENCOUNTER — PES CALL (OUTPATIENT)
Dept: ADMINISTRATIVE | Facility: CLINIC | Age: 78
End: 2023-01-05
Payer: MEDICARE

## 2023-03-03 ENCOUNTER — TELEPHONE (OUTPATIENT)
Dept: FAMILY MEDICINE | Facility: CLINIC | Age: 78
End: 2023-03-03
Payer: MEDICARE

## 2023-03-06 ENCOUNTER — OFFICE VISIT (OUTPATIENT)
Dept: PRIMARY CARE CLINIC | Facility: CLINIC | Age: 78
End: 2023-03-06
Payer: MEDICARE

## 2023-03-06 VITALS
DIASTOLIC BLOOD PRESSURE: 80 MMHG | RESPIRATION RATE: 18 BRPM | HEART RATE: 68 BPM | BODY MASS INDEX: 23.98 KG/M2 | SYSTOLIC BLOOD PRESSURE: 160 MMHG | TEMPERATURE: 98 F | OXYGEN SATURATION: 98 % | WEIGHT: 148.56 LBS

## 2023-03-06 DIAGNOSIS — M81.0 SENILE OSTEOPOROSIS: ICD-10-CM

## 2023-03-06 DIAGNOSIS — R07.89 CHEST WALL DISCOMFORT: ICD-10-CM

## 2023-03-06 DIAGNOSIS — K21.00 GASTROESOPHAGEAL REFLUX DISEASE WITH ESOPHAGITIS WITHOUT HEMORRHAGE: ICD-10-CM

## 2023-03-06 DIAGNOSIS — C50.011 MALIGNANT NEOPLASM OF NIPPLE OF RIGHT BREAST IN FEMALE, UNSPECIFIED ESTROGEN RECEPTOR STATUS: ICD-10-CM

## 2023-03-06 DIAGNOSIS — R03.0 ELEVATED BLOOD PRESSURE READING IN OFFICE WITHOUT DIAGNOSIS OF HYPERTENSION: Primary | ICD-10-CM

## 2023-03-06 DIAGNOSIS — E03.9 ACQUIRED HYPOTHYROIDISM: ICD-10-CM

## 2023-03-06 PROCEDURE — 99214 PR OFFICE/OUTPT VISIT, EST, LEVL IV, 30-39 MIN: ICD-10-PCS | Mod: S$GLB,,, | Performed by: NURSE PRACTITIONER

## 2023-03-06 PROCEDURE — 99214 OFFICE O/P EST MOD 30 MIN: CPT | Mod: S$GLB,,, | Performed by: NURSE PRACTITIONER

## 2023-03-06 RX ORDER — LEVOTHYROXINE SODIUM 88 UG/1
88 TABLET ORAL
Qty: 90 TABLET | Refills: 3 | Status: SHIPPED | OUTPATIENT
Start: 2023-03-06 | End: 2024-02-08 | Stop reason: SDUPTHER

## 2023-03-06 NOTE — PROGRESS NOTES
Subjective:       Patient ID: Sarahy Coronel is a 77 y.o. female.    Chief Complaint: Follow-up chronic medical diagnosis  Last seen in primary care by me on 10/31/2022  Follow-up   Hx of breast CA. Taking Arimidex with plans to stop soon. Pt is coming up on 5 years remission. Recent podiatry appt. Reports bone spur, corns and bunion. Surg not recommended to age. CP after working in yard. Pain relieved after belching. Reports midsternal pain radiating to back with nausea yesterday. Denies pain today. States was soreness afterward in mid sternal region. Denies any continued pain at this time.     Vitals:    03/06/23 1027   BP: (!) 160/80   Pulse:    Resp:    Temp:      Review of Systems   Constitutional:  Negative for appetite change.   Respiratory:  Positive for chest tightness (midsternal radiating to back. relieved after belching.).    Musculoskeletal:         Left foot pain with corns, bunion.        Last EKG 2017  Last echo 2012  Objective:      Physical Exam  Vitals and nursing note reviewed.   Constitutional:       General: She is awake.      Appearance: Normal appearance. She is well-developed and well-groomed.   HENT:      Head: Normocephalic and atraumatic.      Right Ear: Hearing and external ear normal.      Left Ear: Hearing and external ear normal.   Eyes:      General: Lids are normal.   Cardiovascular:      Rate and Rhythm: Normal rate and regular rhythm.   Pulmonary:      Effort: Pulmonary effort is normal.      Breath sounds: Normal breath sounds.   Musculoskeletal:      Cervical back: Full passive range of motion without pain, normal range of motion and neck supple.      Left foot: Bunion present.        Feet:    Feet:      Comments: Bandaids to [id number],4th, and 5th digit,corn to 5th digiit. She is using bandaid as padding to avoid toes pressint  Lymphadenopathy:      Head:      Right side of head: No submental, submandibular, tonsillar, preauricular, posterior auricular or occipital adenopathy.      " Left side of head: No submental, submandibular, tonsillar, preauricular, posterior auricular or occipital adenopathy.   Skin:     General: Skin is warm and dry.   Neurological:      General: No focal deficit present.      Mental Status: She is alert and oriented to person, place, and time.   Psychiatric:         Attention and Perception: Attention and perception normal.         Mood and Affect: Mood and affect normal.         Speech: Speech normal.         Behavior: Behavior normal. Behavior is cooperative.         Thought Content: Thought content normal.         Cognition and Memory: Cognition and memory normal.         Judgment: Judgment normal.       Assessment & Plan:       Elevated blood pressure reading in office without diagnosis of hypertension-   New diagnosis and readings appear to be progressively increasing  \Given BLOOD PRESSURE log to keep up with home readings  Closer follow up to determine need for medication  BP Readings from Last 3 Encounters:   03/06/23 (!) 160/80   10/31/22 (!) 142/70   10/07/22 (!) 160/70        Chest wall discomfort- Resolved, she related to working in yard    Gastroesophageal reflux disease with esophagitis without hemorrhage    Malignant neoplasm of nipple of right breast in female, unspecified estrogen receptor status- Currently on Arimidex and states expecting to be completing 5 year treatment this year, Stable    Acquired hypothyroidism- Stable levels  -     levothyroxine (SYNTHROID) 88 MCG tablet; Take 1 tablet (88 mcg total) by mouth before breakfast.  Dispense: 90 tablet; Refill: 3  Lab Results   Component Value Date    TSH 1.003 11/03/2022       Senile osteoporosis- Continues to be stable   Last density on 03/28/22 with osteopenia and increased bone density compared to prior test        She has been advised to go to ED at anytime when she his mid sternal chest pain and she states she understands but it is often relieved with belching and "getting rid of gas".  She has " been given a BLOOD PRESSURE log and instructed to take BLOOD PRESSURE daily and record to bring in at next visit.   Low Sodium dietary intake recommended    Medication List with Changes/Refills   Current Medications    ANASTROZOLE (ARIMIDEX) 1 MG TAB    Take 1 tablet (1 mg total) by mouth once daily.    ASCORBIC ACID, VITAMIN C, (VITAMIN C) 1000 MG TABLET    Take 1,000 mg by mouth once daily.    CALCIUM CARBONATE (OS-KAYLIN) 600 MG CALCIUM (1,500 MG) TAB    Take 600 mg by mouth once daily.    CETIRIZINE (ZYRTEC) 10 MG TABLET    Take 1 tablet (10 mg total) by mouth as needed for Allergies. For allergies    DENOSUMAB (PROLIA) 60 MG/ML SYRG    Inject 60 mg into the skin every 6 (six) months.     ERGOCALCIFEROL, VITAMIN D2, (VITAMIN D ORAL)    Take 5,000 Units by mouth once daily.    FISH OIL-OMEGA-3 FATTY ACIDS 300-1,000 MG CAPSULE    Take 1 capsule by mouth once daily.     FLUTICASONE PROPIONATE (FLONASE) 50 MCG/ACTUATION NASAL SPRAY    USE 1 SPRAY (50 MCG TOTAL) IN EACH NOSTRIL ONCE DAILY    LACTOBACILLUS RHAMNOSUS GG (CULTURELLE) 10 BILLION CELL CAPSULE    Take 1 capsule by mouth once daily.    MULTIVITAMIN CAPSULE    Take 1 capsule by mouth once daily.    ZINC GLUCONATE 50 MG TABLET    Take 50 mg by mouth once daily.   Changed and/or Refilled Medications    Modified Medication Previous Medication    LEVOTHYROXINE (SYNTHROID) 88 MCG TABLET levothyroxine (SYNTHROID) 88 MCG tablet       Take 1 tablet (88 mcg total) by mouth before breakfast.    Take 1 tablet (88 mcg total) by mouth before breakfast.      Follow up in about 4 weeks (around 4/3/2023).

## 2023-04-03 ENCOUNTER — OFFICE VISIT (OUTPATIENT)
Dept: PRIMARY CARE CLINIC | Facility: CLINIC | Age: 78
End: 2023-04-03
Payer: MEDICARE

## 2023-04-03 VITALS
BODY MASS INDEX: 24.16 KG/M2 | OXYGEN SATURATION: 98 % | RESPIRATION RATE: 18 BRPM | TEMPERATURE: 98 F | SYSTOLIC BLOOD PRESSURE: 138 MMHG | DIASTOLIC BLOOD PRESSURE: 70 MMHG | HEART RATE: 67 BPM | WEIGHT: 149.69 LBS

## 2023-04-03 DIAGNOSIS — I10 HYPERTENSION, ISOLATED SYSTOLIC: Primary | ICD-10-CM

## 2023-04-03 PROCEDURE — 99214 PR OFFICE/OUTPT VISIT, EST, LEVL IV, 30-39 MIN: ICD-10-PCS | Mod: S$GLB,,, | Performed by: NURSE PRACTITIONER

## 2023-04-03 PROCEDURE — 99214 OFFICE O/P EST MOD 30 MIN: CPT | Mod: S$GLB,,, | Performed by: NURSE PRACTITIONER

## 2023-04-03 NOTE — PROGRESS NOTES
Subjective:       Patient ID: Sarahy Coronel is a 77 y.o. female.    Chief Complaint: Follow-up  Last seen in primary care by me on 03/06/2023.  HPI  She is here to follow up with elevated blood pressure from her last visit.  Vitals:    04/03/23 0903   BP: 138/70   Pulse: 67   Resp: 18   Temp: 98.1 °F (36.7 °C)     BP Readings from Last 3 Encounters:   04/03/23 138/70   03/06/23 (!) 160/80   10/31/22 (!) 142/70      Review of Systems   Constitutional:  Negative for activity change.   Respiratory:  Negative for shortness of breath.    Cardiovascular:  Negative for chest pain and leg swelling.   Musculoskeletal: Negative.    Skin: Negative.    Neurological:  Positive for headaches (once with systolic blood pressure 160s.). Negative for dizziness.     The 10-year ASCVD risk score (Indira JOSE, et al., 2019) is: 18.4%    Values used to calculate the score:      Age: 77 years      Sex: Female      Is Non- : No      Diabetic: No      Tobacco smoker: No      Systolic Blood Pressure: 138 mmHg      Is BP treated: No      HDL Cholesterol: 62 mg/dL      Total Cholesterol: 197 mg/dL     I have offered her low dose thiazide diuretic(chlorthiladone and at this point)  Objective:      Physical Exam  Vitals and nursing note reviewed.   Constitutional:       General: She is awake.      Appearance: Normal appearance. She is well-developed and well-groomed.   HENT:      Head: Normocephalic and atraumatic.      Right Ear: Hearing normal.      Left Ear: Hearing normal.      Mouth/Throat:      Mouth: Mucous membranes are moist.   Cardiovascular:      Rate and Rhythm: Normal rate and regular rhythm.      Pulses: Normal pulses.      Heart sounds: Normal heart sounds.   Pulmonary:      Effort: Pulmonary effort is normal.      Breath sounds: Normal breath sounds.   Musculoskeletal:      Cervical back: Full passive range of motion without pain, normal range of motion and neck supple.   Neurological:      General: No focal  deficit present.      Mental Status: She is alert and oriented to person, place, and time.   Psychiatric:         Attention and Perception: Attention and perception normal.         Mood and Affect: Mood and affect normal.         Speech: Speech normal.         Behavior: Behavior normal. Behavior is cooperative.         Thought Content: Thought content normal.         Cognition and Memory: Cognition and memory normal.         Judgment: Judgment normal.       Assessment & Plan:       Hypertension, isolated systolic          Discussed isolated systolic HTN and the latest studies showing treating   She has chosen at this time to not begin any medication.at this time, even at a very low dose    Medication List with Changes/Refills   Current Medications    ANASTROZOLE (ARIMIDEX) 1 MG TAB    Take 1 tablet (1 mg total) by mouth once daily.    ASCORBIC ACID, VITAMIN C, (VITAMIN C) 1000 MG TABLET    Take 1,000 mg by mouth once daily.    CALCIUM CARBONATE (OS-KAYLIN) 600 MG CALCIUM (1,500 MG) TAB    Take 600 mg by mouth once daily.    CETIRIZINE (ZYRTEC) 10 MG TABLET    Take 1 tablet (10 mg total) by mouth as needed for Allergies. For allergies    DENOSUMAB (PROLIA) 60 MG/ML SYRG    Inject 60 mg into the skin every 6 (six) months.     ERGOCALCIFEROL, VITAMIN D2, (VITAMIN D ORAL)    Take 5,000 Units by mouth once daily.    FISH OIL-OMEGA-3 FATTY ACIDS 300-1,000 MG CAPSULE    Take 1 capsule by mouth once daily.     FLUTICASONE PROPIONATE (FLONASE) 50 MCG/ACTUATION NASAL SPRAY    USE 1 SPRAY (50 MCG TOTAL) IN EACH NOSTRIL ONCE DAILY    LACTOBACILLUS RHAMNOSUS GG (CULTURELLE) 10 BILLION CELL CAPSULE    Take 1 capsule by mouth once daily.    LEVOTHYROXINE (SYNTHROID) 88 MCG TABLET    Take 1 tablet (88 mcg total) by mouth before breakfast.    MULTIVITAMIN CAPSULE    Take 1 capsule by mouth once daily.    ZINC GLUCONATE 50 MG TABLET    Take 50 mg by mouth once daily.      Follow up in about 4 months (around 8/3/2023).

## 2023-04-06 ENCOUNTER — OFFICE VISIT (OUTPATIENT)
Dept: HEMATOLOGY/ONCOLOGY | Facility: CLINIC | Age: 78
End: 2023-04-06
Payer: MEDICARE

## 2023-04-06 ENCOUNTER — LAB VISIT (OUTPATIENT)
Dept: LAB | Facility: HOSPITAL | Age: 78
End: 2023-04-06
Attending: NURSE PRACTITIONER
Payer: MEDICARE

## 2023-04-06 VITALS
TEMPERATURE: 97 F | RESPIRATION RATE: 16 BRPM | HEART RATE: 57 BPM | HEIGHT: 66 IN | SYSTOLIC BLOOD PRESSURE: 153 MMHG | OXYGEN SATURATION: 100 % | DIASTOLIC BLOOD PRESSURE: 70 MMHG | BODY MASS INDEX: 24.1 KG/M2 | WEIGHT: 149.94 LBS

## 2023-04-06 DIAGNOSIS — Z90.11 S/P RIGHT MASTECTOMY: ICD-10-CM

## 2023-04-06 DIAGNOSIS — M89.9 DISORDER OF BONE AND CARTILAGE: ICD-10-CM

## 2023-04-06 DIAGNOSIS — Z17.0 MALIGNANT NEOPLASM OF RIGHT BREAST IN FEMALE, ESTROGEN RECEPTOR POSITIVE, UNSPECIFIED SITE OF BREAST: ICD-10-CM

## 2023-04-06 DIAGNOSIS — Z12.31 ENCOUNTER FOR SCREENING MAMMOGRAM FOR MALIGNANT NEOPLASM OF BREAST: ICD-10-CM

## 2023-04-06 DIAGNOSIS — Z17.0 MALIGNANT NEOPLASM OF RIGHT BREAST IN FEMALE, ESTROGEN RECEPTOR POSITIVE, UNSPECIFIED SITE OF BREAST: Primary | ICD-10-CM

## 2023-04-06 DIAGNOSIS — C50.911 MALIGNANT NEOPLASM OF RIGHT BREAST IN FEMALE, ESTROGEN RECEPTOR POSITIVE, UNSPECIFIED SITE OF BREAST: ICD-10-CM

## 2023-04-06 DIAGNOSIS — C50.911 MALIGNANT NEOPLASM OF RIGHT BREAST IN FEMALE, ESTROGEN RECEPTOR POSITIVE, UNSPECIFIED SITE OF BREAST: Primary | ICD-10-CM

## 2023-04-06 DIAGNOSIS — M94.9 DISORDER OF BONE AND CARTILAGE: ICD-10-CM

## 2023-04-06 LAB
ALBUMIN SERPL BCP-MCNC: 3.9 G/DL (ref 3.5–5.2)
ALP SERPL-CCNC: 41 U/L (ref 55–135)
ALT SERPL W/O P-5'-P-CCNC: 15 U/L (ref 10–44)
ANION GAP SERPL CALC-SCNC: 6 MMOL/L (ref 8–16)
AST SERPL-CCNC: 16 U/L (ref 10–40)
BASOPHILS # BLD AUTO: 0.02 K/UL (ref 0–0.2)
BASOPHILS NFR BLD: 0.4 % (ref 0–1.9)
BILIRUB SERPL-MCNC: 0.4 MG/DL (ref 0.1–1)
BUN SERPL-MCNC: 24 MG/DL (ref 8–23)
CALCIUM SERPL-MCNC: 10.1 MG/DL (ref 8.7–10.5)
CHLORIDE SERPL-SCNC: 108 MMOL/L (ref 95–110)
CO2 SERPL-SCNC: 28 MMOL/L (ref 23–29)
CREAT SERPL-MCNC: 0.9 MG/DL (ref 0.5–1.4)
DIFFERENTIAL METHOD: ABNORMAL
EOSINOPHIL # BLD AUTO: 0.1 K/UL (ref 0–0.5)
EOSINOPHIL NFR BLD: 1.6 % (ref 0–8)
ERYTHROCYTE [DISTWIDTH] IN BLOOD BY AUTOMATED COUNT: 12.3 % (ref 11.5–14.5)
EST. GFR  (NO RACE VARIABLE): >60 ML/MIN/1.73 M^2
GLUCOSE SERPL-MCNC: 105 MG/DL (ref 70–110)
HCT VFR BLD AUTO: 37.2 % (ref 37–48.5)
HGB BLD-MCNC: 12.6 G/DL (ref 12–16)
IMM GRANULOCYTES # BLD AUTO: 0.02 K/UL (ref 0–0.04)
IMM GRANULOCYTES NFR BLD AUTO: 0.4 % (ref 0–0.5)
LYMPHOCYTES # BLD AUTO: 1.5 K/UL (ref 1–4.8)
LYMPHOCYTES NFR BLD: 28.7 % (ref 18–48)
MCH RBC QN AUTO: 31.3 PG (ref 27–31)
MCHC RBC AUTO-ENTMCNC: 33.9 G/DL (ref 32–36)
MCV RBC AUTO: 92 FL (ref 82–98)
MONOCYTES # BLD AUTO: 0.5 K/UL (ref 0.3–1)
MONOCYTES NFR BLD: 9.5 % (ref 4–15)
NEUTROPHILS # BLD AUTO: 3.1 K/UL (ref 1.8–7.7)
NEUTROPHILS NFR BLD: 59.4 % (ref 38–73)
NRBC BLD-RTO: 0 /100 WBC
PLATELET # BLD AUTO: 189 K/UL (ref 150–450)
PMV BLD AUTO: 10.3 FL (ref 9.2–12.9)
POTASSIUM SERPL-SCNC: 4.8 MMOL/L (ref 3.5–5.1)
PROT SERPL-MCNC: 7.1 G/DL (ref 6–8.4)
RBC # BLD AUTO: 4.03 M/UL (ref 4–5.4)
SODIUM SERPL-SCNC: 142 MMOL/L (ref 136–145)
WBC # BLD AUTO: 5.16 K/UL (ref 3.9–12.7)

## 2023-04-06 PROCEDURE — 80053 COMPREHEN METABOLIC PANEL: CPT | Mod: PN | Performed by: NURSE PRACTITIONER

## 2023-04-06 PROCEDURE — 99214 OFFICE O/P EST MOD 30 MIN: CPT | Mod: PBBFAC,PN | Performed by: NURSE PRACTITIONER

## 2023-04-06 PROCEDURE — 99213 PR OFFICE/OUTPT VISIT, EST, LEVL III, 20-29 MIN: ICD-10-PCS | Mod: S$PBB,,, | Performed by: NURSE PRACTITIONER

## 2023-04-06 PROCEDURE — 99999 PR PBB SHADOW E&M-EST. PATIENT-LVL IV: CPT | Mod: PBBFAC,,, | Performed by: NURSE PRACTITIONER

## 2023-04-06 PROCEDURE — 99999 PR PBB SHADOW E&M-EST. PATIENT-LVL IV: ICD-10-PCS | Mod: PBBFAC,,, | Performed by: NURSE PRACTITIONER

## 2023-04-06 PROCEDURE — 36415 COLL VENOUS BLD VENIPUNCTURE: CPT | Mod: PN | Performed by: NURSE PRACTITIONER

## 2023-04-06 PROCEDURE — 99213 OFFICE O/P EST LOW 20 MIN: CPT | Mod: S$PBB,,, | Performed by: NURSE PRACTITIONER

## 2023-04-06 PROCEDURE — 85025 COMPLETE CBC W/AUTO DIFF WBC: CPT | Mod: PN | Performed by: NURSE PRACTITIONER

## 2023-04-06 NOTE — PROGRESS NOTES
Subjective:      Name: Sarahy Coronel  : 1945  MRN: [id number]    CC:  60 month post surveillance    HPI:   Sarahy Coronel is a 77 y.o. female presents for evaluation of Malignant neoplasm of right breast in female, estrogen rece (6 month follow up with labs)    The patient is a 77-year-old white female known to Dr. Ricardo for Stage I (T1b, N0, M0) papillary right breast carcinoma.    Tumor was found to be ER/OK positive & [id number] negative.    She is status post right mastectomy (17) for residual disease per Dr. Starr.    Oncotype DX did not have enough tissue for analysis.    She was started on Arimidex on 2018.    She received her 1st Prolia injection on 2018.    The patient also carries the diagnosis of Osteoporosis that was diagnosed in .     She returns to clinic today for her 60 month post breast evaluation and next Prolia.   She continues to work in her yard and take care of her dog.  She remains active and well. She denies any invasive dental work in the last 3 months.    She denies any difficulties with chest pain, nipple discharge, unbearable hot flashes, fevers, chills, abdominal discomfort, nausea, vomiting, diarrhea, bleeding, etc.    No new complaints or pertinent findings on a 14-point review of systems.    Past Medical History:   Diagnosis Date    Actinic keratoses     Allergy     Anticoagulant long-term use     asa 81    Arthritis     Breast cancer 2017    r mastectomy    Chronic bronchitis     DVT (deep venous thrombosis)     leg, no precipitating factors, no longer on anticoagulants    Encounter for blood transfusion     ectopic pregnancy    Family history of complications due to general anesthesia     sister's daughter almost  from MALIGNANT HYPERTHERMIA ( pt denies adverse reaction)    GERD (gastroesophageal reflux disease)     no medication    Hypertension, isolated systolic 4/3/2023    Hypothyroidism     Leukopenia past Hx    Malignant hyperthermia      "lyssa had a reaction to anesthesia- not sure if this was the same thing    Mitral regurgitation     mild,asymptomatic    Phlebitis     past Hx, legs    PONV (postoperative nausea and vomiting)     once only    Skin cancer     TMJ (dislocation of temporomandibular joint)        Past Surgical History:   Procedure Laterality Date    APPENDECTOMY      BREAST SURGERY      CHOLECYSTECTOMY      COLONOSCOPY      ECTOPIC PREGNANCY SURGERY      removal of ovary and tube prior to hysterectomy    ESOPHAGOGASTRODUODENOSCOPY      HYSTERECTOMY      KNEE SURGERY  8/14    meniscal repair    LIPOMA RESECTION      left shoulder    TOTAL ABDOMINAL HYSTERECTOMY W/ BILATERAL SALPINGOOPHORECTOMY  1970's    after ectopic pregnancy       Family History   Problem Relation Age of Onset    Breast cancer Other 40    Heart disease Mother     Cancer Father     Bladder Cancer Father     Cancer Maternal Uncle     Cancer Paternal Uncle     Breast cancer Maternal Aunt        Social History     Socioeconomic History    Marital status:    Tobacco Use    Smoking status: Never    Smokeless tobacco: Never   Substance and Sexual Activity    Alcohol use: No    Drug use: No    Sexual activity: Not Currently       Review of patient's allergies indicates:   Allergen Reactions    Demerol (pf) [meperidine (pf)] Nausea And Vomiting    Erythromycin Other (See Comments)     Stomach cramps    Lorabid [loracarbef] Nausea And Vomiting    Penicillin g Hives    Fosamax [alendronate] Other (See Comments)     Bone pain       Review of Systems   All other systems reviewed and are negative.         Objective:   Weight:  down 1/2 pound in 6 months  Vitals:    04/06/23 1025   BP: (!) 153/70   BP Location: Right arm   Patient Position: Sitting   BP Method: Medium (Automatic)   Pulse: (!) 57   Resp: 16   Temp: 96.6 °F (35.9 °C)   TempSrc: Temporal   SpO2: 100%   Weight: 68 kg (149 lb 14.6 oz)   Height: 5' 6" (1.676 m)        Physical Exam  Vitals reviewed. "   Constitutional:       General: She is not in acute distress.  HENT:      Head: Normocephalic and atraumatic.      Mouth/Throat:      Pharynx: Oropharynx is clear.   Eyes:      Conjunctiva/sclera: Conjunctivae normal.   Cardiovascular:      Rate and Rhythm: Normal rate and regular rhythm.      Pulses: Normal pulses.      Heart sounds: No murmur heard.  Pulmonary:      Effort: Pulmonary effort is normal. No respiratory distress.   Chest:      Comments: BREASTS:  Right breast mastectomy with no signs of reoccurrence about the chest wall.  Left breast soft, free of masses, tenderness, nipple discharge or inversion.    Abdominal:      General: Bowel sounds are normal. There is no distension.      Palpations: Abdomen is soft.      Tenderness: There is no abdominal tenderness.   Musculoskeletal:         General: Normal range of motion.      Cervical back: Neck supple.      Left lower leg: No edema.   Lymphadenopathy:      Cervical: No cervical adenopathy.   Skin:     General: Skin is warm and dry.   Neurological:      Mental Status: She is alert and oriented to person, place, and time.   Psychiatric:         Behavior: Behavior normal.         Thought Content: Thought content normal.           Current Outpatient Medications on File Prior to Visit   Medication Sig    ascorbic acid, vitamin C, (VITAMIN C) 1000 MG tablet Take 1,000 mg by mouth once daily.    calcium carbonate (OS-KAYLIN) 600 mg calcium (1,500 mg) Tab Take 600 mg by mouth once daily.    denosumab (PROLIA) 60 mg/mL Syrg Inject 60 mg into the skin every 6 (six) months.     ergocalciferol, vitamin D2, (VITAMIN D ORAL) Take 5,000 Units by mouth once daily.    fish oil-omega-3 fatty acids 300-1,000 mg capsule Take 1 capsule by mouth once daily.     Lactobacillus rhamnosus GG (CULTURELLE) 10 billion cell capsule Take 1 capsule by mouth once daily.    levothyroxine (SYNTHROID) 88 MCG tablet Take 1 tablet (88 mcg total) by mouth before breakfast.    multivitamin capsule  Take 1 capsule by mouth once daily.    [DISCONTINUED] anastrozole (ARIMIDEX) 1 mg Tab Take 1 tablet (1 mg total) by mouth once daily.    cetirizine (ZYRTEC) 10 MG tablet Take 1 tablet (10 mg total) by mouth as needed for Allergies. For allergies    fluticasone propionate (FLONASE) 50 mcg/actuation nasal spray USE 1 SPRAY (50 MCG TOTAL) IN EACH NOSTRIL ONCE DAILY (Patient not taking: Reported on 4/6/2023)    zinc gluconate 50 mg tablet Take 50 mg by mouth once daily.     No current facility-administered medications on file prior to visit.       CBC:  Lab Results   Component Value Date    WBC 5.16 04/06/2023    HGB 12.6 04/06/2023    HCT 37.2 04/06/2023    MCV 92 04/06/2023     04/06/2023     CMP:  Sodium   Date Value Ref Range Status   04/06/2023 142 136 - 145 mmol/L Final     Potassium   Date Value Ref Range Status   04/06/2023 4.8 3.5 - 5.1 mmol/L Final     Chloride   Date Value Ref Range Status   04/06/2023 108 95 - 110 mmol/L Final     CO2   Date Value Ref Range Status   04/06/2023 28 23 - 29 mmol/L Final     Glucose   Date Value Ref Range Status   04/06/2023 105 70 - 110 mg/dL Final     BUN   Date Value Ref Range Status   04/06/2023 24 (H) 8 - 23 mg/dL Final     Creatinine   Date Value Ref Range Status   04/06/2023 0.9 0.5 - 1.4 mg/dL Final     Calcium   Date Value Ref Range Status   04/06/2023 10.1 8.7 - 10.5 mg/dL Final     Total Protein   Date Value Ref Range Status   04/06/2023 7.1 6.0 - 8.4 g/dL Final     Albumin   Date Value Ref Range Status   04/06/2023 3.9 3.5 - 5.2 g/dL Final     Total Bilirubin   Date Value Ref Range Status   04/06/2023 0.4 0.1 - 1.0 mg/dL Final     Comment:     For infants and newborns, interpretation of results should be based  on gestational age, weight and in agreement with clinical  observations.    Premature Infant recommended reference ranges:  Up to 24 hours.............<8.0 mg/dL  Up to 48 hours............<12.0 mg/dL  3-5 days..................<15.0 mg/dL  6-29  days.................<15.0 mg/dL       Alkaline Phosphatase   Date Value Ref Range Status   04/06/2023 41 (L) 55 - 135 U/L Final     AST   Date Value Ref Range Status   04/06/2023 16 10 - 40 U/L Final     ALT   Date Value Ref Range Status   04/06/2023 15 10 - 44 U/L Final     Anion Gap   Date Value Ref Range Status   04/06/2023 6 (L) 8 - 16 mmol/L Final     eGFR if    Date Value Ref Range Status   03/28/2022 >60 >60 mL/min/1.73 m^2 Final     eGFR if non    Date Value Ref Range Status   03/28/2022 >60 >60 mL/min/1.73 m^2 Final     Comment:     Calculation used to obtain the estimated glomerular filtration  rate (eGFR) is the CKD-EPI equation.            All pertinent labs reviewed.    Assessment:       1. Malignant neoplasm of right breast in female, estrogen receptor positive, unspecified site of breast    2. S/P right mastectomy    3. Disorder of bone and cartilage    4. Encounter for screening mammogram for malignant neoplasm of breast         Plan:     Malignant neoplasm of right breast in female, estrogen receptor positive, unspecified site of breast  -     CBC Auto Differential; Future; Expected date: 04/06/2023  -     Comprehensive Metabolic Panel; Future; Expected date: 04/06/2023  -     Mammo Digital Screening Left with Dax; Future; Expected date: 06/16/2023    S/P right mastectomy  -     Mammo Digital Screening Left with Dax; Future; Expected date: 06/16/2023    Disorder of bone and cartilage  -     Vitamin D; Future; Expected date: 04/06/2023    Encounter for screening mammogram for malignant neoplasm of breast  -     Mammo Digital Screening Left with Dax; Future; Expected date: 06/16/2023       As patient has completed 60 months of antiestrogen therapy with DEYVI; d/c Arimidex & Prolia.  F/U in June/2023 after mammogram that is due 06/16/23.  Labs:  CBC, CMP, VIT D prior to f/u in June; will plan on annual evaluation after June/23 visit.    Route Chart for  Scheduling    Med Onc Chart Routing      Follow up with physician    Follow up with [facility] . F/U in June/23 after mammo done:  will need CBC, CMP, VIT D & Mammo prior (on or after 06/16/2023)   Infusion scheduling note    Injection scheduling note    Labs    Imaging    Pharmacy appointment    Other referrals          minutes were spent in coordination of patient's care, record review and counseling.

## 2023-06-19 ENCOUNTER — HOSPITAL ENCOUNTER (OUTPATIENT)
Dept: RADIOLOGY | Facility: HOSPITAL | Age: 78
Discharge: HOME | End: 2023-06-19
Attending: NURSE PRACTITIONER
Payer: MEDICARE

## 2023-06-19 DIAGNOSIS — Z17.0 MALIGNANT NEOPLASM OF RIGHT BREAST IN FEMALE, ESTROGEN RECEPTOR POSITIVE, UNSPECIFIED SITE OF BREAST: ICD-10-CM

## 2023-06-19 DIAGNOSIS — Z90.11 S/P RIGHT MASTECTOMY: ICD-10-CM

## 2023-06-19 DIAGNOSIS — Z12.31 ENCOUNTER FOR SCREENING MAMMOGRAM FOR MALIGNANT NEOPLASM OF BREAST: ICD-10-CM

## 2023-06-19 DIAGNOSIS — C50.911 MALIGNANT NEOPLASM OF RIGHT BREAST IN FEMALE, ESTROGEN RECEPTOR POSITIVE, UNSPECIFIED SITE OF BREAST: ICD-10-CM

## 2023-06-19 PROCEDURE — 77063 MAMMO DIGITAL SCREENING LEFT WITH TOMO: ICD-10-PCS | Mod: 26,52,, | Performed by: RADIOLOGY

## 2023-06-19 PROCEDURE — 77063 BREAST TOMOSYNTHESIS BI: CPT | Mod: 26,52,, | Performed by: RADIOLOGY

## 2023-06-19 PROCEDURE — 77067 SCR MAMMO BI INCL CAD: CPT | Mod: TC,52,PO

## 2023-06-19 PROCEDURE — 77067 SCR MAMMO BI INCL CAD: CPT | Mod: 26,52,, | Performed by: RADIOLOGY

## 2023-06-19 PROCEDURE — 77067 MAMMO DIGITAL SCREENING LEFT WITH TOMO: ICD-10-PCS | Mod: 26,52,, | Performed by: RADIOLOGY

## 2023-06-23 ENCOUNTER — TELEPHONE (OUTPATIENT)
Dept: INFUSION THERAPY | Facility: HOSPITAL | Age: 78
End: 2023-06-23
Payer: MEDICARE

## 2023-06-23 ENCOUNTER — OFFICE VISIT (OUTPATIENT)
Dept: HEMATOLOGY/ONCOLOGY | Facility: CLINIC | Age: 78
End: 2023-06-23
Payer: MEDICARE

## 2023-06-23 VITALS
HEART RATE: 70 BPM | RESPIRATION RATE: 16 BRPM | DIASTOLIC BLOOD PRESSURE: 82 MMHG | WEIGHT: 149.25 LBS | HEIGHT: 67 IN | OXYGEN SATURATION: 99 % | TEMPERATURE: 97 F | BODY MASS INDEX: 23.43 KG/M2 | SYSTOLIC BLOOD PRESSURE: 170 MMHG

## 2023-06-23 DIAGNOSIS — Z17.0 MALIGNANT NEOPLASM OF RIGHT BREAST IN FEMALE, ESTROGEN RECEPTOR POSITIVE, UNSPECIFIED SITE OF BREAST: Primary | ICD-10-CM

## 2023-06-23 DIAGNOSIS — Z17.0 MALIGNANT NEOPLASM OF OVERLAPPING SITES OF RIGHT BREAST IN FEMALE, ESTROGEN RECEPTOR POSITIVE: ICD-10-CM

## 2023-06-23 DIAGNOSIS — C50.811 MALIGNANT NEOPLASM OF OVERLAPPING SITES OF RIGHT BREAST IN FEMALE, ESTROGEN RECEPTOR POSITIVE: ICD-10-CM

## 2023-06-23 DIAGNOSIS — M85.80 OTHER SPECIFIED DISORDERS OF BONE DENSITY AND STRUCTURE, UNSPECIFIED SITE: ICD-10-CM

## 2023-06-23 DIAGNOSIS — M81.0 OSTEOPOROSIS, UNSPECIFIED OSTEOPOROSIS TYPE, UNSPECIFIED PATHOLOGICAL FRACTURE PRESENCE: ICD-10-CM

## 2023-06-23 DIAGNOSIS — C50.911 MALIGNANT NEOPLASM OF RIGHT BREAST IN FEMALE, ESTROGEN RECEPTOR POSITIVE, UNSPECIFIED SITE OF BREAST: Primary | ICD-10-CM

## 2023-06-23 DIAGNOSIS — Z90.11 S/P RIGHT MASTECTOMY: ICD-10-CM

## 2023-06-23 DIAGNOSIS — M81.6 LOCALIZED OSTEOPOROSIS (LEQUESNE): ICD-10-CM

## 2023-06-23 PROBLEM — Z79.811 USE OF ANASTROZOLE (ARIMIDEX): Status: RESOLVED | Noted: 2020-09-16 | Resolved: 2023-06-23

## 2023-06-23 PROCEDURE — 99214 OFFICE O/P EST MOD 30 MIN: CPT | Mod: S$PBB,,, | Performed by: NURSE PRACTITIONER

## 2023-06-23 PROCEDURE — 99214 PR OFFICE/OUTPT VISIT, EST, LEVL IV, 30-39 MIN: ICD-10-PCS | Mod: S$PBB,,, | Performed by: NURSE PRACTITIONER

## 2023-06-23 PROCEDURE — 99999 PR PBB SHADOW E&M-EST. PATIENT-LVL IV: ICD-10-PCS | Mod: PBBFAC,,, | Performed by: NURSE PRACTITIONER

## 2023-06-23 PROCEDURE — 99999 PR PBB SHADOW E&M-EST. PATIENT-LVL IV: CPT | Mod: PBBFAC,,, | Performed by: NURSE PRACTITIONER

## 2023-06-23 PROCEDURE — 99214 OFFICE O/P EST MOD 30 MIN: CPT | Mod: PBBFAC,PN | Performed by: NURSE PRACTITIONER

## 2023-06-23 RX ORDER — ANASTROZOLE 1 MG/1
TABLET ORAL
COMMUNITY
Start: 2023-04-20 | End: 2023-06-23 | Stop reason: ALTCHOICE

## 2023-06-23 NOTE — PROGRESS NOTES
Subjective:      Name: Sarahy Coronel  : 1945  MRN: [id number]    CC:  62 month post surveillance    HPI:   Sarahy Coronel is a 77 y.o. female presents for evaluation of Malignant neoplasm of right breast in female, estrogen rece    The patient is a 77-year-old white female known to Dr. Ricardo for Stage I (T1b, N0, M0) papillary right breast carcinoma.    Tumor was found to be ER/AL positive & [id number] negative.    She is status post right mastectomy (17) for residual disease per Dr. Starr.    Oncotype DX did not have enough tissue for analysis.    She was started on Arimidex on 2018 - discontinued on    She received her 1st Prolia injection on 2018.    The patient also carries the diagnosis of Osteoporosis that was diagnosed in ; last Prolia 10/07/2022     She returns to clinic today for her 62 month post breast evaluation and review of imaging.  She continues to work in her yard and take care of her dog.  She remains active and well.   Has been donaldo vegetables from her garden.     She denies any difficulties with chest pain, nipple discharge, unbearable hot flashes, fevers, chills, abdominal discomfort, nausea, vomiting, diarrhea, bleeding, etc.    No new complaints or pertinent findings on a 14-point review of systems.    Past Medical History:   Diagnosis Date    Actinic keratoses     Allergy     Anticoagulant long-term use     asa 83    Arthritis     Breast cancer 2017    r mastectomy    Chronic bronchitis     DVT (deep venous thrombosis)     leg, no precipitating factors, no longer on anticoagulants    Encounter for blood transfusion     ectopic pregnancy    Family history of complications due to general anesthesia     sister's daughter almost  from MALIGNANT HYPERTHERMIA ( pt denies adverse reaction)    GERD (gastroesophageal reflux disease)     no medication    Hypertension, isolated systolic 4/3/2023    Hypothyroidism     Leukopenia past Hx    Malignant  "hyperthermia     lyssa had a reaction to anesthesia- not sure if this was the same thing    Mitral regurgitation     mild,asymptomatic    Phlebitis     past Hx, legs    PONV (postoperative nausea and vomiting)     once only    Skin cancer     TMJ (dislocation of temporomandibular joint)        Past Surgical History:   Procedure Laterality Date    APPENDECTOMY      BREAST SURGERY      CHOLECYSTECTOMY      COLONOSCOPY      ECTOPIC PREGNANCY SURGERY      removal of ovary and tube prior to hysterectomy    ESOPHAGOGASTRODUODENOSCOPY      HYSTERECTOMY      KNEE SURGERY  8/14    meniscal repair    LIPOMA RESECTION      left shoulder    TOTAL ABDOMINAL HYSTERECTOMY W/ BILATERAL SALPINGOOPHORECTOMY  1970's    after ectopic pregnancy       Family History   Problem Relation Age of Onset    Breast cancer Other 40    Heart disease Mother     Cancer Father     Bladder Cancer Father     Cancer Maternal Uncle     Cancer Paternal Uncle     Breast cancer Maternal Aunt        Social History     Socioeconomic History    Marital status:    Tobacco Use    Smoking status: Never    Smokeless tobacco: Never   Substance and Sexual Activity    Alcohol use: No    Drug use: No    Sexual activity: Not Currently       Review of patient's allergies indicates:   Allergen Reactions    Demerol (pf) [meperidine (pf)] Nausea And Vomiting    Erythromycin Other (See Comments)     Stomach cramps    Lorabid [loracarbef] Nausea And Vomiting    Penicillin g Hives    Fosamax [alendronate] Other (See Comments)     Bone pain       Review of Systems   All other systems reviewed and are negative.         Objective:      Vitals:    06/23/23 0958   BP: (!) 170/82   Pulse: 70   Resp: 16   Temp: 97.1 °F (36.2 °C)   TempSrc: Temporal   SpO2: 99%   Weight: 67.7 kg (149 lb 4 oz)   Height: 5' 7" (1.702 m)        Physical Exam  Vitals reviewed.   Constitutional:       General: She is not in acute distress.  HENT:      Head: Normocephalic and atraumatic.      " Mouth/Throat:      Pharynx: Oropharynx is clear.   Eyes:      Conjunctiva/sclera: Conjunctivae normal.   Cardiovascular:      Rate and Rhythm: Normal rate and regular rhythm.      Pulses: Normal pulses.      Heart sounds: No murmur heard.  Pulmonary:      Effort: Pulmonary effort is normal. No respiratory distress.   Chest:      Comments: BREASTS:  Right breast mastectomy with no signs of reoccurrence about the chest wall.  Left breast soft, free of masses, tenderness, nipple discharge or inversion.    Abdominal:      General: Bowel sounds are normal. There is no distension.      Palpations: Abdomen is soft.      Tenderness: There is no abdominal tenderness.   Musculoskeletal:         General: Normal range of motion.      Cervical back: Neck supple.      Left lower leg: No edema.   Lymphadenopathy:      Cervical: No cervical adenopathy.   Skin:     General: Skin is warm and dry.   Neurological:      Mental Status: She is alert and oriented to person, place, and time.   Psychiatric:         Behavior: Behavior normal.         Thought Content: Thought content normal.           Current Outpatient Medications on File Prior to Visit   Medication Sig    ascorbic acid, vitamin C, (VITAMIN C) 1000 MG tablet Take 1,000 mg by mouth once daily.    calcium carbonate (OS-KAYLIN) 600 mg calcium (1,500 mg) Tab Take 600 mg by mouth once daily.    denosumab (PROLIA) 60 mg/mL Syrg Inject 60 mg into the skin every 6 (six) months.     ergocalciferol, vitamin D2, (VITAMIN D ORAL) Take 5,000 Units by mouth once daily.    fish oil-omega-3 fatty acids 300-1,000 mg capsule Take 1 capsule by mouth once daily.     fluticasone propionate (FLONASE) 50 mcg/actuation nasal spray USE 1 SPRAY (50 MCG TOTAL) IN EACH NOSTRIL ONCE DAILY    Lactobacillus rhamnosus GG (CULTURELLE) 10 billion cell capsule Take 1 capsule by mouth once daily.    levothyroxine (SYNTHROID) 88 MCG tablet Take 1 tablet (88 mcg total) by mouth before breakfast.    multivitamin  capsule Take 1 capsule by mouth once daily.    zinc gluconate 50 mg tablet Take 50 mg by mouth once daily.    [DISCONTINUED] anastrozole (ARIMIDEX) 1 mg Tab     cetirizine (ZYRTEC) 10 MG tablet Take 1 tablet (10 mg total) by mouth as needed for Allergies. For allergies     No current facility-administered medications on file prior to visit.       CBC:  Lab Results   Component Value Date    WBC 6.32 06/19/2023    HGB 12.2 06/19/2023    HCT 35.8 (L) 06/19/2023    MCV 93 06/19/2023     06/19/2023     CMP:  Sodium   Date Value Ref Range Status   06/19/2023 143 136 - 145 mmol/L Final     Potassium   Date Value Ref Range Status   06/19/2023 5.1 3.5 - 5.1 mmol/L Final     Chloride   Date Value Ref Range Status   06/19/2023 108 95 - 110 mmol/L Final     CO2   Date Value Ref Range Status   06/19/2023 26 23 - 29 mmol/L Final     Glucose   Date Value Ref Range Status   06/19/2023 98 70 - 110 mg/dL Final     BUN   Date Value Ref Range Status   06/19/2023 17 8 - 23 mg/dL Final     Creatinine   Date Value Ref Range Status   06/19/2023 0.8 0.5 - 1.4 mg/dL Final     Calcium   Date Value Ref Range Status   06/19/2023 10.0 8.7 - 10.5 mg/dL Final     Total Protein   Date Value Ref Range Status   06/19/2023 6.9 6.0 - 8.4 g/dL Final     Albumin   Date Value Ref Range Status   06/19/2023 3.9 3.5 - 5.2 g/dL Final     Total Bilirubin   Date Value Ref Range Status   06/19/2023 0.4 0.1 - 1.0 mg/dL Final     Comment:     For infants and newborns, interpretation of results should be based  on gestational age, weight and in agreement with clinical  observations.    Premature Infant recommended reference ranges:  Up to 24 hours.............<8.0 mg/dL  Up to 48 hours............<12.0 mg/dL  3-5 days..................<15.0 mg/dL  6-29 days.................<15.0 mg/dL       Alkaline Phosphatase   Date Value Ref Range Status   06/19/2023 46 (L) 55 - 135 U/L Final     AST   Date Value Ref Range Status   06/19/2023 17 10 - 40 U/L Final     ALT    Date Value Ref Range Status   06/19/2023 16 10 - 44 U/L Final     Anion Gap   Date Value Ref Range Status   06/19/2023 9 8 - 16 mmol/L Final     eGFR if    Date Value Ref Range Status   03/28/2022 >60 >60 mL/min/1.73 m^2 Final     eGFR if non    Date Value Ref Range Status   03/28/2022 >60 >60 mL/min/1.73 m^2 Final     Comment:     Calculation used to obtain the estimated glomerular filtration  rate (eGFR) is the CKD-EPI equation.        Vitamin D:  85    Mammo Digital Screening Left with Dax  Narrative: Result:  Mammo Digital Screening Left with Dax    History:  Patient is 77 y.o. and is seen for a screening mammogram.    Films Compared:  Compared to: 06/16/2022 Mammo Digital Screening Left with Dax (No   Change), 06/14/2021 Mammo Digital Diagnostic Left with Dax, and   06/01/2020 Mammo Digital Diagnostic Left w/ Dax     Findings:   This procedure was performed using tomosynthesis.   Computer-aided detection was utilized in the interpretation of this   examination.     The patient is status post contralateral mastectomy.  Only the remaining   breast has been imaged.    The left breast has scattered areas of fibroglandular density. There is no   evidence of suspicious masses, microcalcifications or architectural   distortion.  Impression:    No mammographic evidence of malignancy.    BI-RADS Category 1: Negative    Recommendation:  Routine screening mammogram in 1 year is recommended.        All pertinent labs & Mammo reviewed.    Assessment:       1. Malignant neoplasm of right breast in female, estrogen receptor positive, unspecified site of breast    2. S/P right mastectomy    3. Other specified disorders of bone density and structure, unspecified site    4. Localized osteoporosis ([place])    5. Malignant neoplasm of overlapping sites of right breast in female, estrogen receptor positive         Plan:     Malignant neoplasm of right breast in female, estrogen receptor  positive, unspecified site of breast  -     CBC Auto Differential; Future; Expected date: 06/23/2023  -     Comprehensive Metabolic Panel; Future; Expected date: 06/23/2023  -     Vitamin D; Future; Expected date: 06/23/2023  -     DXA Bone Density Axial Skeleton 1 or more sites; Future; Expected date: 06/23/2023  -     POST-MASTECTOMY BRA FOR HOME USE  -     Mammo Digital Diagnostic Left with Dax; Future; Expected date: 06/23/2023    S/P right mastectomy  -     POST-MASTECTOMY BRA FOR HOME USE  -     Mammo Digital Diagnostic Left with Dax; Future; Expected date: 06/23/2023    Other specified disorders of bone density and structure, unspecified site  -     Vitamin D; Future; Expected date: 06/23/2023    Localized osteoporosis ([place])  -     DXA Bone Density Axial Skeleton 1 or more sites; Future; Expected date: 06/23/2023    Malignant neoplasm of overlapping sites of right breast in female, estrogen receptor positive  -     Mammo Digital Diagnostic Left with Dax; Future; Expected date: 06/23/2023       Hx of right breast cancer - DEYVI @ 62 months post tx; f/u in 1 year with CBC, CMP, VIT D, BMD & Mammo (on or after 06/19/24)  Osteoporosis - now Osteopenia; restart Prolia; f/u in December/2024 around 12/15 with BMP, Vitamin D prior for eval of Prolia.  Call for any concerns.    Route Chart for Scheduling    Med Onc Chart Routing      Follow up with physician    Follow up with [facility] 1 year. f/u in 1 year for annual breast & Prolia with CBC, CMP, VIT D, MAMMO (on or after 06/19/24) & BMD prior; f/u in December 15 th with BMP, Vitamin D prior for Prolia   Infusion scheduling note   Resubmitted for Prolia - need scheduled 12/15/23; then in June with annual breast   Injection scheduling note Prolia in December with BMP, Vitamin D prior   Labs    Imaging    Pharmacy appointment    Other referrals          32 minutes were spent in coordination of patient's care, record review and counseling.

## 2023-07-26 ENCOUNTER — TELEPHONE (OUTPATIENT)
Dept: FAMILY MEDICINE | Facility: CLINIC | Age: 78
End: 2023-07-26
Payer: MEDICARE

## 2023-07-26 NOTE — TELEPHONE ENCOUNTER
----- Message from Deb Leigh sent at 7/26/2023 11:20 AM CDT -----  Contact: Self  Type:  Same Day Appointment Request    Caller is requesting a same day appointment.  Caller declined first available appointment listed below.      Name of Caller:  Patient  When is the first available appointment?  8/7  Symptoms:  pt has stubbed her middle toe and it is swollen and blue and cannot put any pressure on it, can barely walk on it   Best Call Back Number:  [phone]  Additional Information:   Please call pt back to advise. Thank You.

## 2023-07-26 NOTE — TELEPHONE ENCOUNTER
Pt was told that her provider did not have any available appointment on today. She was told that she could go to an urgent care ar offered an appt with another provider on tomorrow. Pt said she will go to urgent care tomorrow.

## 2023-08-07 ENCOUNTER — TELEPHONE (OUTPATIENT)
Dept: ORTHOPEDICS | Facility: CLINIC | Age: 78
End: 2023-08-07
Payer: MEDICARE

## 2023-08-07 ENCOUNTER — OFFICE VISIT (OUTPATIENT)
Dept: FAMILY MEDICINE | Facility: CLINIC | Age: 78
End: 2023-08-07
Payer: MEDICARE

## 2023-08-07 VITALS
HEIGHT: 67 IN | OXYGEN SATURATION: 97 % | HEART RATE: 80 BPM | WEIGHT: 149.94 LBS | BODY MASS INDEX: 23.53 KG/M2 | SYSTOLIC BLOOD PRESSURE: 152 MMHG | DIASTOLIC BLOOD PRESSURE: 78 MMHG

## 2023-08-07 DIAGNOSIS — S92.911A CLOSED NONDISPLACED FRACTURE OF PHALANX OF TOE OF RIGHT FOOT, UNSPECIFIED TOE, INITIAL ENCOUNTER: ICD-10-CM

## 2023-08-07 DIAGNOSIS — E78.5 HYPERLIPIDEMIA, UNSPECIFIED HYPERLIPIDEMIA TYPE: Primary | ICD-10-CM

## 2023-08-07 DIAGNOSIS — I10 PRIMARY HYPERTENSION: ICD-10-CM

## 2023-08-07 PROCEDURE — 99999 PR PBB SHADOW E&M-EST. PATIENT-LVL IV: CPT | Mod: PBBFAC,,, | Performed by: FAMILY MEDICINE

## 2023-08-07 PROCEDURE — 99214 PR OFFICE/OUTPT VISIT, EST, LEVL IV, 30-39 MIN: ICD-10-PCS | Mod: S$PBB,,, | Performed by: FAMILY MEDICINE

## 2023-08-07 PROCEDURE — 99214 OFFICE O/P EST MOD 30 MIN: CPT | Mod: S$PBB,,, | Performed by: FAMILY MEDICINE

## 2023-08-07 PROCEDURE — 99214 OFFICE O/P EST MOD 30 MIN: CPT | Mod: PBBFAC,PO | Performed by: FAMILY MEDICINE

## 2023-08-07 PROCEDURE — 99999 PR PBB SHADOW E&M-EST. PATIENT-LVL IV: ICD-10-PCS | Mod: PBBFAC,,, | Performed by: FAMILY MEDICINE

## 2023-08-07 RX ORDER — LOSARTAN POTASSIUM 25 MG/1
25 TABLET ORAL DAILY
Qty: 90 TABLET | Refills: 3 | Status: SHIPPED | OUTPATIENT
Start: 2023-08-07 | End: 2024-02-08 | Stop reason: SDUPTHER

## 2023-08-07 NOTE — PROGRESS NOTES
Subjective:       Patient ID: Sarahy Coronel is a 77 y.o. female.    Chief Complaint: Toe Injury (Right middle toe pain level 4 worse pain when walking 10)    Here for follow up multiple chronic medical issues. Doing well overall and in normal state of health.  New issue hit right middle toe pain since 7-26; seen at  and told fracture and loree tape.  Hit on night  middle of night.      Toe Injury  This is a new problem. The current episode started 1 to 4 weeks ago. Associated symptoms include arthralgias. Pertinent negatives include no chest pain, chills, coughing or fever.     Review of Systems   Constitutional:  Negative for chills and fever.   Respiratory:  Negative for cough, chest tightness and shortness of breath.    Cardiovascular:  Negative for chest pain, palpitations and leg swelling.   Endocrine: Negative for cold intolerance and heat intolerance.   Musculoskeletal:  Positive for arthralgias.   Psychiatric/Behavioral:  Negative for decreased concentration. The patient is not nervous/anxious.        Objective:      Physical Exam  Vitals and nursing note reviewed.   Constitutional:       Appearance: She is well-developed.   HENT:      Head: Normocephalic and atraumatic.   Cardiovascular:      Rate and Rhythm: Normal rate and regular rhythm.      Heart sounds: Normal heart sounds.   Pulmonary:      Effort: Pulmonary effort is normal.      Breath sounds: Normal breath sounds.   Psychiatric:         Mood and Affect: Mood normal.         Behavior: Behavior normal.         Assessment:       1. Hyperlipidemia, unspecified hyperlipidemia type    2. Primary hypertension    3. Closed nondisplaced fracture of phalanx of toe of right foot, unspecified toe, initial encounter        Plan:       Hyperlipidemia, unspecified hyperlipidemia type    Primary hypertension    Closed nondisplaced fracture of phalanx of toe of right foot, unspecified toe, initial encounter  -     Ambulatory referral/consult to Podiatry;  Future; Expected date: 08/14/2023  -     Ambulatory referral/consult to Orthopedics; Future; Expected date: 08/14/2023    Other orders  -     losartan (COZAAR) 25 MG tablet; Take 1 tablet (25 mg total) by mouth once daily.  Dispense: 90 tablet; Refill: 3      Htn needs med; start and nurse bp check 2 wks at [place] [facility]  Lipid stable  Reviewed recent lab  Will monitor chronic medical issues and continue current plan of care.  Offered pain med and she declined    Follow up in about 6 months (around 2/7/2024), or if symptoms worsen or fail to improve.

## 2023-08-08 ENCOUNTER — TELEPHONE (OUTPATIENT)
Dept: HEMATOLOGY/ONCOLOGY | Facility: CLINIC | Age: 78
End: 2023-08-08
Payer: MEDICARE

## 2023-08-08 ENCOUNTER — OFFICE VISIT (OUTPATIENT)
Dept: ORTHOPEDICS | Facility: CLINIC | Age: 78
End: 2023-08-08
Payer: MEDICARE

## 2023-08-08 ENCOUNTER — HOSPITAL ENCOUNTER (OUTPATIENT)
Dept: RADIOLOGY | Facility: HOSPITAL | Age: 78
Discharge: HOME | End: 2023-08-08
Attending: NURSE PRACTITIONER
Payer: MEDICARE

## 2023-08-08 VITALS — BODY MASS INDEX: 23.39 KG/M2 | HEIGHT: 67 IN | WEIGHT: 149 LBS

## 2023-08-08 DIAGNOSIS — S92.911A CLOSED NONDISPLACED FRACTURE OF PHALANX OF TOE OF RIGHT FOOT, UNSPECIFIED TOE, INITIAL ENCOUNTER: ICD-10-CM

## 2023-08-08 DIAGNOSIS — S92.911A CLOSED NONDISPLACED FRACTURE OF PHALANX OF TOE OF RIGHT FOOT, UNSPECIFIED TOE, INITIAL ENCOUNTER: Primary | ICD-10-CM

## 2023-08-08 PROCEDURE — 99213 OFFICE O/P EST LOW 20 MIN: CPT | Mod: PBBFAC,PN,25 | Performed by: NURSE PRACTITIONER

## 2023-08-08 PROCEDURE — 99213 OFFICE O/P EST LOW 20 MIN: CPT | Mod: S$PBB,57,ICN, | Performed by: NURSE PRACTITIONER

## 2023-08-08 PROCEDURE — 28510 TREATMENT OF TOE FRACTURE: CPT | Mod: S$PBB,T7,, | Performed by: NURSE PRACTITIONER

## 2023-08-08 PROCEDURE — 99213 PR OFFICE/OUTPT VISIT, EST, LEVL III, 20-29 MIN: ICD-10-PCS | Mod: S$PBB,57,ICN, | Performed by: NURSE PRACTITIONER

## 2023-08-08 PROCEDURE — 99999 PR PBB SHADOW E&M-EST. PATIENT-LVL III: ICD-10-PCS | Mod: PBBFAC,,, | Performed by: NURSE PRACTITIONER

## 2023-08-08 PROCEDURE — 28510 TREATMENT OF TOE FRACTURE: CPT | Mod: PBBFAC,PN | Performed by: NURSE PRACTITIONER

## 2023-08-08 PROCEDURE — 99999 PR PBB SHADOW E&M-EST. PATIENT-LVL III: CPT | Mod: PBBFAC,,, | Performed by: NURSE PRACTITIONER

## 2023-08-08 PROCEDURE — 28510 PR CLOSED RX TOE FX: ICD-10-PCS | Mod: S$PBB,T7,, | Performed by: NURSE PRACTITIONER

## 2023-08-08 NOTE — PROGRESS NOTES
Chief Complaint   Patient presents with    Right Foot - Pain       HPI:    This is a 77 y.o. who presents today complaining of right foot/toe pain for 2 weeks after hitting her foot on her night stand. Pain is aching and dull. No numbness or tingling. No associated signs or symptoms. She is having pain with walking. She has been taking tylenol which is helping some. She was offered pain medicine by her primary care doctor, but she would rather not as she doesn't like taking it.      Past Medical History:   Diagnosis Date    Actinic keratoses     Allergy     Anticoagulant long-term use     asa 81    Arthritis     Breast cancer 2017    r mastectomy    Chronic bronchitis     DVT (deep venous thrombosis)     leg, no precipitating factors, no longer on anticoagulants    Encounter for blood transfusion     ectopic pregnancy    Family history of complications due to general anesthesia     sister's daughter almost  from MALIGNANT HYPERTHERMIA ( pt denies adverse reaction)    GERD (gastroesophageal reflux disease)     no medication    Hypertension, isolated systolic 4/3/2023    Hypothyroidism     Leukopenia past Hx    Malignant hyperthermia     neice had a reaction to anesthesia- not sure if this was the same thing    Mitral regurgitation     mild,asymptomatic    Phlebitis     past Hx, legs    PONV (postoperative nausea and vomiting)     once only    Skin cancer     TMJ (dislocation of temporomandibular joint)       Past Surgical History:   Procedure Laterality Date    APPENDECTOMY      BREAST SURGERY      CHOLECYSTECTOMY      COLONOSCOPY      ECTOPIC PREGNANCY SURGERY      removal of ovary and tube prior to hysterectomy    ESOPHAGOGASTRODUODENOSCOPY      HYSTERECTOMY      KNEE SURGERY      meniscal repair    LIPOMA RESECTION      left shoulder    TOTAL ABDOMINAL HYSTERECTOMY W/ BILATERAL SALPINGOOPHORECTOMY      after ectopic pregnancy      Current Outpatient Medications on File Prior to Visit    Medication Sig Dispense Refill    ascorbic acid, vitamin C, (VITAMIN C) 1000 MG tablet Take 1,000 mg by mouth once daily.      calcium carbonate (OS-KAYLIN) 600 mg calcium (1,500 mg) Tab Take 600 mg by mouth once daily.      denosumab (PROLIA) 60 mg/mL Syrg Inject 60 mg into the skin every 6 (six) months.       ergocalciferol, vitamin D2, (VITAMIN D ORAL) Take 5,000 Units by mouth once daily.      fish oil-omega-3 fatty acids 300-1,000 mg capsule Take 1 capsule by mouth once daily. On hold      fluticasone propionate (FLONASE) 50 mcg/actuation nasal spray USE 1 SPRAY (50 MCG TOTAL) IN EACH NOSTRIL ONCE DAILY 48 g 1    Lactobacillus rhamnosus GG (CULTURELLE) 10 billion cell capsule Take 1 capsule by mouth once daily.      levothyroxine (SYNTHROID) 88 MCG tablet Take 1 tablet (88 mcg total) by mouth before breakfast. 90 tablet 3    losartan (COZAAR) 25 MG tablet Take 1 tablet (25 mg total) by mouth once daily. 90 tablet 3    multivitamin capsule Take 1 capsule by mouth once daily.      TUMERIC-GING-OLIVE-OREG-CAPRYL ORAL Take by mouth.      zinc gluconate 50 mg tablet Take 50 mg by mouth once daily.      cetirizine (ZYRTEC) 10 MG tablet Take 1 tablet (10 mg total) by mouth as needed for Allergies. For allergies 90 tablet 1     No current facility-administered medications on file prior to visit.      Review of patient's allergies indicates:   Allergen Reactions    Demerol (pf) [meperidine (pf)] Nausea And Vomiting    Erythromycin Other (See Comments)     Stomach cramps    Lorabid [loracarbef] Nausea And Vomiting    Penicillin g Hives    Fosamax [alendronate] Other (See Comments)     Bone pain      Family History not pertinent   Social History     Socioeconomic History    Marital status:    Tobacco Use    Smoking status: Never    Smokeless tobacco: Never   Substance and Sexual Activity    Alcohol use: No    Drug use: No    Sexual activity: Not Currently         Review of Systems:   Constitutional:  Denies fever or  chills    Eyes:  Denies change in visual acuity    HENT:  Denies nasal congestion or sore throat    Respiratory:  Denies cough or shortness of breath    Cardiovascular:  Denies chest pain or edema    GI:  Denies abdominal pain, nausea, vomiting, bloody stools or diarrhea    :  Denies dysuria    Integument:  Denies rash    Neurologic:  Denies headache, focal weakness or sensory changes    Endocrine:  Denies polyuria or polydipsia    Lymphatic:  Denies swollen glands    Psychiatric:  Denies depression or anxiety       Physical Exam:    Constitutional:  Well developed, well nourished, no acute distress, non-toxic appearance    Integument:  Well hydrated  Neurologic:  Alert & oriented x 3  Psychiatric:  Speech and behavior appropriate    Right foot:  2nd and 3rd toe with swelling. No visible deformity.   +TTP    X-rays were performed today, personally reviewed by me and findings discussed with the patient.   3 views of the right foot show nondisplaced 3rd phalanx.          Closed nondisplaced fracture of phalanx of toe of right foot, unspecified toe, initial encounter  -     Ambulatory referral/consult to Orthopedics      Place in post op shoe.   Wear post op shoe for 4-6 weeks.   She may take tylenol and alternate with ibuprofen as needed.   Return to clinic as needed.

## 2023-08-08 NOTE — TELEPHONE ENCOUNTER
Returned pt call and reviewed scheduled appts are correct in Dec and June for labs, NP, and Prolia, as scheduled.  Pt to have 6 month labs, NP appt and Prolia, in Dec; and yearly breast exam in June.  Pt verbalized understanding.    ----- Message from Talisha Chambers sent at 8/8/2023  1:54 PM CDT -----  Contact: Pt  Type:  Needs Medical Advice    Who Called: Pt    Would the patient rather a call back or a response via [facility]? call  Best Call Back Number: [phone]  Additional Information: Pt thought she was only suppose to see Dr. Zamora in Jun of 2024. She has an appt on 12/19/2023. Pt would like a call back to discuss.

## 2023-08-21 ENCOUNTER — CLINICAL SUPPORT (OUTPATIENT)
Dept: PRIMARY CARE CLINIC | Facility: CLINIC | Age: 78
End: 2023-08-21
Payer: MEDICARE

## 2023-08-21 DIAGNOSIS — Z01.30 BP CHECK: Primary | ICD-10-CM

## 2023-08-21 NOTE — PROGRESS NOTES
Sarahy LAMB Seal 77 y.o. female is here today for Blood Pressure check.   History of HTN no.    Review of patient's allergies indicates:   Allergen Reactions    Demerol (pf) [meperidine (pf)] Nausea And Vomiting    Erythromycin Other (See Comments)     Stomach cramps    Lorabid [loracarbef] Nausea And Vomiting    Penicillin g Hives    Fosamax [alendronate] Other (See Comments)     Bone pain     Creatinine   Date Value Ref Range Status   06/19/2023 0.8 0.5 - 1.4 mg/dL Final     Sodium   Date Value Ref Range Status   06/19/2023 143 136 - 145 mmol/L Final     Potassium   Date Value Ref Range Status   06/19/2023 5.1 3.5 - 5.1 mmol/L Final   ]  Patient verifies taking blood pressure medications on a regular basis at the same time of the day.     Current Outpatient Medications:     ascorbic acid, vitamin C, (VITAMIN C) 1000 MG tablet, Take 1,000 mg by mouth once daily., Disp: , Rfl:     calcium carbonate (OS-KAYLIN) 600 mg calcium (1,500 mg) Tab, Take 600 mg by mouth once daily., Disp: , Rfl:     cetirizine (ZYRTEC) 10 MG tablet, Take 1 tablet (10 mg total) by mouth as needed for Allergies. For allergies, Disp: 90 tablet, Rfl: 1    denosumab (PROLIA) 60 mg/mL Syrg, Inject 60 mg into the skin every 6 (six) months. , Disp: , Rfl:     ergocalciferol, vitamin D2, (VITAMIN D ORAL), Take 5,000 Units by mouth once daily., Disp: , Rfl:     fish oil-omega-3 fatty acids 300-1,000 mg capsule, Take 1 capsule by mouth once daily. On hold, Disp: , Rfl:     fluticasone propionate (FLONASE) 50 mcg/actuation nasal spray, USE 1 SPRAY (50 MCG TOTAL) IN EACH NOSTRIL ONCE DAILY, Disp: 48 g, Rfl: 1    Lactobacillus rhamnosus GG (CULTURELLE) 10 billion cell capsule, Take 1 capsule by mouth once daily., Disp: , Rfl:     levothyroxine (SYNTHROID) 88 MCG tablet, Take 1 tablet (88 mcg total) by mouth before breakfast., Disp: 90 tablet, Rfl: 3    losartan (COZAAR) 25 MG tablet, Take 1 tablet (25 mg total) by mouth once daily., Disp: 90 tablet, Rfl: 3     multivitamin capsule, Take 1 capsule by mouth once daily., Disp: , Rfl:     TUMERIC-GING-OLIVE-OREG-CAPRYL ORAL, Take by mouth., Disp: , Rfl:     zinc gluconate 50 mg tablet, Take 50 mg by mouth once daily., Disp: , Rfl:   Does patient have record of home blood pressure readings yes. Readings have been averaging 136/66.   Last dose of blood pressure medication was taken at 7:30 am.  Patient is symptomatic.   Complains of N/A.    136/69  , 66  .    Blood pressure reading after 15 minutes was 136/69, Pulse 66.  Dr. Kassandra Jenkins,NP notified.

## 2023-12-19 ENCOUNTER — OFFICE VISIT (OUTPATIENT)
Dept: HEMATOLOGY/ONCOLOGY | Facility: CLINIC | Age: 78
End: 2023-12-19
Payer: MEDICARE

## 2023-12-19 ENCOUNTER — INFUSION (OUTPATIENT)
Dept: INFUSION THERAPY | Facility: HOSPITAL | Age: 78
End: 2023-12-19
Attending: NURSE PRACTITIONER
Payer: MEDICARE

## 2023-12-19 VITALS
SYSTOLIC BLOOD PRESSURE: 130 MMHG | DIASTOLIC BLOOD PRESSURE: 84 MMHG | OXYGEN SATURATION: 97 % | WEIGHT: 152.13 LBS | BODY MASS INDEX: 23.82 KG/M2 | HEART RATE: 64 BPM | RESPIRATION RATE: 19 BRPM

## 2023-12-19 VITALS
SYSTOLIC BLOOD PRESSURE: 130 MMHG | WEIGHT: 152.13 LBS | HEART RATE: 16 BPM | BODY MASS INDEX: 23.82 KG/M2 | TEMPERATURE: 98 F | RESPIRATION RATE: 18 BRPM | DIASTOLIC BLOOD PRESSURE: 84 MMHG

## 2023-12-19 DIAGNOSIS — M81.0 SENILE OSTEOPOROSIS: Primary | ICD-10-CM

## 2023-12-19 PROCEDURE — 99213 PR OFFICE/OUTPT VISIT, EST, LEVL III, 20-29 MIN: ICD-10-PCS | Mod: S$PBB,,, | Performed by: NURSE PRACTITIONER

## 2023-12-19 PROCEDURE — 63600175 PHARM REV CODE 636 W HCPCS: Mod: JZ,JG,PN | Performed by: NURSE PRACTITIONER

## 2023-12-19 PROCEDURE — 99999 PR PBB SHADOW E&M-EST. PATIENT-LVL III: ICD-10-PCS | Mod: PBBFAC,,, | Performed by: NURSE PRACTITIONER

## 2023-12-19 PROCEDURE — 99213 OFFICE O/P EST LOW 20 MIN: CPT | Mod: S$PBB,,, | Performed by: NURSE PRACTITIONER

## 2023-12-19 PROCEDURE — 96372 THER/PROPH/DIAG INJ SC/IM: CPT | Mod: PN

## 2023-12-19 PROCEDURE — 99999 PR PBB SHADOW E&M-EST. PATIENT-LVL III: CPT | Mod: PBBFAC,,, | Performed by: NURSE PRACTITIONER

## 2023-12-19 PROCEDURE — 99213 OFFICE O/P EST LOW 20 MIN: CPT | Mod: PBBFAC,PN | Performed by: NURSE PRACTITIONER

## 2023-12-19 RX ADMIN — DENOSUMAB 60 MG: 60 INJECTION SUBCUTANEOUS at 11:12

## 2023-12-19 NOTE — PROGRESS NOTES
Subjective:      Name: Sarahy Coronel  : 1945  MRN: [id number]    CC:  Prolia clearance    HPI:   Sarahy Coronel is a 78 y.o. female presents for evaluation of Osteoporosis for next Prolia.    Ms. Coronel is a 78-year-old white female known to Dr. Ricardo for Stage I (T1b, N0, M0) papillary right breast carcinoma.    Tumor was found to be ER/SD positive & [id number] negative.    She is status post right mastectomy (17) for residual disease per Dr. Starr.    Oncotype DX did not have enough tissue for analysis.    She was started on Arimidex on 2018 - discontinued on    She received her 1st Prolia injection on 2018.  Annual visits in .    The patient also carries the diagnosis of Osteoporosis that was diagnosed in ; last Prolia 10/07/2022     She returns to clinic today for evaluation prior to next Prolia.  She remains active and well.  No recent invasive dental procedures.  She denies any difficulties with chest pain, nipple discharge, unbearable hot flashes, fevers, chills, abdominal discomfort, nausea, vomiting, diarrhea, bleeding, etc.    No new complaints or pertinent findings on a 10-point review of systems.    Past Medical History:   Diagnosis Date    Actinic keratoses     Allergy     Anticoagulant long-term use     asa 81    Arthritis     Breast cancer 2017    r mastectomy    Chronic bronchitis     DVT (deep venous thrombosis)     leg, no precipitating factors, no longer on anticoagulants    Encounter for blood transfusion     ectopic pregnancy    Family history of complications due to general anesthesia     sister's daughter almost  from MALIGNANT HYPERTHERMIA ( pt denies adverse reaction)    GERD (gastroesophageal reflux disease)     no medication    Hypertension, isolated systolic 4/3/2023    Hypothyroidism     Leukopenia past Hx    Malignant hyperthermia     neice had a reaction to anesthesia- not sure if this was the same thing    Mitral regurgitation      mild,asymptomatic    Phlebitis     past Hx, legs    PONV (postoperative nausea and vomiting)     once only    Skin cancer     TMJ (dislocation of temporomandibular joint)        Past Surgical History:   Procedure Laterality Date    APPENDECTOMY      BREAST SURGERY      CHOLECYSTECTOMY      COLONOSCOPY      ECTOPIC PREGNANCY SURGERY      removal of ovary and tube prior to hysterectomy    ESOPHAGOGASTRODUODENOSCOPY      HYSTERECTOMY      KNEE SURGERY  8/14    meniscal repair    LIPOMA RESECTION      left shoulder    TOTAL ABDOMINAL HYSTERECTOMY W/ BILATERAL SALPINGOOPHORECTOMY  1970's    after ectopic pregnancy       Family History   Problem Relation Age of Onset    Breast cancer Other 40    Heart disease Mother     Cancer Father     Bladder Cancer Father     Cancer Maternal Uncle     Cancer Paternal Uncle     Breast cancer Maternal Aunt        Social History     Socioeconomic History    Marital status:    Tobacco Use    Smoking status: Never    Smokeless tobacco: Never   Substance and Sexual Activity    Alcohol use: No    Drug use: No    Sexual activity: Not Currently       Review of patient's allergies indicates:   Allergen Reactions    Demerol (pf) [meperidine (pf)] Nausea And Vomiting    Erythromycin Other (See Comments)     Stomach cramps    Lorabid [loracarbef] Nausea And Vomiting    Penicillin g Hives    Fosamax [alendronate] Other (See Comments)     Bone pain       Review of Systems   All other systems reviewed and are negative.           Objective:      Vitals:    12/19/23 1022   BP: 130/84   Pulse: 64   Resp: 19   SpO2: 97%   Weight: 69 kg (152 lb 1.9 oz)        Physical Exam  Vitals reviewed.   Constitutional:       General: She is not in acute distress.  HENT:      Head: Normocephalic and atraumatic.      Mouth/Throat:      Pharynx: Oropharynx is clear.   Eyes:      Conjunctiva/sclera: Conjunctivae normal.   Cardiovascular:      Rate and Rhythm: Normal rate and regular rhythm.      Pulses: Normal  pulses.      Heart sounds: No murmur heard.  Pulmonary:      Effort: Pulmonary effort is normal. No respiratory distress.   Chest:      Comments:    Abdominal:      General: Bowel sounds are normal. There is no distension.      Palpations: Abdomen is soft.      Tenderness: There is no abdominal tenderness.   Musculoskeletal:         General: Normal range of motion.      Cervical back: Neck supple.      Right lower leg: No edema.      Left lower leg: No edema.   Lymphadenopathy:      Cervical: No cervical adenopathy.   Skin:     General: Skin is warm and dry.   Neurological:      Mental Status: She is alert and oriented to person, place, and time.   Psychiatric:         Behavior: Behavior normal.         Thought Content: Thought content normal.             Current Outpatient Medications on File Prior to Visit   Medication Sig    ascorbic acid, vitamin C, (VITAMIN C) 1000 MG tablet Take 1,000 mg by mouth once daily.    calcium carbonate (OS-KAYLIN) 600 mg calcium (1,500 mg) Tab Take 600 mg by mouth once daily.    denosumab (PROLIA) 60 mg/mL Syrg Inject 60 mg into the skin every 6 (six) months.     ergocalciferol, vitamin D2, (VITAMIN D ORAL) Take 5,000 Units by mouth once daily.    fish oil-omega-3 fatty acids 300-1,000 mg capsule Take 1 capsule by mouth once daily. On hold    fluticasone propionate (FLONASE) 50 mcg/actuation nasal spray USE 1 SPRAY (50 MCG TOTAL) IN EACH NOSTRIL ONCE DAILY    Lactobacillus rhamnosus GG (CULTURELLE) 10 billion cell capsule Take 1 capsule by mouth once daily.    levothyroxine (SYNTHROID) 88 MCG tablet Take 1 tablet (88 mcg total) by mouth before breakfast.    losartan (COZAAR) 25 MG tablet Take 1 tablet (25 mg total) by mouth once daily.    multivitamin capsule Take 1 capsule by mouth once daily.    TUMERIC-GING-OLIVE-OREG-CAPRYL ORAL Take by mouth.    zinc gluconate 50 mg tablet Take 50 mg by mouth once daily.    cetirizine (ZYRTEC) 10 MG tablet Take 1 tablet (10 mg total) by mouth as  needed for Allergies. For allergies     No current facility-administered medications on file prior to visit.       BMP  Lab Results   Component Value Date     12/19/2023    K 4.5 12/19/2023     12/19/2023    CO2 26 12/19/2023    BUN 16 12/19/2023    CREATININE 0.8 12/19/2023    CALCIUM 10.3 12/19/2023    ANIONGAP 9 12/19/2023    EGFRNORACEVR >60.0 12/19/2023     Vitamin D:  pending    Mammo Digital Screening Left with Dax  Narrative: Result:  Mammo Digital Screening Left with Dax    History:  Patient is 77 y.o. and is seen for a screening mammogram.    Films Compared:  Compared to: 06/16/2022 Mammo Digital Screening Left with Dax (No   Change), 06/14/2021 Mammo Digital Diagnostic Left with Dax, and   06/01/2020 Mammo Digital Diagnostic Left w/ Dax     Findings:   This procedure was performed using tomosynthesis.   Computer-aided detection was utilized in the interpretation of this   examination.     The patient is status post contralateral mastectomy.  Only the remaining   breast has been imaged.    The left breast has scattered areas of fibroglandular density. There is no   evidence of suspicious masses, microcalcifications or architectural   distortion.  Impression:    No mammographic evidence of malignancy.    BI-RADS Category 1: Negative    Recommendation:  Routine screening mammogram in 1 year is recommended.        All pertinent labs reviewed.    Assessment:       1. Senile osteoporosis         Plan:     Senile osteoporosis    Osteoporosis - Proceed with Prolia today; f/u in 6 months with annual breast  Hx of right breast cancer -  f/u in 06/21/24 with CBC, CMP, VIT D, BMD & Mammo (on or after 06/19/24)  Call for any concerns.    Route Chart for Scheduling    Med Onc Chart Routing      Follow up with physician    Follow up with [facility] . F/u in June as scheduled for annual breast & Prolia   Infusion scheduling note    Injection scheduling note Proceed with Prolia today   Labs    Imaging   Need  Mammo on or after 06/19/24 - please change BMD to be on same day due to distance traveling   Pharmacy appointment    Other referrals                25 minutes were spent in coordination of patient's care, record review and counseling.

## 2023-12-20 ENCOUNTER — TELEPHONE (OUTPATIENT)
Dept: HEMATOLOGY/ONCOLOGY | Facility: CLINIC | Age: 78
End: 2023-12-20
Payer: MEDICARE

## 2023-12-20 NOTE — TELEPHONE ENCOUNTER
Pt returned phone call and verbalized understanding of decreasing Vit D dose, as directed by Hernandez Zamora NP.    Placed phone call to pt, to instruct her per Hernandez Zamora NP's directions to lower her Vit D dose, but no answer after several attempts, Voice mail left with directions, and requested return call to clinic to let us know she received the message.  Also attempted to reach son's phone.    ----- Message from Hernandez Zamora NP sent at 12/20/2023  8:08 AM CST -----  Regarding: Elevated Vitamin D  Please call Ms. Coronel & let her know that her Vitamin D is a little high & to cut back on her Vitamin D supplementation @ least 1,000 units/day.    Thank you,  Hernandez  ----- Message -----  From: [facility]  Sent: 12/19/2023  10:28 AM CST  To: Hernandez Zamora NP

## 2023-12-26 ENCOUNTER — TELEPHONE (OUTPATIENT)
Dept: FAMILY MEDICINE | Facility: CLINIC | Age: 78
End: 2023-12-26
Payer: MEDICARE

## 2023-12-26 NOTE — TELEPHONE ENCOUNTER
----- Message from Cheng Mccullough sent at 12/26/2023  9:42 AM CST -----  Type: Needs Medical Advice  Who Called:  pt  Symptoms (please be specific):  pt said she need tos peak to the nurse--she need to know if she can have some meds call into the pharmacy--she sick and been coughing a lot--please call and advise--  Best Call Back Number: [phone] (home)     [facility]/pharmacy #[id number] - [place] - [address]  [address] [place] [address] AVE.  [place] [id number]  Phone: [phone] Fax: [phone]      Additional Information: thank you

## 2023-12-26 NOTE — TELEPHONE ENCOUNTER
Spoke with the pt and she was given the recommendation to try coricidin hbp for her symptoms. Pt was told that if she didn't get better to call the clinic back and we can try to get her in with the provider in [place]

## 2024-02-08 ENCOUNTER — OFFICE VISIT (OUTPATIENT)
Dept: FAMILY MEDICINE | Facility: CLINIC | Age: 79
End: 2024-02-08
Payer: MEDICARE

## 2024-02-08 VITALS
DIASTOLIC BLOOD PRESSURE: 78 MMHG | SYSTOLIC BLOOD PRESSURE: 128 MMHG | OXYGEN SATURATION: 97 % | BODY MASS INDEX: 23.36 KG/M2 | WEIGHT: 148.81 LBS | HEART RATE: 71 BPM | HEIGHT: 67 IN

## 2024-02-08 DIAGNOSIS — I10 PRIMARY HYPERTENSION: Primary | ICD-10-CM

## 2024-02-08 DIAGNOSIS — E03.9 ACQUIRED HYPOTHYROIDISM: ICD-10-CM

## 2024-02-08 PROBLEM — Z17.0 MALIGNANT NEOPLASM OF RIGHT BREAST IN FEMALE, ESTROGEN RECEPTOR POSITIVE: Status: RESOLVED | Noted: 2017-11-16 | Resolved: 2024-02-08

## 2024-02-08 PROBLEM — C50.911 MALIGNANT NEOPLASM OF RIGHT BREAST IN FEMALE, ESTROGEN RECEPTOR POSITIVE: Status: RESOLVED | Noted: 2017-11-16 | Resolved: 2024-02-08

## 2024-02-08 PROCEDURE — 99999 PR PBB SHADOW E&M-EST. PATIENT-LVL III: CPT | Mod: PBBFAC,,, | Performed by: FAMILY MEDICINE

## 2024-02-08 PROCEDURE — 99214 OFFICE O/P EST MOD 30 MIN: CPT | Mod: S$PBB,,, | Performed by: FAMILY MEDICINE

## 2024-02-08 PROCEDURE — 99213 OFFICE O/P EST LOW 20 MIN: CPT | Mod: PBBFAC,PO | Performed by: FAMILY MEDICINE

## 2024-02-08 RX ORDER — LEVOTHYROXINE SODIUM 88 UG/1
88 TABLET ORAL
Qty: 90 TABLET | Refills: 3 | Status: SHIPPED | OUTPATIENT
Start: 2024-02-08 | End: 2025-02-07

## 2024-02-08 RX ORDER — LOSARTAN POTASSIUM 25 MG/1
25 TABLET ORAL DAILY
Qty: 90 TABLET | Refills: 3 | Status: SHIPPED | OUTPATIENT
Start: 2024-02-08 | End: 2025-02-07

## 2024-02-08 NOTE — PROGRESS NOTES
Subjective:       Patient ID: Sarahy Coronel is a 78 y.o. female.    Chief Complaint: Establish Care (6 month )    Here for follow up multiple chronic medical issues. Doing well overall and in normal state of health.  Due for labs.        Review of Systems   Constitutional:  Negative for chills and fever.   Respiratory:  Negative for cough, chest tightness and shortness of breath.    Cardiovascular:  Negative for chest pain, palpitations and leg swelling.   Endocrine: Negative for cold intolerance and heat intolerance.   Psychiatric/Behavioral:  Negative for decreased concentration. The patient is not nervous/anxious.        Objective:      Physical Exam  Vitals and nursing note reviewed.   Constitutional:       Appearance: She is well-developed.   HENT:      Head: Normocephalic and atraumatic.   Cardiovascular:      Rate and Rhythm: Normal rate and regular rhythm.      Heart sounds: Normal heart sounds.   Pulmonary:      Effort: Pulmonary effort is normal.      Breath sounds: Normal breath sounds.   Psychiatric:         Mood and Affect: Mood normal.         Behavior: Behavior normal.         Assessment:       1. Primary hypertension    2. Acquired hypothyroidism        Plan:       Primary hypertension  -     CBC Without Differential; Future; Expected date: 02/08/2024  -     Comprehensive Metabolic Panel; Future; Expected date: 02/08/2024  -     Lipid Panel; Future; Expected date: 02/08/2024  -     TSH; Future; Expected date: 02/08/2024    Acquired hypothyroidism  -     levothyroxine (SYNTHROID) 88 MCG tablet; Take 1 tablet (88 mcg total) by mouth before breakfast.  Dispense: 90 tablet; Refill: 3    Other orders  -     losartan (COZAAR) 25 MG tablet; Take 1 tablet (25 mg total) by mouth once daily.  Dispense: 90 tablet; Refill: 3      Htn stable  Hypothyroid stable  Will monitor chronic medical issues and continue current plan of care.  Update labs and health maintenance    Follow up in about 6 months (around  8/8/2024), or if symptoms worsen or fail to improve.

## 2024-05-02 ENCOUNTER — LAB VISIT (OUTPATIENT)
Dept: PRIMARY CARE CLINIC | Facility: CLINIC | Age: 79
End: 2024-05-02
Payer: MEDICARE

## 2024-05-02 DIAGNOSIS — I10 PRIMARY HYPERTENSION: ICD-10-CM

## 2024-05-02 LAB
ALBUMIN SERPL BCP-MCNC: 3.9 G/DL (ref 3.5–5.2)
ALP SERPL-CCNC: 46 U/L (ref 55–135)
ALT SERPL W/O P-5'-P-CCNC: 16 U/L (ref 10–44)
ANION GAP SERPL CALC-SCNC: 7 MMOL/L (ref 8–16)
AST SERPL-CCNC: 18 U/L (ref 10–40)
BILIRUB SERPL-MCNC: 0.5 MG/DL (ref 0.1–1)
BUN SERPL-MCNC: 22 MG/DL (ref 8–23)
CALCIUM SERPL-MCNC: 9.8 MG/DL (ref 8.7–10.5)
CHLORIDE SERPL-SCNC: 108 MMOL/L (ref 95–110)
CHOLEST SERPL-MCNC: 195 MG/DL (ref 120–199)
CHOLEST/HDLC SERPL: 3.4 {RATIO} (ref 2–5)
CO2 SERPL-SCNC: 26 MMOL/L (ref 23–29)
CREAT SERPL-MCNC: 0.9 MG/DL (ref 0.5–1.4)
ERYTHROCYTE [DISTWIDTH] IN BLOOD BY AUTOMATED COUNT: 12.1 % (ref 11.5–14.5)
EST. GFR  (NO RACE VARIABLE): >60 ML/MIN/1.73 M^2
GLUCOSE SERPL-MCNC: 93 MG/DL (ref 70–110)
HCT VFR BLD AUTO: 36.7 % (ref 37–48.5)
HDLC SERPL-MCNC: 58 MG/DL (ref 40–75)
HDLC SERPL: 29.7 % (ref 20–50)
HGB BLD-MCNC: 12.3 G/DL (ref 12–16)
LDLC SERPL CALC-MCNC: 122.6 MG/DL (ref 63–159)
MCH RBC QN AUTO: 30.9 PG (ref 27–31)
MCHC RBC AUTO-ENTMCNC: 33.5 G/DL (ref 32–36)
MCV RBC AUTO: 92 FL (ref 82–98)
NONHDLC SERPL-MCNC: 137 MG/DL
PLATELET # BLD AUTO: 192 K/UL (ref 150–450)
PMV BLD AUTO: 11.6 FL (ref 9.2–12.9)
POTASSIUM SERPL-SCNC: 4.7 MMOL/L (ref 3.5–5.1)
PROT SERPL-MCNC: 6.8 G/DL (ref 6–8.4)
RBC # BLD AUTO: 3.98 M/UL (ref 4–5.4)
SODIUM SERPL-SCNC: 141 MMOL/L (ref 136–145)
TRIGL SERPL-MCNC: 72 MG/DL (ref 30–150)
TSH SERPL DL<=0.005 MIU/L-ACNC: 1.04 UIU/ML (ref 0.4–4)
WBC # BLD AUTO: 4.85 K/UL (ref 3.9–12.7)

## 2024-05-02 PROCEDURE — 85027 COMPLETE CBC AUTOMATED: CPT | Performed by: FAMILY MEDICINE

## 2024-05-02 PROCEDURE — 36415 COLL VENOUS BLD VENIPUNCTURE: CPT | Mod: S$GLB,,, | Performed by: FAMILY MEDICINE

## 2024-05-02 PROCEDURE — 84443 ASSAY THYROID STIM HORMONE: CPT | Performed by: FAMILY MEDICINE

## 2024-05-02 PROCEDURE — 80053 COMPREHEN METABOLIC PANEL: CPT | Performed by: FAMILY MEDICINE

## 2024-05-02 PROCEDURE — 80061 LIPID PANEL: CPT | Performed by: FAMILY MEDICINE

## 2024-06-18 ENCOUNTER — HOSPITAL ENCOUNTER (OUTPATIENT)
Dept: RADIOLOGY | Facility: HOSPITAL | Age: 79
Discharge: HOME | End: 2024-06-18
Attending: NURSE PRACTITIONER
Payer: MEDICARE

## 2024-06-18 DIAGNOSIS — Z90.11 S/P RIGHT MASTECTOMY: ICD-10-CM

## 2024-06-18 DIAGNOSIS — C50.911 MALIGNANT NEOPLASM OF RIGHT BREAST IN FEMALE, ESTROGEN RECEPTOR POSITIVE, UNSPECIFIED SITE OF BREAST: ICD-10-CM

## 2024-06-18 DIAGNOSIS — M81.6 LOCALIZED OSTEOPOROSIS (LEQUESNE): ICD-10-CM

## 2024-06-18 DIAGNOSIS — Z17.0 MALIGNANT NEOPLASM OF RIGHT BREAST IN FEMALE, ESTROGEN RECEPTOR POSITIVE, UNSPECIFIED SITE OF BREAST: ICD-10-CM

## 2024-06-18 DIAGNOSIS — Z17.0 MALIGNANT NEOPLASM OF OVERLAPPING SITES OF RIGHT BREAST IN FEMALE, ESTROGEN RECEPTOR POSITIVE: ICD-10-CM

## 2024-06-18 DIAGNOSIS — C50.811 MALIGNANT NEOPLASM OF OVERLAPPING SITES OF RIGHT BREAST IN FEMALE, ESTROGEN RECEPTOR POSITIVE: ICD-10-CM

## 2024-06-18 PROCEDURE — 77080 DXA BONE DENSITY AXIAL: CPT | Mod: 26,,, | Performed by: RADIOLOGY

## 2024-06-18 PROCEDURE — 77061 BREAST TOMOSYNTHESIS UNI: CPT | Mod: TC,PO,LT

## 2024-06-18 PROCEDURE — 77080 DXA BONE DENSITY AXIAL: CPT | Mod: TC,PO

## 2024-06-21 ENCOUNTER — OFFICE VISIT (OUTPATIENT)
Dept: HEMATOLOGY/ONCOLOGY | Facility: CLINIC | Age: 79
End: 2024-06-21
Payer: MEDICARE

## 2024-06-21 ENCOUNTER — LAB VISIT (OUTPATIENT)
Dept: LAB | Facility: HOSPITAL | Age: 79
End: 2024-06-21
Attending: FAMILY MEDICINE
Payer: MEDICARE

## 2024-06-21 VITALS
WEIGHT: 152.75 LBS | TEMPERATURE: 96 F | HEIGHT: 66 IN | RESPIRATION RATE: 18 BRPM | OXYGEN SATURATION: 96 % | BODY MASS INDEX: 24.55 KG/M2 | DIASTOLIC BLOOD PRESSURE: 68 MMHG | SYSTOLIC BLOOD PRESSURE: 151 MMHG | HEART RATE: 53 BPM

## 2024-06-21 DIAGNOSIS — Z85.3 HISTORY OF RIGHT BREAST CANCER: Primary | ICD-10-CM

## 2024-06-21 DIAGNOSIS — M89.9 DISORDER OF BONE AND CARTILAGE: ICD-10-CM

## 2024-06-21 DIAGNOSIS — M81.0 SENILE OSTEOPOROSIS: ICD-10-CM

## 2024-06-21 DIAGNOSIS — M85.80 OTHER SPECIFIED DISORDERS OF BONE DENSITY AND STRUCTURE, UNSPECIFIED SITE: ICD-10-CM

## 2024-06-21 DIAGNOSIS — Z17.0 MALIGNANT NEOPLASM OF RIGHT BREAST IN FEMALE, ESTROGEN RECEPTOR POSITIVE, UNSPECIFIED SITE OF BREAST: ICD-10-CM

## 2024-06-21 DIAGNOSIS — Z90.11 S/P RIGHT MASTECTOMY: ICD-10-CM

## 2024-06-21 DIAGNOSIS — Z12.31 ENCOUNTER FOR SCREENING MAMMOGRAM FOR MALIGNANT NEOPLASM OF BREAST: ICD-10-CM

## 2024-06-21 DIAGNOSIS — M94.9 DISORDER OF BONE AND CARTILAGE: ICD-10-CM

## 2024-06-21 DIAGNOSIS — C50.911 MALIGNANT NEOPLASM OF RIGHT BREAST IN FEMALE, ESTROGEN RECEPTOR POSITIVE, UNSPECIFIED SITE OF BREAST: ICD-10-CM

## 2024-06-21 LAB
25(OH)D3+25(OH)D2 SERPL-MCNC: 56 NG/ML (ref 30–96)
ALBUMIN SERPL BCP-MCNC: 3.7 G/DL (ref 3.5–5.2)
ALP SERPL-CCNC: 46 U/L (ref 55–135)
ALT SERPL W/O P-5'-P-CCNC: 15 U/L (ref 10–44)
ANION GAP SERPL CALC-SCNC: 10 MMOL/L (ref 8–16)
AST SERPL-CCNC: 16 U/L (ref 10–40)
BASOPHILS # BLD AUTO: 0.03 K/UL (ref 0–0.2)
BASOPHILS NFR BLD: 0.6 % (ref 0–1.9)
BILIRUB SERPL-MCNC: 0.3 MG/DL (ref 0.1–1)
BUN SERPL-MCNC: 20 MG/DL (ref 8–23)
CALCIUM SERPL-MCNC: 10.5 MG/DL (ref 8.7–10.5)
CHLORIDE SERPL-SCNC: 108 MMOL/L (ref 95–110)
CO2 SERPL-SCNC: 25 MMOL/L (ref 23–29)
CREAT SERPL-MCNC: 0.9 MG/DL (ref 0.5–1.4)
DIFFERENTIAL METHOD BLD: ABNORMAL
EOSINOPHIL # BLD AUTO: 0.1 K/UL (ref 0–0.5)
EOSINOPHIL NFR BLD: 1.2 % (ref 0–8)
ERYTHROCYTE [DISTWIDTH] IN BLOOD BY AUTOMATED COUNT: 12.5 % (ref 11.5–14.5)
EST. GFR  (NO RACE VARIABLE): >60 ML/MIN/1.73 M^2
GLUCOSE SERPL-MCNC: 97 MG/DL (ref 70–110)
HCT VFR BLD AUTO: 34.5 % (ref 37–48.5)
HGB BLD-MCNC: 11.9 G/DL (ref 12–16)
IMM GRANULOCYTES # BLD AUTO: 0.01 K/UL (ref 0–0.04)
IMM GRANULOCYTES NFR BLD AUTO: 0.2 % (ref 0–0.5)
LYMPHOCYTES # BLD AUTO: 1.5 K/UL (ref 1–4.8)
LYMPHOCYTES NFR BLD: 29.7 % (ref 18–48)
MCH RBC QN AUTO: 31.3 PG (ref 27–31)
MCHC RBC AUTO-ENTMCNC: 34.5 G/DL (ref 32–36)
MCV RBC AUTO: 91 FL (ref 82–98)
MONOCYTES # BLD AUTO: 0.5 K/UL (ref 0.3–1)
MONOCYTES NFR BLD: 9.1 % (ref 4–15)
NEUTROPHILS # BLD AUTO: 3.1 K/UL (ref 1.8–7.7)
NEUTROPHILS NFR BLD: 59.2 % (ref 38–73)
NRBC BLD-RTO: 0 /100 WBC
PLATELET # BLD AUTO: 182 K/UL (ref 150–450)
PMV BLD AUTO: 10.6 FL (ref 9.2–12.9)
POTASSIUM SERPL-SCNC: 4.9 MMOL/L (ref 3.5–5.1)
PROT SERPL-MCNC: 6.7 G/DL (ref 6–8.4)
RBC # BLD AUTO: 3.8 M/UL (ref 4–5.4)
SODIUM SERPL-SCNC: 143 MMOL/L (ref 136–145)
WBC # BLD AUTO: 5.16 K/UL (ref 3.9–12.7)

## 2024-06-21 PROCEDURE — 36415 COLL VENOUS BLD VENIPUNCTURE: CPT | Mod: PN | Performed by: NURSE PRACTITIONER

## 2024-06-21 PROCEDURE — 85025 COMPLETE CBC W/AUTO DIFF WBC: CPT | Mod: PN | Performed by: NURSE PRACTITIONER

## 2024-06-21 PROCEDURE — 82306 VITAMIN D 25 HYDROXY: CPT | Performed by: NURSE PRACTITIONER

## 2024-06-21 PROCEDURE — 80053 COMPREHEN METABOLIC PANEL: CPT | Mod: PN | Performed by: NURSE PRACTITIONER

## 2024-06-21 PROCEDURE — 99999 PR PBB SHADOW E&M-EST. PATIENT-LVL IV: CPT | Mod: PBBFAC,,, | Performed by: NURSE PRACTITIONER

## 2024-06-21 PROCEDURE — 99214 OFFICE O/P EST MOD 30 MIN: CPT | Mod: PBBFAC,PN | Performed by: NURSE PRACTITIONER

## 2024-06-21 RX ORDER — HEPARIN 100 UNIT/ML
500 SYRINGE INTRAVENOUS
OUTPATIENT
Start: 2024-06-21

## 2024-06-21 RX ORDER — SODIUM CHLORIDE 0.9 % (FLUSH) 0.9 %
10 SYRINGE (ML) INJECTION
OUTPATIENT
Start: 2024-06-21

## 2024-06-21 RX ORDER — ZOLEDRONIC ACID 5 MG/100ML
5 INJECTION, SOLUTION INTRAVENOUS
OUTPATIENT
Start: 2024-06-21

## 2024-06-21 NOTE — PROGRESS NOTES
Subjective:      Name: Sarahy Coronel  : 1945  MRN: [id number]    CC:  74 month post breast surveillance/Prolia    HPI:   Sarahy Coronel is a 78 y.o. female presents for annual breast evaluation & next Prolia.    The patient is a 78-year-old white female known to Dr. Ricardo for Stage I (T1b, N0, M0) papillary right breast carcinoma.    Tumor was found to be ER/AZ positive & Tud6rmg negative.    She is status post right mastectomy (17) for residual disease per Dr. Starr.    Oncotype DX did not have enough tissue for analysis.    She was started on Arimidex on 2018 - discontinued on    She received her 1st Prolia injection on 2018.    The patient also carries the diagnosis of Osteoporosis that was diagnosed in ; last Prolia 23     She returns to clinic today for her 74 month post breast evaluation, review of imaging & next Prolia.  She has not been working in the yard as much due to the heat & taking care of her sister. She remains active and well.   She denies any difficulties with chest pain, nipple discharge, unbearable hot flashes, fevers, chills, abdominal discomfort, nausea, vomiting, diarrhea, bleeding, invasive dental work, etc.    No new complaints or pertinent findings on a 14-point review of systems.    Past Medical History:   Diagnosis Date    Actinic keratoses     Allergy     Anticoagulant long-term use     asa 83    Arthritis     Breast cancer 2017    r mastectomy    Chronic bronchitis     DVT (deep venous thrombosis)     leg, no precipitating factors, no longer on anticoagulants    Encounter for blood transfusion     ectopic pregnancy    Family history of complications due to general anesthesia     sister's daughter almost  from MALIGNANT HYPERTHERMIA ( pt denies adverse reaction)    GERD (gastroesophageal reflux disease)     no medication    Hypertension, isolated systolic 4/3/2023    Hypothyroidism     Leukopenia past Hx    Malignant hyperthermia      "lyssa had a reaction to anesthesia- not sure if this was the same thing    Mitral regurgitation     mild,asymptomatic    Phlebitis     past Hx, legs    PONV (postoperative nausea and vomiting)     once only    Skin cancer     TMJ (dislocation of temporomandibular joint)        Past Surgical History:   Procedure Laterality Date    APPENDECTOMY      BREAST SURGERY      CHOLECYSTECTOMY      COLONOSCOPY      ECTOPIC PREGNANCY SURGERY      removal of ovary and tube prior to hysterectomy    ESOPHAGOGASTRODUODENOSCOPY      HYSTERECTOMY      KNEE SURGERY  8/14    meniscal repair    LIPOMA RESECTION      left shoulder    TOTAL ABDOMINAL HYSTERECTOMY W/ BILATERAL SALPINGOOPHORECTOMY  1970's    after ectopic pregnancy       Family History   Problem Relation Name Age of Onset    Breast cancer Other niece 40    Heart disease Mother      Cancer Father      Bladder Cancer Father      Cancer Maternal Uncle      Cancer Paternal Uncle      Breast cancer Maternal Aunt         Social History     Socioeconomic History    Marital status:    Tobacco Use    Smoking status: Never    Smokeless tobacco: Never   Substance and Sexual Activity    Alcohol use: No    Drug use: No    Sexual activity: Not Currently       Review of patient's allergies indicates:   Allergen Reactions    Demerol (pf) [meperidine (pf)] Nausea And Vomiting    Erythromycin Other (See Comments)     Stomach cramps    Lorabid [loracarbef] Nausea And Vomiting    Penicillin g Hives    Fosamax [alendronate] Other (See Comments)     Bone pain       Review of Systems   All other systems reviewed and are negative.         Objective:    Weight:  Gain of 1/2 pound in 6 months  Vitals:    06/21/24 1032   BP: (!) 151/68   BP Location: Right arm   Patient Position: Sitting   BP Method: Medium (Automatic)   Pulse: (!) 53   Resp: 18   Temp: 96.1 °F (35.6 °C)   TempSrc: Temporal   SpO2: 96%   Weight: 69.3 kg (152 lb 12.5 oz)   Height: 5' 6" (1.676 m)        Physical Exam  Vitals " reviewed.   Constitutional:       General: She is not in acute distress.  HENT:      Head: Normocephalic and atraumatic.      Mouth/Throat:      Pharynx: Oropharynx is clear.   Eyes:      Conjunctiva/sclera: Conjunctivae normal.   Cardiovascular:      Rate and Rhythm: Regular rhythm. Bradycardia present.      Pulses: Normal pulses.      Heart sounds: No murmur heard.  Pulmonary:      Effort: Pulmonary effort is normal. No respiratory distress.   Chest:      Comments: BREASTS:  Right breast mastectomy with no signs of reoccurrence about the chest wall.  Left breast soft, free of masses, tenderness, nipple discharge or inversion.    Abdominal:      General: Bowel sounds are normal. There is no distension.      Palpations: Abdomen is soft.      Tenderness: There is no abdominal tenderness.   Musculoskeletal:         General: Normal range of motion.      Cervical back: Neck supple.      Left lower leg: No edema.   Lymphadenopathy:      Cervical: No cervical adenopathy.   Skin:     General: Skin is warm and dry.   Neurological:      Mental Status: She is alert and oriented to person, place, and time.   Psychiatric:         Behavior: Behavior normal.         Thought Content: Thought content normal.           Current Outpatient Medications on File Prior to Visit   Medication Sig    ascorbic acid, vitamin C, (VITAMIN C) 1000 MG tablet Take 1,000 mg by mouth once daily.    calcium carbonate (OS-KAYLIN) 600 mg calcium (1,500 mg) Tab Take 600 mg by mouth once daily.    denosumab (PROLIA) 60 mg/mL Syrg Inject 60 mg into the skin every 6 (six) months.     ergocalciferol, vitamin D2, (VITAMIN D ORAL) Take 5,000 Units by mouth once daily.    fish oil-omega-3 fatty acids 300-1,000 mg capsule Take 1 capsule by mouth once daily. On hold    Lactobacillus rhamnosus GG (CULTURELLE) 10 billion cell capsule Take 1 capsule by mouth once daily.    levothyroxine (SYNTHROID) 88 MCG tablet Take 1 tablet (88 mcg total) by mouth before breakfast.     losartan (COZAAR) 25 MG tablet Take 1 tablet (25 mg total) by mouth once daily.    multivitamin capsule Take 1 capsule by mouth once daily.    TUMERIC-GING-OLIVE-OREG-CAPRYL ORAL Take by mouth.    cetirizine (ZYRTEC) 10 MG tablet Take 1 tablet (10 mg total) by mouth as needed for Allergies. For allergies    fluticasone propionate (FLONASE) 50 mcg/actuation nasal spray USE 1 SPRAY (50 MCG TOTAL) IN EACH NOSTRIL ONCE DAILY (Patient not taking: Reported on 6/21/2024)    zinc gluconate 50 mg tablet Take 50 mg by mouth once daily. (Patient not taking: Reported on 6/21/2024)     No current facility-administered medications on file prior to visit.       CBC:  Lab Results   Component Value Date    WBC 5.16 06/21/2024    HGB 11.9 (L) 06/21/2024    HCT 34.5 (L) 06/21/2024    MCV 91 06/21/2024     06/21/2024     CMP:  Sodium   Date Value Ref Range Status   06/21/2024 143 136 - 145 mmol/L Final     Potassium   Date Value Ref Range Status   06/21/2024 4.9 3.5 - 5.1 mmol/L Final     Chloride   Date Value Ref Range Status   06/21/2024 108 95 - 110 mmol/L Final     CO2   Date Value Ref Range Status   06/21/2024 25 23 - 29 mmol/L Final     Glucose   Date Value Ref Range Status   06/21/2024 97 70 - 110 mg/dL Final     BUN   Date Value Ref Range Status   06/21/2024 20 8 - 23 mg/dL Final     Creatinine   Date Value Ref Range Status   06/21/2024 0.9 0.5 - 1.4 mg/dL Final     Calcium   Date Value Ref Range Status   06/21/2024 10.5 8.7 - 10.5 mg/dL Final     Total Protein   Date Value Ref Range Status   06/21/2024 6.7 6.0 - 8.4 g/dL Final     Albumin   Date Value Ref Range Status   06/21/2024 3.7 3.5 - 5.2 g/dL Final     Total Bilirubin   Date Value Ref Range Status   06/21/2024 0.3 0.1 - 1.0 mg/dL Final     Comment:     For infants and newborns, interpretation of results should be based  on gestational age, weight and in agreement with clinical  observations.    Premature Infant recommended reference ranges:  Up to 24  hours.............<8.0 mg/dL  Up to 48 hours............<12.0 mg/dL  3-5 days..................<15.0 mg/dL  6-29 days.................<15.0 mg/dL       Alkaline Phosphatase   Date Value Ref Range Status   06/21/2024 46 (L) 55 - 135 U/L Final     AST   Date Value Ref Range Status   06/21/2024 16 10 - 40 U/L Final     ALT   Date Value Ref Range Status   06/21/2024 15 10 - 44 U/L Final     Anion Gap   Date Value Ref Range Status   06/21/2024 10 8 - 16 mmol/L Final     eGFR if    Date Value Ref Range Status   03/28/2022 >60 >60 mL/min/1.73 m^2 Final     eGFR if non    Date Value Ref Range Status   03/28/2022 >60 >60 mL/min/1.73 m^2 Final     Comment:     Calculation used to obtain the estimated glomerular filtration  rate (eGFR) is the CKD-EPI equation.        Vitamin D:  pending today    06/18/24  DXA Bone Density Axial Skeleton 1 or more sites  Narrative: EXAMINATION:  DXA BONE DENSITY AXIAL SKELETON 1 OR MORE SITES    CLINICAL HISTORY:  Malignant neoplasm of unspecified site of right female breast    TECHNIQUE:  DXA scanning was performed over the left hip and lumbar spine.  Review of the images confirms satisfactory positioning and technique.    COMPARISON:  DEXA scan-03/28/2022    FINDINGS:  The L1-L4 vertebral bone mineral density is equal to 1.004 g/cm squared with a T score of -0.4.  There has been a 0.3% non statistically significant interval increase in bone mineral density of the lumbar spine relative to the prior study.    The left femoral neck bone mineral density is equal to 0.592 g/cm squared with a T score of -2.3.  There has been  a 12.6% statistically significant interval decrease in bone mineral density of the left femoral neck relative to the prior study.    There is a 15% risk of a major osteoporotic fracture and a 4.6% risk of hip fracture in the next 10 years (FRAX).  Impression: Osteopenia    Consider FDA approved medical therapies in postmenopausal women and men  aged 50 years and older, based on the following:    *A hip or vertebral (clinical or morphometric) fracture  *T score less than or equal to -2.5 at the femoral neck or spine after appropriate evaluation to exclude secondary causes.  *Low bone mass -- also known as osteopenia (T score between -1.0 and -2.5 at the femoral neck or spine) and a 10 year probability of hip fracture greater than or equal to 3% or a 10 year probability of major osteoporosis-related fracture greater than or equal to 20% based on the [place]-adapted WHO algorithm.  *Clinicians judgment and/or patient preference may indicate treatment for people with 10 year fracture probabilities is above or below these levels.    Electronically signed by: Avel Mayen MD  Date:    06/18/2024  Time:    11:08  Mammo Digital Diagnostic Left with Dax  Narrative: Result:  Mammo Digital Diagnostic Left with Dax    History:  Patient is 78 y.o. and is seen for diagnostic imaging.    Films Compared:  Compared to: 06/19/2023 Mammo Digital Screening Left with Dax, 06/16/2022   Mammo Digital Screening Left with Dax, and 06/14/2021 Mammo Digital   Diagnostic Left with Dax     Findings:  This procedure was performed using tomosynthesis.   Computer-aided detection was utilized in the interpretation of this   examination.    The left breast has scattered areas of fibroglandular density. There is no   evidence of suspicious masses, microcalcifications or architectural   distortion.  Impression:    No mammographic evidence of malignancy.    BI-RADS Category 1: Negative    Recommendation:  Routine screening mammogram in 1 year is recommended.    BMD:  Osteopenia with significant loss of bone density in left femoral neck; lumbar spine stable  -on Prolia x 6 years; now decline in bone density    All pertinent labs & imaging reviewed.    Assessment:       1. History of right breast cancer    2. S/P right mastectomy    3. Senile osteoporosis         Plan:     History of right  breast cancer    S/P right mastectomy    Senile osteoporosis    Other orders  -     zoledronic acid-mannitol & water 5 mg/100 mL infusion 5 mg  -     heparin, porcine (PF) 100 unit/mL injection flush 500 Units  -     sodium chloride 0.9% flush 10 mL  -     0.9% NaCl 100 mL flush bag       Hx of right breast cancer - DEYVI @ 74 months post tx; f/u in 1 year (post 1st Reclast infusion) with CBC, CMP, VIT D & Mammo, left (on or after 06/18/25)  Osteoporosis - on Prolia x 6 years; now significant decline in left femoral neck; discussed treatment options with patient.  Submitted for Reclast. Maintain good hydration before & after infusion.  Call for any concerns.    YOSI Barba, FNP-C  [facility]  30 minutes were spent in coordination of patient's care, record review and counseling.    Route Chart for Scheduling    Med Onc Chart Routing      Follow up with physician    Follow up with [facility] 1 year. f/u in 1 year on same day as #2 Reclast with CBC, CMP, Vitamin D, Mammo, left   Infusion scheduling note New or changed treatment   REclast submitted   Injection scheduling note Prolia cancelled   Labs    Imaging    Pharmacy appointment    Other referrals

## 2024-06-24 ENCOUNTER — TELEPHONE (OUTPATIENT)
Dept: INFUSION THERAPY | Facility: HOSPITAL | Age: 79
End: 2024-06-24
Payer: MEDICARE

## 2024-06-24 NOTE — TELEPHONE ENCOUNTER
----- Message from Karlie Santana, Patient Care Assistant sent at 6/24/2024 11:36 AM CDT -----  Type: Needs Medical Advice  Who Called:  Sarahy  Best Call Back Number: [phone]    Additional Information: Sarahy wants to know if 7/2 she can have her infusion since she will be in the area that day , please call to further discuss thank you .

## 2024-07-02 ENCOUNTER — INFUSION (OUTPATIENT)
Dept: INFUSION THERAPY | Facility: HOSPITAL | Age: 79
End: 2024-07-02
Attending: NURSE PRACTITIONER
Payer: MEDICARE

## 2024-07-02 VITALS
RESPIRATION RATE: 16 BRPM | BODY MASS INDEX: 24.48 KG/M2 | SYSTOLIC BLOOD PRESSURE: 153 MMHG | DIASTOLIC BLOOD PRESSURE: 67 MMHG | HEIGHT: 66 IN | OXYGEN SATURATION: 100 % | HEART RATE: 51 BPM | WEIGHT: 152.31 LBS | TEMPERATURE: 98 F

## 2024-07-02 DIAGNOSIS — M81.0 SENILE OSTEOPOROSIS: Primary | ICD-10-CM

## 2024-07-02 PROCEDURE — 25000003 PHARM REV CODE 250: Mod: PN | Performed by: NURSE PRACTITIONER

## 2024-07-02 PROCEDURE — 96365 THER/PROPH/DIAG IV INF INIT: CPT | Mod: PN

## 2024-07-02 PROCEDURE — 63600175 PHARM REV CODE 636 W HCPCS: Mod: PN | Performed by: NURSE PRACTITIONER

## 2024-07-02 RX ORDER — HEPARIN 100 UNIT/ML
500 SYRINGE INTRAVENOUS
OUTPATIENT
Start: 2024-07-02

## 2024-07-02 RX ORDER — ZOLEDRONIC ACID 5 MG/100ML
5 INJECTION, SOLUTION INTRAVENOUS
Status: COMPLETED | OUTPATIENT
Start: 2024-07-02 | End: 2024-07-02

## 2024-07-02 RX ORDER — SODIUM CHLORIDE 0.9 % (FLUSH) 0.9 %
10 SYRINGE (ML) INJECTION
OUTPATIENT
Start: 2024-07-02

## 2024-07-02 RX ORDER — ZOLEDRONIC ACID 5 MG/100ML
5 INJECTION, SOLUTION INTRAVENOUS
OUTPATIENT
Start: 2024-07-02

## 2024-07-02 RX ORDER — SODIUM CHLORIDE 0.9 % (FLUSH) 0.9 %
10 SYRINGE (ML) INJECTION
Status: DISCONTINUED | OUTPATIENT
Start: 2024-07-02 | End: 2024-07-02 | Stop reason: HOSPADM

## 2024-07-02 RX ADMIN — SODIUM CHLORIDE: 9 INJECTION, SOLUTION INTRAVENOUS at 10:07

## 2024-07-02 RX ADMIN — ZOLEDRONIC ACID 5 MG: 0.05 INJECTION, SOLUTION INTRAVENOUS at 10:07

## 2024-07-02 NOTE — PLAN OF CARE
Pt tolerated Reclast infusion well.  Reminded pt to take her daily Calcium and Vit D supplements and to inform her dentist that she is taking Reclast prior to any invasive dental procedures.  Instructed to call MD with any problems

## 2024-07-05 ENCOUNTER — TELEPHONE (OUTPATIENT)
Dept: HEMATOLOGY/ONCOLOGY | Facility: CLINIC | Age: 79
End: 2024-07-05
Payer: MEDICARE

## 2024-07-05 NOTE — TELEPHONE ENCOUNTER
Returned pt phone call, rescheduled appts to same day of 7/2/25, for labs, NP, and reclast.  Pt agreed to date and times of appts.    ----- Message from Pattie Vernon sent at 7/5/2024  2:11 PM CDT -----  Contact: Patient  Type:  Patient Returning Call    Who Called:  self  Who Left Message for Patient:  nurse  Does the patient know what this is regarding?:  not sure  Best Call Back Number:  [phone]  Additional Information:  Please call her back to advise. Thanks!   Pulmonology

## 2024-08-12 ENCOUNTER — OFFICE VISIT (OUTPATIENT)
Dept: FAMILY MEDICINE | Facility: CLINIC | Age: 79
End: 2024-08-12
Payer: MEDICARE

## 2024-08-12 VITALS
BODY MASS INDEX: 24.55 KG/M2 | SYSTOLIC BLOOD PRESSURE: 126 MMHG | OXYGEN SATURATION: 97 % | DIASTOLIC BLOOD PRESSURE: 74 MMHG | WEIGHT: 152.75 LBS | HEART RATE: 61 BPM | HEIGHT: 66 IN

## 2024-08-12 DIAGNOSIS — E78.5 HYPERLIPIDEMIA, UNSPECIFIED HYPERLIPIDEMIA TYPE: ICD-10-CM

## 2024-08-12 DIAGNOSIS — I10 PRIMARY HYPERTENSION: Primary | ICD-10-CM

## 2024-08-12 DIAGNOSIS — M25.532 LEFT WRIST PAIN: ICD-10-CM

## 2024-08-12 PROCEDURE — 99999 PR PBB SHADOW E&M-EST. PATIENT-LVL III: CPT | Mod: PBBFAC,,, | Performed by: FAMILY MEDICINE

## 2024-08-12 PROCEDURE — 99214 OFFICE O/P EST MOD 30 MIN: CPT | Mod: S$PBB,,, | Performed by: FAMILY MEDICINE

## 2024-08-12 PROCEDURE — 99213 OFFICE O/P EST LOW 20 MIN: CPT | Mod: PBBFAC,PO | Performed by: FAMILY MEDICINE

## 2024-08-12 PROCEDURE — G2211 COMPLEX E/M VISIT ADD ON: HCPCS | Mod: S$PBB,,, | Performed by: FAMILY MEDICINE

## 2024-08-12 NOTE — PROGRESS NOTES
Subjective:       Patient ID: Sarahy Coronel is a 78 y.o. female.    Chief Complaint: Follow-up (6 month follow up)    Here for follow up multiple chronic medical issues. Doing well overall and in normal state of health.  New issue left wrist pain on cyst.  Pain radiates to thumb off/on for last year or so but maybe worse.      Follow-up  Pertinent negatives include no chest pain, chills, coughing or fever.     Review of Systems   Constitutional:  Negative for chills and fever.   Respiratory:  Negative for cough, chest tightness and shortness of breath.    Cardiovascular:  Negative for chest pain, palpitations and leg swelling.   Endocrine: Negative for cold intolerance and heat intolerance.   Psychiatric/Behavioral:  Negative for decreased concentration. The patient is not nervous/anxious.        Objective:      Physical Exam  Vitals and nursing note reviewed.   Constitutional:       Appearance: She is well-developed.   HENT:      Head: Normocephalic and atraumatic.   Cardiovascular:      Rate and Rhythm: Normal rate and regular rhythm.      Heart sounds: Normal heart sounds.   Pulmonary:      Effort: Pulmonary effort is normal.      Breath sounds: Normal breath sounds.   Psychiatric:         Mood and Affect: Mood normal.         Behavior: Behavior normal.         Assessment:       1. Primary hypertension    2. Left wrist pain    3. Hyperlipidemia, unspecified hyperlipidemia type        Plan:       Primary hypertension  -     CBC Without Differential; Future; Expected date: 08/12/2024  -     Comprehensive Metabolic Panel; Future; Expected date: 08/12/2024  -     Lipid Panel; Future; Expected date: 08/12/2024  -     TSH; Future; Expected date: 08/12/2024    Left wrist pain  -     Ambulatory referral/consult to Orthopedics; Future; Expected date: 08/19/2024    Hyperlipidemia, unspecified hyperlipidemia type      Htn stable  Lipid stable  Will monitor chronic medical issues and continue current plan of care.  Visit  today included increased complexity associated with the care of the episodic problem htn addressed and managing the longitudinal care of the patient due to the serious and/or complex managed problem(s) as above.  F/u at [facility] per pt request    Follow up in about 6 months (around 2/12/2025), or if symptoms worsen or fail to improve.

## 2024-09-18 ENCOUNTER — OFFICE VISIT (OUTPATIENT)
Dept: ORTHOPEDICS | Facility: CLINIC | Age: 79
End: 2024-09-18
Payer: MEDICARE

## 2024-09-18 ENCOUNTER — HOSPITAL ENCOUNTER (OUTPATIENT)
Dept: RADIOLOGY | Facility: HOSPITAL | Age: 79
Discharge: HOME | End: 2024-09-18
Attending: PHYSICIAN ASSISTANT
Payer: MEDICARE

## 2024-09-18 DIAGNOSIS — M25.532 LEFT WRIST PAIN: ICD-10-CM

## 2024-09-18 DIAGNOSIS — R22.32 MASS OF LEFT WRIST: Primary | ICD-10-CM

## 2024-09-18 PROCEDURE — 73110 X-RAY EXAM OF WRIST: CPT | Mod: TC,PO,LT

## 2024-09-18 PROCEDURE — 73110 X-RAY EXAM OF WRIST: CPT | Mod: 26,LT,, | Performed by: RADIOLOGY

## 2024-09-18 PROCEDURE — 99213 OFFICE O/P EST LOW 20 MIN: CPT | Mod: PBBFAC,25,PO | Performed by: PHYSICIAN ASSISTANT

## 2024-09-18 PROCEDURE — 99999 PR PBB SHADOW E&M-EST. PATIENT-LVL III: CPT | Mod: PBBFAC,,, | Performed by: PHYSICIAN ASSISTANT

## 2024-09-18 NOTE — PROGRESS NOTES
2024    HPI:  Sarahy Coronel is a 78 y.o. female, who presents to clinic today for evaluation of her left wrist mass.  States mass has been present for the past year.  States it was painful, particularly with activities or hitting the mass.  States it was stayed about the same size.  Denies any acute injuries.  Denies any paresthesias.  Denies any other complaints at this time.    PMHX:  Past Medical History:   Diagnosis Date    Actinic keratoses     Allergy     Anticoagulant long-term use     asa 83    Arthritis     Breast cancer 2017    r mastectomy    Chronic bronchitis     DVT (deep venous thrombosis)     leg, no precipitating factors, no longer on anticoagulants    Encounter for blood transfusion     ectopic pregnancy    Family history of complications due to general anesthesia     sister's daughter almost  from MALIGNANT HYPERTHERMIA ( pt denies adverse reaction)    GERD (gastroesophageal reflux disease)     no medication    Hypertension, isolated systolic 4/3/2023    Hypothyroidism     Leukopenia past Hx    Malignant hyperthermia     neice had a reaction to anesthesia- not sure if this was the same thing    Mitral regurgitation     mild,asymptomatic    Phlebitis     past Hx, legs    PONV (postoperative nausea and vomiting)     once only    Skin cancer     TMJ (dislocation of temporomandibular joint)        PSHX:  Past Surgical History:   Procedure Laterality Date    APPENDECTOMY      BREAST SURGERY      CHOLECYSTECTOMY      COLONOSCOPY      ECTOPIC PREGNANCY SURGERY      removal of ovary and tube prior to hysterectomy    ESOPHAGOGASTRODUODENOSCOPY      HYSTERECTOMY      KNEE SURGERY      meniscal repair    LIPOMA RESECTION      left shoulder    TOTAL ABDOMINAL HYSTERECTOMY W/ BILATERAL SALPINGOOPHORECTOMY  s    after ectopic pregnancy       FMHX:  Family History   Problem Relation Name Age of Onset    Breast cancer Other niece 40    Heart disease Mother      Cancer Father       Bladder Cancer Father      Cancer Maternal Uncle      Cancer Paternal Uncle      Breast cancer Maternal Aunt         SOCHX:  Social History     Tobacco Use    Smoking status: Never    Smokeless tobacco: Never   Substance Use Topics    Alcohol use: No       ALLERGIES:  Demerol (pf) [meperidine (pf)], Erythromycin, Lorabid [loracarbef], Penicillin g, and Fosamax [alendronate]    CURRENT MEDICATIONS:  Current Outpatient Medications on File Prior to Visit   Medication Sig Dispense Refill    ascorbic acid, vitamin C, (VITAMIN C) 1000 MG tablet Take 1,000 mg by mouth once daily.      calcium carbonate (OS-KAYLIN) 600 mg calcium (1,500 mg) Tab Take 600 mg by mouth once daily.      ergocalciferol, vitamin D2, (VITAMIN D ORAL) Take 5,000 Units by mouth once daily.      fish oil-omega-3 fatty acids 300-1,000 mg capsule Take 1 capsule by mouth once daily. On hold      fluticasone propionate (FLONASE) 50 mcg/actuation nasal spray USE 1 SPRAY (50 MCG TOTAL) IN EACH NOSTRIL ONCE DAILY 48 g 1    Lactobacillus rhamnosus GG (CULTURELLE) 10 billion cell capsule Take 1 capsule by mouth once daily.      levothyroxine (SYNTHROID) 88 MCG tablet Take 1 tablet (88 mcg total) by mouth before breakfast. 90 tablet 3    losartan (COZAAR) 25 MG tablet Take 1 tablet (25 mg total) by mouth once daily. 90 tablet 3    multivitamin capsule Take 1 capsule by mouth once daily.      TUMERIC-GING-OLIVE-OREG-CAPRYL ORAL Take by mouth.      cetirizine (ZYRTEC) 10 MG tablet Take 1 tablet (10 mg total) by mouth as needed for Allergies. For allergies 90 tablet 1     No current facility-administered medications on file prior to visit.       REVIEW OF SYSTEMS:  Review of Systems Complete; Negative, unless noted above.    GENERAL PHYSICAL EXAM:   There were no vitals taken for this visit.   GEN: well developed, well nourished, no acute distress   PULM: No wheezing, no respiratory distress   CV: RRR    ORTHO EXAM:   Examination of the left hand/wrist reveals a  poorly defined mass over the radial volar aspect of the distal radius just above the radial artery.  Tenderness to palpation of the mass.  The mass measures approximately 0.75 cm x 0.75 cm.  The mass is compressible.  Able to flex extend the wrist appropriately.  Sensation is grossly intact in the radial, ulnar, median nerve distributions.  Capillary refill is than 2 seconds.    RADIOLOGY:   X-rays of the left wrist were taken today in clinic.  X-rays reviewed by myself.  Imaging showed no acute fracture or dislocation.  No subluxation.  No radiopaque foreign body or mass noted.  No osseous destructive/erosive processes noted.  Presence of a calcification over the radial aspect of the distal radius measuring approximately 2 mm x 2 mm in size.  Presence of mild degenerative changes throughout the left wrist.  No other significant bony abnormalities noted.    ASSESSMENT:   Left wrist mass    PLAN:  1. I discussed with Sarahy Coronel the wrist mass pathology and treatment options in detail during today's visit.  After treatment options were discussed, we decided the best course of action this time is perform an MRI of the left wrist with without contrast to further evaluate the soft tissue mass.  She verbally agreed with the treatment plan     2.  She was scheduled for an MRI of the left wrist with and without contrast in clinic today     3. I would like her follow up in clinic after the MRI to discuss the results.  She was instructed to contact the clinic for any problems or concerns in the interim.

## 2024-10-16 ENCOUNTER — OFFICE VISIT (OUTPATIENT)
Dept: ORTHOPEDICS | Facility: CLINIC | Age: 79
End: 2024-10-16
Payer: MEDICARE

## 2024-10-16 DIAGNOSIS — R22.32 MASS OF LEFT WRIST: Primary | ICD-10-CM

## 2024-10-16 PROCEDURE — 99999 PR PBB SHADOW E&M-EST. PATIENT-LVL II: CPT | Mod: PBBFAC,,, | Performed by: ORTHOPAEDIC SURGERY

## 2024-10-16 PROCEDURE — 99212 OFFICE O/P EST SF 10 MIN: CPT | Mod: PBBFAC,PO | Performed by: ORTHOPAEDIC SURGERY

## 2024-10-16 PROCEDURE — 99214 OFFICE O/P EST MOD 30 MIN: CPT | Mod: S$PBB,,, | Performed by: ORTHOPAEDIC SURGERY

## 2024-10-16 NOTE — PROGRESS NOTES
Ms Coronel returns to clinic today.  Has a history of a mass over her volar left wrist.  She had an MRI performed.  She was here today for follow-up.  He states that it is causing her a moderate amount of pain.      Physical exam: Examination of the left wrist and hand reveals that there was no major skin changes.  She does have a mass overlying the distal wrist.  This mass measures 2 x 2 cm in size.  It appears to be fluid-filled in his moderately mobile.  It was not pulsatile.  She does have significant amount of tenderness about the CMC joint as well.  She does have positive grind test.      Radiology: MRI of the left wrist has been reviewed.  He was noted have evidence of a multiloculated ganglion cyst overlying the volar wrist.  She was degenerative changes noted without other pathology     Assessment: Left volar wrist ganglion     Plan:     1.  I have discussed treatment options.  We have discussed observation, aspiration, and excision.  At this time the patient is unclear as to what type of treatment she would like to have.  He was considering having an excision but can not commit to that at this time.      2.  If she has any worsening of symptoms or would like to consider having the cyst removed she will contact us at that time

## 2024-10-28 ENCOUNTER — TELEPHONE (OUTPATIENT)
Dept: ORTHOPEDICS | Facility: CLINIC | Age: 79
End: 2024-10-28
Payer: MEDICARE

## 2024-10-29 DIAGNOSIS — Z00.00 ENCOUNTER FOR MEDICARE ANNUAL WELLNESS EXAM: ICD-10-CM

## 2024-11-13 ENCOUNTER — OFFICE VISIT (OUTPATIENT)
Dept: ORTHOPEDICS | Facility: CLINIC | Age: 79
End: 2024-11-13
Payer: MEDICARE

## 2024-11-13 DIAGNOSIS — M67.432 GANGLION, LEFT WRIST: Primary | ICD-10-CM

## 2024-11-13 PROCEDURE — 99999 PR PBB SHADOW E&M-EST. PATIENT-LVL II: CPT | Mod: PBBFAC,,, | Performed by: ORTHOPAEDIC SURGERY

## 2024-11-13 PROCEDURE — 20612 ASPIRATE/INJ GANGLION CYST: CPT | Mod: PBBFAC,PO | Performed by: ORTHOPAEDIC SURGERY

## 2024-11-13 PROCEDURE — 99212 OFFICE O/P EST SF 10 MIN: CPT | Mod: PBBFAC,PO | Performed by: ORTHOPAEDIC SURGERY

## 2024-11-13 NOTE — PROGRESS NOTES
Ms Coronel returns to clinic today.  Has a history of a left volar wrist ganglion.  He was here today to undergo an aspiration.  He was no other complaints     Physical exam: Examination of the left wrist reveals that there is a mass over the volar wrist.  This measures 2 x 2 cm in size.  It was fluid-filled and mobile.  It was consistent with a ganglion cyst.  It was not pulsatile.    Assessment: Left volar wrist ganglion     Plan:    1.  After consent was obtained aspiration of the cyst was performed     2. A compression dressing was placed which she will leave on for 2 days     3. She will follow up with me if she has any complications from the aspiration or recurrence of the cyst

## 2024-11-13 NOTE — PROCEDURES
Ganglion Cyst    Date/Time: 11/13/2024 1:20 PM    Performed by: Juliano Gonzáles MD  Authorized by: Juliano Gonzáles MD    Consent Done?:  Yes (Verbal)  Indications:  Pain  Site marked: the procedure site was marked    Timeout: prior to procedure the correct patient, procedure, and site was verified    Prep: patient was prepped and draped in usual sterile fashion      Location:  Wrist  Wrist joint: Left volar wrist ganglion.  Needle size:  21 G  Aspirate:  Clear  Patient tolerance:  Patient tolerated the procedure well with no immediate complications    Additional Comments: A compression dressing was placed.  The patient tolerated this procedure well.

## 2024-12-31 DIAGNOSIS — E03.9 ACQUIRED HYPOTHYROIDISM: ICD-10-CM

## 2024-12-31 RX ORDER — LEVOTHYROXINE SODIUM 88 UG/1
88 TABLET ORAL
Qty: 90 TABLET | Refills: 1 | Status: SHIPPED | OUTPATIENT
Start: 2024-12-31 | End: 2025-06-29

## 2024-12-31 NOTE — TELEPHONE ENCOUNTER
Refill Decision Note   Sarahy Coronel  is requesting a refill authorization.  Brief Assessment and Rationale for Refill:  Approve     Medication Therapy Plan:        Comments:     Note composed:4:51 PM 12/31/2024

## 2024-12-31 NOTE — TELEPHONE ENCOUNTER
No care due was identified.  Health [facility] Embedded Care Due Messages. Reference number: [id number].   12/31/2024 11:44:57 AM CST

## 2024-12-31 NOTE — TELEPHONE ENCOUNTER
----- Message from Oxana sent at 12/30/2024 12:26 PM CST -----  Type:  RX Refill Request    Who Called: PT    Refill or New Rx:REFILL    RX Name and Strength:levothyroxine (SYNTHROID) 88 MCG tablet    losartan (COZAAR) 25 MG tablet    How is the patient currently taking it? (ex. 1XDay):as directed    Is this a 30 day or 90 day RX:90    Preferred Pharmacy with phone number:  [facility]/pharmacy #[id number] - [place] - [address]  [address] [place], [place] [address].  [place] [address]  Phone: [phone] Fax: [phone]        Local or Mail Order:local    Ordering Provider:Dr Nogueira    Would the patient rather a call back or a response via [facility]? Call back    Best Call Back Number:[phone]    Additional Information: No P/C at home.   Please call back to advise. Thanks!

## 2025-01-24 DIAGNOSIS — E03.9 ACQUIRED HYPOTHYROIDISM: ICD-10-CM

## 2025-01-24 RX ORDER — LOSARTAN POTASSIUM 25 MG/1
25 TABLET ORAL DAILY
Qty: 90 TABLET | Refills: 1 | Status: SHIPPED | OUTPATIENT
Start: 2025-01-24 | End: 2025-07-23

## 2025-01-24 RX ORDER — LEVOTHYROXINE SODIUM 88 UG/1
88 TABLET ORAL
Qty: 90 TABLET | Refills: 1 | Status: SHIPPED | OUTPATIENT
Start: 2025-01-24 | End: 2025-07-23

## 2025-01-24 NOTE — TELEPHONE ENCOUNTER
Refill Decision Note   Sarahy Coronel  is requesting a refill authorization.  Brief Assessment and Rationale for Refill:  Approve     Medication Therapy Plan: FLOS 2/11/25      Comments:     Note composed:2:16 PM 01/24/2025

## 2025-01-24 NOTE — TELEPHONE ENCOUNTER
No care due was identified.  Health [facility] Embedded Care Due Messages. Reference number: [id number].   1/24/2025 11:16:04 AM CST

## 2025-02-11 ENCOUNTER — LAB VISIT (OUTPATIENT)
Dept: PRIMARY CARE CLINIC | Facility: CLINIC | Age: 80
End: 2025-02-11
Payer: MEDICARE

## 2025-02-11 DIAGNOSIS — I10 PRIMARY HYPERTENSION: ICD-10-CM

## 2025-02-11 LAB
ALBUMIN SERPL BCP-MCNC: 3.9 G/DL (ref 3.5–5.2)
ALP SERPL-CCNC: 55 U/L (ref 40–150)
ALT SERPL W/O P-5'-P-CCNC: 15 U/L (ref 10–44)
ANION GAP SERPL CALC-SCNC: 10 MMOL/L (ref 8–16)
AST SERPL-CCNC: 18 U/L (ref 10–40)
BILIRUB SERPL-MCNC: 0.5 MG/DL (ref 0.1–1)
BUN SERPL-MCNC: 16 MG/DL (ref 8–23)
CALCIUM SERPL-MCNC: 9.8 MG/DL (ref 8.7–10.5)
CHLORIDE SERPL-SCNC: 107 MMOL/L (ref 95–110)
CHOLEST SERPL-MCNC: 190 MG/DL (ref 120–199)
CHOLEST/HDLC SERPL: 3.3 {RATIO} (ref 2–5)
CO2 SERPL-SCNC: 23 MMOL/L (ref 23–29)
CREAT SERPL-MCNC: 0.8 MG/DL (ref 0.5–1.4)
ERYTHROCYTE [DISTWIDTH] IN BLOOD BY AUTOMATED COUNT: 12.2 % (ref 11.5–14.5)
EST. GFR  (NO RACE VARIABLE): >60 ML/MIN/1.73 M^2
GLUCOSE SERPL-MCNC: 89 MG/DL (ref 70–110)
HCT VFR BLD AUTO: 39.2 % (ref 37–48.5)
HDLC SERPL-MCNC: 57 MG/DL (ref 40–75)
HDLC SERPL: 30 % (ref 20–50)
HGB BLD-MCNC: 12.6 G/DL (ref 12–16)
LDLC SERPL CALC-MCNC: 111.6 MG/DL (ref 63–159)
MCH RBC QN AUTO: 30.1 PG (ref 27–31)
MCHC RBC AUTO-ENTMCNC: 32.1 G/DL (ref 32–36)
MCV RBC AUTO: 94 FL (ref 82–98)
NONHDLC SERPL-MCNC: 133 MG/DL
PLATELET # BLD AUTO: 185 K/UL (ref 150–450)
PMV BLD AUTO: 11.3 FL (ref 9.2–12.9)
POTASSIUM SERPL-SCNC: 4.2 MMOL/L (ref 3.5–5.1)
PROT SERPL-MCNC: 7.2 G/DL (ref 6–8.4)
RBC # BLD AUTO: 4.19 M/UL (ref 4–5.4)
SODIUM SERPL-SCNC: 140 MMOL/L (ref 136–145)
TRIGL SERPL-MCNC: 107 MG/DL (ref 30–150)
TSH SERPL DL<=0.005 MIU/L-ACNC: 0.99 UIU/ML (ref 0.4–4)
WBC # BLD AUTO: 4.96 K/UL (ref 3.9–12.7)

## 2025-02-11 PROCEDURE — 80053 COMPREHEN METABOLIC PANEL: CPT | Performed by: FAMILY MEDICINE

## 2025-02-11 PROCEDURE — 85027 COMPLETE CBC AUTOMATED: CPT | Performed by: FAMILY MEDICINE

## 2025-02-11 PROCEDURE — 84443 ASSAY THYROID STIM HORMONE: CPT | Performed by: FAMILY MEDICINE

## 2025-02-11 PROCEDURE — 80061 LIPID PANEL: CPT | Performed by: FAMILY MEDICINE

## 2025-02-17 ENCOUNTER — OFFICE VISIT (OUTPATIENT)
Dept: PRIMARY CARE CLINIC | Facility: CLINIC | Age: 80
End: 2025-02-17
Payer: MEDICARE

## 2025-02-17 VITALS
SYSTOLIC BLOOD PRESSURE: 139 MMHG | DIASTOLIC BLOOD PRESSURE: 80 MMHG | WEIGHT: 149.5 LBS | HEIGHT: 66 IN | BODY MASS INDEX: 24.03 KG/M2 | HEART RATE: 62 BPM | RESPIRATION RATE: 16 BRPM | OXYGEN SATURATION: 99 %

## 2025-02-17 DIAGNOSIS — Z80.52 FAMILY HISTORY OF BLADDER CANCER: ICD-10-CM

## 2025-02-17 DIAGNOSIS — R79.9 ABNORMAL FINDING OF BLOOD CHEMISTRY, UNSPECIFIED: ICD-10-CM

## 2025-02-17 DIAGNOSIS — R31.9 HEMATURIA, UNSPECIFIED TYPE: ICD-10-CM

## 2025-02-17 DIAGNOSIS — R35.1 FREQUENT URINATION AT NIGHT: ICD-10-CM

## 2025-02-17 DIAGNOSIS — R35.0 URINARY FREQUENCY: Primary | ICD-10-CM

## 2025-02-17 LAB
ESTIMATED AVG GLUCOSE: 111 MG/DL (ref 68–131)
HBA1C MFR BLD: 5.5 % (ref 4–5.6)

## 2025-02-17 PROCEDURE — 83036 HEMOGLOBIN GLYCOSYLATED A1C: CPT | Performed by: NURSE PRACTITIONER

## 2025-02-17 NOTE — PROGRESS NOTES
Patient ID: Sarahy Coronel is a 79 y.o. female.    Chief Complaint: Follow-up (States that she has blood in urine )  Last seen in primary care by PCP on 08/12/2024  History of Present Illness    CHIEF COMPLAINT:  Patient presents today with lower back and abdominal pain    HISTORY OF PRESENT ILLNESS:  She reports lower back and abdominal pain, predominantly located below the flank region on the right side, described as aching in nature. She experienced blood when wiping after urination one week ago, noting pink-colored blood rather than dark red. She has nocturnal frequency, urinating approximately 4 times per night with sensation of full bladder but only passing small amounts of urine.    MEDICAL HISTORY:  She has a history of kidney problems in her 30s requiring cystoscopy. She has a history of breast cancer status post mastectomy and a complete hysterectomy.    FAMILY HISTORY:  Her father had bladder cancer.    MEDICATIONS:  She takes Synthroid (generic) between 5-6 AM and Losartan.    ALLERGIES:  She has allergies to Rifampin and Penicillin.         Physical Exam    Vitals: Normal heart rate.  General: No acute distress. Well-developed. Well-nourished.  Eyes: EOMI. Sclerae anicteric.  HENT: Normocephalic. Atraumatic. Nares patent. Moist oral mucosa.  Ears: Bilateral TMs clear. Bilateral EACs clear.  Cardiovascular: Regular rate. Regular rhythm. No murmurs. No rubs. No gallops. Normal S1, S2.  Respiratory: Normal respiratory effort. Clear to auscultation bilaterally. No rales. No rhonchi. No wheezing.  Abdomen: Soft. Non-tender. Non-distended. Normoactive bowel sounds. No costovertebral discomfort  Musculoskeletal: No  obvious deformity. Normal hand strength bilaterally. Good leg strength.  Extremities: No lower extremity edema.  Neurological: Alert & oriented x3. No slurred speech. Normal gait.  Psychiatric: Normal mood. Normal affect. Good insight. Good judgment.  Skin: Warm. Dry. No rash.  Back: No  costovertebral angle tenderness.         Assessment & Plan    IMPRESSION:  - Evaluated lower abdominal and back pain with hematuria  - Urinalysis negative for blood, nitrites; sent for culture due to patient's history  - Ordered renal ultrasound to further investigate cause of symptoms  - Considered postmenopausal bleeding, but unlikely due to patient's history of total hysterectomy  - Assessed sleep issues; considering valerian root before prescription sleep aids due to fall risk at patient's age  - Checking A1C due to reported nocturnal frequency  - Will check ultrasound results on Friday    LOWER BACK AND ABDOMINAL PAIN:  - Evaluated the patient's lower back and lower abdominal pain, described as aching and non-radiating.  - Performed physical exam, revealing no tenderness on palpation, with pain more pronounced on the right side, below the flank region.  - Noted absence of costal fatigue or angle tenderness.  - Noted patient's report of lower abdominal pain.    URINARY SYMPTOMS:  - Ordered urinalysis with reflex to culture.  - Scheduled renal ultrasound at [facility].  - Instructed patient to follow up in 3 weeks for urinary symptoms and test results.  - Noted patient's report of light pink blood when wiping after urination one week ago.  - Reviewed urinalysis results, which were negative for blood.  - Ordered ultrasound of kidneys due to negative urinalysis but reported hematuria.  - Sent urine sample for culture.  - Noted patient's report of urinating 3-4 times per night.  - Advised patient to stop fluid intake around 6 PM to reduce nighttime urination frequency.    FAMILY HISTORY OF BLADDER CANCER:  - Documented patient's family history of bladder cancer.    BREAST CANCER HISTORY:  - Noted patient's history of breast cancer and mastectomy.    DIABETES SCREENING:  - Ordered hemoglobin A1C test to check for diabetes.    GYNECOLOGICAL HISTORY:  - Documented patient's history of complete  hysterectomy.  - Noted no need for PAP smear due to complete hysterectomy.    GANGLION CYST:  - Noted recurrence of ganglion cyst in left wrist after previous drainage.  - Advised against attempting to remove the cyst at home.    DIVERTICULOSIS:  - Documented patient's history of diverticulosis in left colon.  - Recommend follow-up colonoscopy.  - Provided options for colonoscopy providers.  - Discussed importance of regular colonoscopies given patient's history of polyps.  - Instructed patient to follow up on scheduling colonoscopy.  - Noted patient's history of numerous small polyps in colon.  - Recommend follow-up colonoscopy after 10 years.  - Emphasized importance of getting colonoscopy done.    THYROID DISORDER:  - Noted patient takes Synthroid (generic) for hypothyroidism management.    HYPERTENSION:  - Noted need to review blood pressure medications.  - Documented patient takes Losartan for blood pressure management.    SLEEP ISSUES:  - Discussed importance of adequate sleep for overall health, recommending 6 hours minimum with naps if necessary.  - Advised patient to consider napping during the day if not getting 6 hours of sleep at night.  - Explained natural sleep aids like valerian root as initial option before prescription medications.  - Recommend trying valerian root for sleep issues.  - Noted patient's report of difficulty staying asleep, waking up multiple times during the night.  - Considered sleep medication if natural remedies don't help.    ALLERGIES:  - Documented patient's reported allergy to penicillin.    FOLLOW UP:  - Patient to take time to rest and prioritize self-care.  - Hemoglobin A1C test ordered.          BP Readings from Last 3 Encounters:   02/17/25 139/80   08/12/24 126/74   07/02/24 (!) 153/67       Will re evaluate in 3 weeks and consider ultrasound of kidney with her high family risk and personal history of breast    Medication List with Changes/Refills   Current Medications     ASCORBIC ACID, VITAMIN C, (VITAMIN C) 1000 MG TABLET    Take 1,000 mg by mouth once daily.    CALCIUM CARBONATE (OS-KAYLIN) 600 MG CALCIUM (1,500 MG) TAB    Take 600 mg by mouth once daily.    CETIRIZINE (ZYRTEC) 10 MG TABLET    Take 1 tablet (10 mg total) by mouth as needed for Allergies. For allergies    ERGOCALCIFEROL, VITAMIN D2, (VITAMIN D ORAL)    Take 5,000 Units by mouth once daily.    FISH OIL-OMEGA-3 FATTY ACIDS 300-1,000 MG CAPSULE    Take 1 capsule by mouth once daily. On hold    FLUTICASONE PROPIONATE (FLONASE) 50 MCG/ACTUATION NASAL SPRAY    USE 1 SPRAY (50 MCG TOTAL) IN EACH NOSTRIL ONCE DAILY    LACTOBACILLUS RHAMNOSUS GG (CULTURELLE) 10 BILLION CELL CAPSULE    Take 1 capsule by mouth once daily.    LEVOTHYROXINE (SYNTHROID) 88 MCG TABLET    Take 1 tablet (88 mcg total) by mouth before breakfast.    LOSARTAN (COZAAR) 25 MG TABLET    Take 1 tablet (25 mg total) by mouth once daily.    MULTIVITAMIN CAPSULE    Take 1 capsule by mouth once daily.    TUMERIC-GING-OLIVE-OREG-CAPRYL ORAL    Take by mouth.        Follow up in about 3 weeks (around 3/10/2025).    This note was generated with the assistance of ambient listening technology. Verbal consent was obtained by the patient and accompanying visitor(s) for the recording of patient appointment to facilitate this note. I attest to having reviewed and edited the generated note for accuracy, though some syntax or spelling errors may persist. Please contact the author of this note for any clarification.

## 2025-02-17 NOTE — PATIENT INSTRUCTIONS
Chamomile Tea  with or without Lemon Balm at night for rest  Can also consider Valerian Root tea or tablet for sleep

## 2025-02-19 ENCOUNTER — RESULTS FOLLOW-UP (OUTPATIENT)
Dept: FAMILY MEDICINE | Facility: CLINIC | Age: 80
End: 2025-02-19
Payer: MEDICARE

## 2025-02-19 ENCOUNTER — TELEPHONE (OUTPATIENT)
Dept: FAMILY MEDICINE | Facility: CLINIC | Age: 80
End: 2025-02-19
Payer: MEDICARE

## 2025-02-19 NOTE — TELEPHONE ENCOUNTER
----- Message from Kassandra Jenkins DNP, YOSI sent at 2/19/2025  8:29 AM CST -----  Unable to reach via telephone. Message left. Please continue reaching out to inform urine was normal and no blood or signs of infection noted. Her HGBA1c was normal at 5.5 so NO diabetes. Please   remind her to get her ultrasound scheduled.   ----- Message -----  From: [place], [employer]  Sent: 2/17/2025  10:57 PM CST  To: Kassandra Jenkins DNP, APRN

## 2025-02-21 DIAGNOSIS — Z00.00 ENCOUNTER FOR MEDICARE ANNUAL WELLNESS EXAM: ICD-10-CM

## 2025-03-17 ENCOUNTER — OFFICE VISIT (OUTPATIENT)
Dept: PRIMARY CARE CLINIC | Facility: CLINIC | Age: 80
End: 2025-03-17
Payer: MEDICARE

## 2025-03-17 VITALS
HEART RATE: 62 BPM | TEMPERATURE: 98 F | OXYGEN SATURATION: 96 % | DIASTOLIC BLOOD PRESSURE: 82 MMHG | BODY MASS INDEX: 23.92 KG/M2 | WEIGHT: 148.81 LBS | HEIGHT: 66 IN | SYSTOLIC BLOOD PRESSURE: 162 MMHG

## 2025-03-17 DIAGNOSIS — R35.0 URINARY FREQUENCY: Primary | ICD-10-CM

## 2025-03-17 DIAGNOSIS — R31.9 HEMATURIA, UNSPECIFIED TYPE: ICD-10-CM

## 2025-03-17 DIAGNOSIS — Z80.52 FAMILY HISTORY OF BLADDER CANCER: ICD-10-CM

## 2025-03-17 PROCEDURE — 99214 OFFICE O/P EST MOD 30 MIN: CPT | Mod: S$GLB,,, | Performed by: NURSE PRACTITIONER

## 2025-03-17 RX ORDER — LEVOCETIRIZINE DIHYDROCHLORIDE 5 MG/1
5 TABLET, FILM COATED ORAL
COMMUNITY
Start: 2024-11-23

## 2025-03-17 NOTE — PROGRESS NOTES
Patient ID: Sarahy oCronel is a 79 y.o. female.    Chief Complaint: No chief complaint on file.  Last seen in primary care by me on 02/17/2025.  History of Present Illness    CHIEF COMPLAINT:  Patient presents today for follow up of urinary symptoms and pain    URINARY SYMPTOMS:  She reports nocturia 3-4 times per night with dark yellow urine. She had one episode of hematuria described as dark pinkish in color. She has a history of recurrent bladder infections many years ago but denies any recent infections. Her daily fluid intake consists of three 8oz bottles of water, a cup of green tea with lunch, and tea before bed around 8:00-8:30 PM.    PAIN:  She reports significant decrease in pain intensity compared to previous symptoms, now describing intermittent ache rather than constant pain that comes and goes throughout the day.    SLEEP:  She gets 4-5 hours of sleep per night, which represents a slight improvement from previous sleep duration of 3 hours. She uses chamomile tea to aid with sleep.    MEDICAL HISTORY:  She has diverticulitis.    SURGICAL HISTORY:  She has undergone hysterectomy and cystoscopy several years ago.    FAMILY HISTORY:  She has family history of bladder cancer with liver metastasis.      ROS:  General: -fever, -chills, -fatigue, -weight gain, -weight loss  Eyes: -vision changes, -redness, -discharge  ENT: -ear pain, -nasal congestion, -sore throat  Cardiovascular: -chest pain, -palpitations, -lower extremity edema  Respiratory: -cough, -shortness of breath  Gastrointestinal: -abdominal pain, -nausea, -vomiting, -diarrhea, -constipation, -blood in stool  Genitourinary: -dysuria, -hematuria, -frequency, +nocturia, +abnormal urine appearance, +urine changes  Musculoskeletal: -joint pain, -muscle pain  Skin: -rash, -lesion  Neurological: -headache, -dizziness, -numbness, -tingling, +difficulty staying asleep, +difficulty falling asleep  Psychiatric: -anxiety, -depression, -sleep difficulty          Physical Exam    Vitals: Blood pressure: 162/82.  General: No acute distress. Well-developed. Well-nourished.  Eyes: EOMI. Sclerae anicteric.  HENT: Normocephalic. Atraumatic. Nares patent. Moist oral mucosa.  Ears: Bilateral TMs clear. Bilateral EACs clear.  Cardiovascular: Regular rate. Regular rhythm. No murmurs. No rubs. No gallops. Normal S1, S2.  Respiratory: Normal respiratory effort. Clear to auscultation bilaterally. No rales. No rhonchi. No wheezing.  Abdomen: Soft. Non-tender. Non-distended. Normoactive bowel sounds.  Musculoskeletal: No  obvious deformity.  Extremities: No lower extremity edema.  Neurological: Alert & oriented x3. No slurred speech. Normal gait.  Psychiatric: Normal mood. Normal affect. Good insight. Good judgment.  Skin: Warm. Dry. No rash.         Assessment & Plan    IMPRESSION:  - Considered MRI Bladder due to family history of bladder cancer and previous episode of blood in urine, despite normal US results.  - Evaluated sleep issues and considered medication options if non-pharmacological interventions are ineffective.  - Assessed blood pressure, noting slight elevation likely due to patient's anxiety during visit.    ESSENTIAL HYPERTENSION:  - Measured the patient's blood pressure at 162/82, which is elevated, possibly due to patient anxiety.  - Noted that the patient reports checking blood pressure at home, with a recent reading of 140+.  - Acknowledged that the patient's blood pressure is usually not high and suggested checking it at home when relaxed.  - Instructed the patient to monitor blood pressure at home when relaxed, 30-45 minutes after eating, and record readings with times.  - Advised the patient to bring recorded blood pressure readings to the next appointment.  - Confirmed that the patient is taking blood pressure medication, likely a 160mg dose.    DIVERTICULITIS:  - Confirmed that the patient has diverticulitis.  - Noted that the patient plans to have a  "colonoscopy this year as recommended by Dr. Rutledge 10 years ago.    NOCTURIA:  - Noted that the patient reports urinating 3-4 times at night.  - Discussed potential causes of nocturia, ruling out diabetes and kidney issues.  - Recommend increasing water intake to 5-6 bottles a day, while maintaining the 8:00-8:30 PM cutoff for fluids.  - Instructed the patient to continue to limit fluid intake after 8:00-8:30 PM.    HEMATURIA:  - Noted that patient reports an instance of blood in urine noted at home with wiping after urination, described as darker pinkish color.   - Reviewed recent urine test results, which showed no blood.  - Expressed concern about the history of blood in urine, especially considering family history of bladder cancer. She also states 2 sisters with "bladder issues", but not cancer  - Emphasized the importance of increased water intake for urinary health.  - Ordered CT urogram and pelvis w/wo to evaluate bladder.    INSOMNIA:  - Noted that the patient reports getting 4-5 hours of sleep at night, an improvement from previous 3 hours.  - Acknowledged sleep issues and discussed potential treatments.  - Noted that the patient is currently drinking chamomile tea before bed to help with sleep.  - Recommend trying OTC Valerian root tablets for sleep improvement.  - Advised to contact the office for a prescription of Vistaril if Valerian root is ineffective or unavailable.    HISTORY OF URINARY TRACT INFECTIONS:  - Noted that the patient reports a history of severe bladder infections, but has not had one in years.    HYSTERECTOMY:  - Noted that the patient has a history of hysterectomy.    FAMILY HISTORY OF BLADDER CANCER:  - Noted that the patient has a family history of bladder cancer, with her father having had bladder cancer that spread to the liver.  - Recommend a CT urogram/bladder w/wo for further investigation due to family history.    HYDRATION:  - Explained importance of increased water intake " for hydration.  - Discussed potential dehydrating effects of green tea.  - Patient to increase water intake to 5-6 bottles (8 oz) per day, in addition to current 3 bottles.    FOLLOW-UP:  - Scheduled follow up in 1 month to review results.          Medication List with Changes/Refills   Current Medications    ASCORBIC ACID, VITAMIN C, (VITAMIN C) 1000 MG TABLET    Take 1,000 mg by mouth once daily.    CALCIUM CARBONATE (OS-KAYLIN) 600 MG CALCIUM (1,500 MG) TAB    Take 600 mg by mouth once daily.    CETIRIZINE (ZYRTEC) 10 MG TABLET    Take 1 tablet (10 mg total) by mouth as needed for Allergies. For allergies    ERGOCALCIFEROL, VITAMIN D2, (VITAMIN D ORAL)    Take 5,000 Units by mouth once daily.    FISH OIL-OMEGA-3 FATTY ACIDS 300-1,000 MG CAPSULE    Take 1 capsule by mouth once daily. On hold    FLUTICASONE PROPIONATE (FLONASE) 50 MCG/ACTUATION NASAL SPRAY    USE 1 SPRAY (50 MCG TOTAL) IN EACH NOSTRIL ONCE DAILY    LACTOBACILLUS RHAMNOSUS GG (CULTURELLE) 10 BILLION CELL CAPSULE    Take 1 capsule by mouth once daily.    LEVOCETIRIZINE (XYZAL) 5 MG TABLET    Take 5 mg by mouth.    LEVOTHYROXINE (SYNTHROID) 88 MCG TABLET    Take 1 tablet (88 mcg total) by mouth before breakfast.    LOSARTAN (COZAAR) 25 MG TABLET    Take 1 tablet (25 mg total) by mouth once daily.    MULTIVITAMIN CAPSULE    Take 1 capsule by mouth once daily.    TUMERIC-GING-OLIVE-OREG-CAPRYL ORAL    Take by mouth.        No follow-ups on file.    This note was generated with the assistance of ambient listening technology. Verbal consent was obtained by the patient and accompanying visitor(s) for the recording of patient appointment to facilitate this note. I attest to having reviewed and edited the generated note for accuracy, though some syntax or spelling errors may persist. Please contact the author of this note for any clarification.

## 2025-04-25 ENCOUNTER — OFFICE VISIT (OUTPATIENT)
Dept: PRIMARY CARE CLINIC | Facility: CLINIC | Age: 80
End: 2025-04-25
Payer: MEDICARE

## 2025-04-25 VITALS
OXYGEN SATURATION: 99 % | RESPIRATION RATE: 16 BRPM | SYSTOLIC BLOOD PRESSURE: 132 MMHG | BODY MASS INDEX: 23.53 KG/M2 | HEIGHT: 66 IN | DIASTOLIC BLOOD PRESSURE: 80 MMHG | WEIGHT: 146.38 LBS | HEART RATE: 60 BPM

## 2025-04-25 DIAGNOSIS — R31.21 ASYMPTOMATIC MICROSCOPIC HEMATURIA: ICD-10-CM

## 2025-04-25 DIAGNOSIS — K76.89 HEPATIC CYST: Primary | ICD-10-CM

## 2025-04-25 DIAGNOSIS — N39.45 CONTINUOUS LEAKAGE OF URINE: ICD-10-CM

## 2025-04-25 DIAGNOSIS — K57.90 DIVERTICULOSIS: ICD-10-CM

## 2025-04-25 DIAGNOSIS — E03.9 ACQUIRED HYPOTHYROIDISM: ICD-10-CM

## 2025-04-25 DIAGNOSIS — I10 PRIMARY HYPERTENSION: ICD-10-CM

## 2025-04-25 PROCEDURE — 99214 OFFICE O/P EST MOD 30 MIN: CPT | Mod: S$GLB,,, | Performed by: NURSE PRACTITIONER

## 2025-04-25 RX ORDER — LEVOTHYROXINE SODIUM 88 UG/1
88 TABLET ORAL
Qty: 90 TABLET | Refills: 1 | Status: SHIPPED | OUTPATIENT
Start: 2025-04-25 | End: 2025-10-22

## 2025-04-25 RX ORDER — LOSARTAN POTASSIUM 25 MG/1
25 TABLET ORAL DAILY
Qty: 90 TABLET | Refills: 1 | Status: SHIPPED | OUTPATIENT
Start: 2025-04-25 | End: 2025-10-22

## 2025-04-25 NOTE — PROGRESS NOTES
Patient ID: Sarahy Coronel is a 79 y.o. female.    Chief Complaint: Follow-up CT abdomen  Last seen in primary care by me on 02/17/2025  History of Present Illness    CHIEF COMPLAINT:  Patient presents today for follow-up CT    GENITOURINARY:  She reports history of urinary tract infections many years ago. She currently experiences small amounts of bladder leakage and notes the one episode of blood with wiping of unknown origin. Family history is significant for two sisters with bladder issues.    IMAGING:  CT at [facility] on March 28, 2025 showed diverticulosis, multiple hepatic cysts (3.8 mm cyst in left hepatic lobe, additional 12 and 13 mm cysts), multiple splenic hypodensities consistent with cysts, post-cholecystectomy changes, and a decompressed urinary bladder.    GASTROINTESTINAL:  She is due for colonoscopy, as it has been 10 years since her last one. She has not scheduled the appointment due to recent multiple medical tests and personal circumstances.    CURRENT MEDICATIONS:  She takes levothyroxine and losartan for blood pressure management.      ROS:  General: -fever, -chills, -fatigue, -weight gain, -weight loss  Eyes: -vision changes, -redness, -discharge  ENT: -ear pain, -nasal congestion, -sore throat  Cardiovascular: -chest pain, -palpitations, -lower extremity edema  Respiratory: -cough, -shortness of breath  Gastrointestinal: -abdominal pain, -nausea, -vomiting, -diarrhea, -constipation, -blood in stool  Genitourinary: -dysuria, -hematuria, -frequency, +urinary incontinence, +urgency, +abnormal bleeding  Musculoskeletal: -joint pain, -muscle pain, +body aches  Skin: -rash, +lesion  Neurological: -headache, -dizziness, -numbness, -tingling  Psychiatric: -anxiety, -depression, -sleep difficulty         Physical Exam    Vitals: Blood pressure: 132/80.  General: No acute distress. Well-developed. Well-nourished.  Eyes: EOMI. Sclerae anicteric.  HENT: Normocephalic. Atraumatic. Nares patent. Moist oral  mucosa.  Ears: Bilateral TMs clear. Bilateral EACs clear.  Cardiovascular: Regular rate. Regular rhythm. No murmurs. No rubs. No gallops. Normal S1, S2.  Respiratory: Normal respiratory effort. Clear to auscultation bilaterally. No rales. No rhonchi. No wheezing.  Abdomen: Soft. Non-tender. Non-distended. Normoactive bowel sounds.  Musculoskeletal: No  obvious deformity.  Extremities: No lower extremity edema.  Neurological: Alert & oriented x3. No slurred speech. Normal gait.  Psychiatric: Normal mood. Normal affect. Good insight. Good judgment.  Skin: Warm. Dry. No rash.         Assessment & Plan    I10 Essential (primary) hypertension  E03.9 Hypothyroidism, unspecified  N39.3 Stress incontinence (female) (male)  R31.9 Hematuria, unspecified  K57.90 Diverticulosis of intestine, part unspecified, without perforation or abscess without bleeding  D73.4 Cyst of spleen  Z87.440 Personal history of urinary (tract) infections    IMPRESSION:  - Reviewed CT Abdomen from March 28, 2025, showing diverticulosis, no kidney issues, and a decompressed urinary bladder.  - Noted small cysts in liver and spleen, not of immediate concern.  - Considered history of UTIs and family history of bladder issues.  - BP readings generally within acceptable range.    ESSENTIAL HYPERTENSION:  - Monitor the patient's blood pressure readings, which range from 120s to 170s systolic and 60s to 80s diastolic.  - Noted current blood pressure reading of 132/80, which is considered good.  - Assessed that the one-time high reading of 171/77 is not of concern.  - Continue losartan (Cozaar) for blood pressure management.  - Send a refill prescription to [facility] in [place].    HYPOTHYROIDISM:  - Continue levothyroxine (Synthroid) for hypothyroidism management.  - Ensure an adequate supply of medication.    STRESS INCONTINENCE AND BLADDER DISORDERS:  - Monitor the patient's report of small amount of bladder leakage, not requiring the use of pads.  - Assess  the bladder leakage in the context of the patient's family history of bladder issues.  - Refer the patient to a urologist for evaluation of bladder leakage and other urinary symptoms.  - Monitor CT findings of a decompressed urinary bladder, which precluded full assessment.  - Assess that the decompressed bladder may require further follow-up and assessment by a urologist.  - Refer the patient to a urologist for evaluation of bladder issues and CT findings.    HEMATURIA:  - Monitor the patient's previous report of blood when wiping, noting that the issue has not persisted.  - Refer the patient to a urologist for follow-up on the previous hematuria and family history concerns.    RENAL SYSTEM EVALUATION:  - Evaluate CT results showing no kidney stones, no small kidneys, and no defects or masses in the renal system.    DIVERTICULOSIS:  - Monitor CT confirmation of diverticulosis, which the patient was already aware of.  - Assess that the diverticulosis is not of significant concern as it's a known condition for the patient.  - Advise the patient to continue eating nuts to manage bowel movements, emphasizing the importance of chewing them well.    LIVER CYSTS:  - Monitor CT findings of a 3.8 mm cyst in the left hepatic lobe of the liver, with additional 12 and 13 mm cysts.  - Explain to the patient that the cyst is fluid-filled and can be detected on imaging.    SPLENIC CYSTS:  - Monitor CT findings showing a few hypodensities in the spleen, likely representing small cysts.    HISTORY OF URINARY TRACT INFECTIONS:  - Note the patient's reported history of frequent urinary tract infections in the past.    GALLBLADDER REMOVAL:  - Note CT evidence of previous gallbladder removal.    FAMILY HISTORY OF BLADDER ISSUES:  - Document the patient's reported family history of bladder issues, with two sisters having bladder problems.  - Acknowledge the family history as a reason for follow-up with a urologist.    FOLLOW-UP:  -  Follow up in 4-6 months.        Discussed results of CT abdomen/Pelvis dated  03/28/2025  BP Readings from Last 3 Encounters:   04/25/25 132/80   03/17/25 (!) 162/82   02/17/25 139/80            Discussed results of CT abdomen and Pelvis doen at [facility] dated  03/28/2025  Medication List with Changes/Refills   Current Medications    ASCORBIC ACID, VITAMIN C, (VITAMIN C) 1000 MG TABLET    Take 1,000 mg by mouth once daily.    CALCIUM CARBONATE (OS-KAYLIN) 600 MG CALCIUM (1,500 MG) TAB    Take 600 mg by mouth once daily.    CETIRIZINE (ZYRTEC) 10 MG TABLET    Take 1 tablet (10 mg total) by mouth as needed for Allergies. For allergies    ERGOCALCIFEROL, VITAMIN D2, (VITAMIN D ORAL)    Take 5,000 Units by mouth once daily.    FISH OIL-OMEGA-3 FATTY ACIDS 300-1,000 MG CAPSULE    Take 1 capsule by mouth once daily. On hold    FLUTICASONE PROPIONATE (FLONASE) 50 MCG/ACTUATION NASAL SPRAY    USE 1 SPRAY (50 MCG TOTAL) IN EACH NOSTRIL ONCE DAILY    LACTOBACILLUS RHAMNOSUS GG (CULTURELLE) 10 BILLION CELL CAPSULE    Take 1 capsule by mouth once daily.    LEVOCETIRIZINE (XYZAL) 5 MG TABLET    Take 5 mg by mouth.    MULTIVITAMIN CAPSULE    Take 1 capsule by mouth once daily.    TUMERIC-GING-OLIVE-OREG-CAPRYL ORAL    Take by mouth.   Changed and/or Refilled Medications    Modified Medication Previous Medication    LEVOTHYROXINE (SYNTHROID) 88 MCG TABLET levothyroxine (SYNTHROID) 88 MCG tablet       Take 1 tablet (88 mcg total) by mouth before breakfast.    Take 1 tablet (88 mcg total) by mouth before breakfast.    LOSARTAN (COZAAR) 25 MG TABLET losartan (COZAAR) 25 MG tablet       Take 1 tablet (25 mg total) by mouth once daily.    Take 1 tablet (25 mg total) by mouth once daily.        Follow up in about 4 months (around 8/25/2025) for Primary Care Provider.    This note was generated with the assistance of ambient listening technology. Verbal consent was obtained by the patient and accompanying visitor(s) for the recording of  patient appointment to facilitate this note. I attest to having reviewed and edited the generated note for accuracy, though some syntax or spelling errors may persist. Please contact the author of this note for any clarification.

## 2025-06-21 ENCOUNTER — HOSPITAL ENCOUNTER (OUTPATIENT)
Dept: RADIOLOGY | Facility: HOSPITAL | Age: 80
Discharge: HOME | End: 2025-06-21
Attending: NURSE PRACTITIONER
Payer: MEDICARE

## 2025-06-21 VITALS — WEIGHT: 146 LBS | BODY MASS INDEX: 23.46 KG/M2 | HEIGHT: 66 IN

## 2025-06-21 DIAGNOSIS — Z12.31 ENCOUNTER FOR SCREENING MAMMOGRAM FOR MALIGNANT NEOPLASM OF BREAST: ICD-10-CM

## 2025-06-21 DIAGNOSIS — Z90.11 S/P RIGHT MASTECTOMY: ICD-10-CM

## 2025-06-21 DIAGNOSIS — Z85.3 HISTORY OF RIGHT BREAST CANCER: ICD-10-CM

## 2025-06-21 PROCEDURE — 77067 SCR MAMMO BI INCL CAD: CPT | Mod: TC,52,PO

## 2025-06-21 PROCEDURE — 77063 BREAST TOMOSYNTHESIS BI: CPT | Mod: 26,52,, | Performed by: RADIOLOGY

## 2025-06-21 PROCEDURE — 77067 SCR MAMMO BI INCL CAD: CPT | Mod: 26,52,, | Performed by: RADIOLOGY

## 2025-06-22 ENCOUNTER — PATIENT MESSAGE (OUTPATIENT)
Dept: HEMATOLOGY/ONCOLOGY | Facility: CLINIC | Age: 80
End: 2025-06-22
Payer: MEDICARE

## 2025-06-30 ENCOUNTER — OFFICE VISIT (OUTPATIENT)
Dept: UROLOGY | Facility: CLINIC | Age: 80
End: 2025-06-30
Payer: MEDICARE

## 2025-06-30 VITALS — BODY MASS INDEX: 23.74 KG/M2 | HEIGHT: 66 IN | WEIGHT: 147.69 LBS

## 2025-06-30 DIAGNOSIS — N39.45 CONTINUOUS LEAKAGE OF URINE: ICD-10-CM

## 2025-06-30 DIAGNOSIS — R31.21 ASYMPTOMATIC MICROSCOPIC HEMATURIA: ICD-10-CM

## 2025-06-30 PROCEDURE — 99204 OFFICE O/P NEW MOD 45 MIN: CPT | Mod: S$PBB,,, | Performed by: UROLOGY

## 2025-06-30 PROCEDURE — 99999 PR PBB SHADOW E&M-EST. PATIENT-LVL III: CPT | Mod: PBBFAC,,, | Performed by: UROLOGY

## 2025-06-30 PROCEDURE — 99213 OFFICE O/P EST LOW 20 MIN: CPT | Mod: PBBFAC,PO | Performed by: UROLOGY

## 2025-06-30 PROCEDURE — G2211 COMPLEX E/M VISIT ADD ON: HCPCS | Mod: ,,, | Performed by: UROLOGY

## 2025-07-01 NOTE — PROGRESS NOTES
Subjective:      Name: Sarahy Coronel  : 1945  MRN: [id number]    CC:  86 month post breast surveillance/Reclast    HPI:   Sarahy Coronel is a 79 y.o. female presents for annual breast evaluation & next Reclast (#1 given 24).    The patient is a 79-year-old white female known to Dr. Ricardo for Stage I (T1b, N0, M0) papillary right breast carcinoma.    Tumor was found to be ER/MO positive & Vky1lgk negative.    She is status post right mastectomy (17) for residual disease per Dr. Starr.    Oncotype DX did not have enough tissue for analysis.    She was started on Arimidex on 2018 - discontinued on    She received her 1st Prolia injection on 2018.    The patient also carries the diagnosis of Osteoporosis that was diagnosed in ; last Prolia 23 - #1 Reclast     Today:  25  She returns to clinic today for her 86 month post breast evaluation, review of imaging & next Reclast.  Continues to work hard in her yard & garden; donaldo & taking care of her sister & aunt.  Compliant with Calcium & 2,000 I/u of Vitamin D daily.  She denies any difficulties with chest pain, nipple discharge, unbearable hot flashes, fevers, chills, abdominal discomfort, nausea, vomiting, diarrhea, bleeding, invasive dental work, etc.    No new complaints or pertinent findings on a 14-point review of systems.    Past Medical History:   Diagnosis Date    Actinic keratoses     Allergy     Anticoagulant long-term use     asa 83    Arthritis     Breast cancer 2017    r mastectomy    Chronic bronchitis     DVT (deep venous thrombosis)     leg, no precipitating factors, no longer on anticoagulants    Encounter for blood transfusion     ectopic pregnancy    Family history of complications due to general anesthesia     sister's daughter almost  from MALIGNANT HYPERTHERMIA ( pt denies adverse reaction)    GERD (gastroesophageal reflux disease)     no medication    Hypertension,  isolated systolic 4/3/2023    Hypothyroidism     Leukopenia past Hx    Malignant hyperthermia     neice had a reaction to anesthesia- not sure if this was the same thing    Mitral regurgitation     mild,asymptomatic    Phlebitis     past Hx, legs    PONV (postoperative nausea and vomiting)     once only    Skin cancer     TMJ (dislocation of temporomandibular joint)        Past Surgical History:   Procedure Laterality Date    APPENDECTOMY      BREAST SURGERY      CHOLECYSTECTOMY      COLONOSCOPY      ECTOPIC PREGNANCY SURGERY      removal of ovary and tube prior to hysterectomy    ESOPHAGOGASTRODUODENOSCOPY      HYSTERECTOMY      KNEE SURGERY  08/2014    meniscal repair    LIPOMA RESECTION      left shoulder    MASTECTOMY Right 2017    TOTAL ABDOMINAL HYSTERECTOMY W/ BILATERAL SALPINGOOPHORECTOMY  1970's    after ectopic pregnancy       Family History   Problem Relation Name Age of Onset    Heart disease Mother      Cancer Father      Bladder Cancer Father      No Known Problems Sister      No Known Problems Daughter      Breast cancer Maternal Aunt      Cancer Maternal Uncle      No Known Problems Paternal Aunt      Cancer Paternal Uncle      No Known Problems Maternal Grandmother      No Known Problems Maternal Grandfather      No Known Problems Paternal Grandmother      No Known Problems Paternal Grandfather      No Known Problems Cousin      Breast cancer Niece  40    Ovarian cancer Neg Hx      BRCA 1/2 Neg Hx         Social History     Socioeconomic History    Marital status:    Tobacco Use    Smoking status: Never    Smokeless tobacco: Never   Substance and Sexual Activity    Alcohol use: No    Drug use: No    Sexual activity: Not Currently     Social Drivers of Health     Financial Resource Strain: Low Risk  (6/23/2025)    Overall Financial Resource Strain (CARDIA)     Difficulty of Paying Living Expenses: Not hard at all   Food Insecurity: No Food Insecurity (6/23/2025)    Hunger Vital Sign      "Worried About Running Out of Food in the Last Year: Never true     Ran Out of Food in the Last Year: Never true   Transportation Needs: No Transportation Needs (6/23/2025)    PRAPARE - Transportation     Lack of Transportation (Medical): No     Lack of Transportation (Non-Medical): No   Physical Activity: Inactive (6/23/2025)    Exercise Vital Sign     Days of Exercise per Week: 0 days     Minutes of Exercise per Session: 0 min   Stress: Stress Concern Present (6/23/2025)    [employer] [employer] - Occupational Stress Questionnaire     Feeling of Stress : To some extent   Housing Stability: Low Risk  (6/23/2025)    Housing Stability Vital Sign     Unable to Pay for Housing in the Last Year: No     Number of Times Moved in the Last Year: 0     Homeless in the Last Year: No       Review of patient's allergies indicates:   Allergen Reactions    Demerol (pf) [meperidine (pf)] Nausea And Vomiting    Erythromycin Other (See Comments)     Stomach cramps    Lorabid [loracarbef] Nausea And Vomiting    Penicillin g Hives    Fosamax [alendronate] Other (See Comments)     Bone pain       Review of Systems   All other systems reviewed and are negative.         Objective:    Weight:   Loss of 5 pounds in 1 year  Vitals:    07/02/25 0927   BP: (!) 146/62   BP Location: Left arm   Patient Position: Sitting   Pulse: (!) 51   Resp: 18   Temp: 97.4 °F (36.3 °C)   TempSrc: Temporal   SpO2: 98%   Weight: 67.1 kg (147 lb 14.9 oz)   Height: 5' 6" (1.676 m)          Physical Exam  Vitals reviewed.   Constitutional:       General: She is not in acute distress.  HENT:      Head: Normocephalic and atraumatic.      Mouth/Throat:      Pharynx: Oropharynx is clear.   Eyes:      Conjunctiva/sclera: Conjunctivae normal.   Cardiovascular:      Rate and Rhythm: Regular rhythm. Bradycardia present.      Pulses: Normal pulses.      Heart sounds: No murmur heard.  Pulmonary:      Effort: Pulmonary effort is normal. No respiratory " distress.      Breath sounds: Normal breath sounds.   Chest:   Breasts:     Right: Absent.      Left: Normal.          Comments: scar  Abdominal:      General: Bowel sounds are normal. There is no distension.      Palpations: Abdomen is soft.      Tenderness: There is no abdominal tenderness.   Musculoskeletal:         General: Normal range of motion.      Cervical back: Neck supple.      Right lower leg: No edema.      Left lower leg: No edema.   Lymphadenopathy:      Cervical: No cervical adenopathy.      Upper Body:      Right upper body: No supraclavicular or axillary adenopathy.      Left upper body: No supraclavicular or axillary adenopathy.   Skin:     General: Skin is warm and dry.   Neurological:      Mental Status: She is alert and oriented to person, place, and time.   Psychiatric:         Behavior: Behavior normal.         Thought Content: Thought content normal.             Current Outpatient Medications on File Prior to Visit   Medication Sig    ascorbic acid, vitamin C, (VITAMIN C) 1000 MG tablet Take 1,000 mg by mouth once daily.    calcium carbonate (OS-KAYLIN) 600 mg calcium (1,500 mg) Tab Take 600 mg by mouth once daily.    cetirizine (ZYRTEC) 10 MG tablet Take 1 tablet (10 mg total) by mouth as needed for Allergies. For allergies    ergocalciferol, vitamin D2, (VITAMIN D ORAL) Take 5,000 Units by mouth once daily.    fish oil-omega-3 fatty acids 300-1,000 mg capsule Take 1 capsule by mouth once daily. On hold    fluticasone propionate (FLONASE) 50 mcg/actuation nasal spray USE 1 SPRAY (50 MCG TOTAL) IN EACH NOSTRIL ONCE DAILY    Lactobacillus rhamnosus GG (CULTURELLE) 10 billion cell capsule Take 1 capsule by mouth once daily.    levocetirizine (XYZAL) 5 MG tablet Take 5 mg by mouth.    levothyroxine (SYNTHROID) 88 MCG tablet Take 1 tablet (88 mcg total) by mouth before breakfast.    losartan (COZAAR) 25 MG tablet Take 1 tablet (25 mg total) by mouth once daily.    multivitamin capsule Take 1  capsule by mouth once daily.    TUMERIC-GING-OLIVE-OREG-CAPRYL ORAL Take by mouth.     No current facility-administered medications on file prior to visit.       CBC:  Lab Results   Component Value Date    WBC 4.27 07/02/2025    HGB 11.7 (L) 07/02/2025    HCT 33.6 (L) 07/02/2025    MCV 91 07/02/2025     07/02/2025     CMP:  Sodium   Date Value Ref Range Status   07/02/2025 141 136 - 145 mmol/L Final   02/11/2025 140 136 - 145 mmol/L Final     Potassium   Date Value Ref Range Status   07/02/2025 4.3 3.5 - 5.1 mmol/L Final   02/11/2025 4.2 3.5 - 5.1 mmol/L Final     Chloride   Date Value Ref Range Status   07/02/2025 107 95 - 110 mmol/L Final   02/11/2025 107 95 - 110 mmol/L Final     CO2   Date Value Ref Range Status   07/02/2025 25 23 - 29 mmol/L Final   02/11/2025 23 23 - 29 mmol/L Final     Glucose   Date Value Ref Range Status   07/02/2025 91 70 - 110 mg/dL Final   02/11/2025 89 70 - 110 mg/dL Final     BUN   Date Value Ref Range Status   07/02/2025 20 8 - 23 mg/dL Final     Creatinine   Date Value Ref Range Status   07/02/2025 0.8 0.5 - 1.4 mg/dL Final     Calcium   Date Value Ref Range Status   07/02/2025 9.6 8.7 - 10.5 mg/dL Final   02/11/2025 9.8 8.7 - 10.5 mg/dL Final     Protein Total   Date Value Ref Range Status   07/02/2025 7.1 6.0 - 8.4 gm/dL Final     Total Protein   Date Value Ref Range Status   02/11/2025 7.2 6.0 - 8.4 g/dL Final     Albumin   Date Value Ref Range Status   07/02/2025 3.8 3.5 - 5.2 g/dL Final   02/11/2025 3.9 3.5 - 5.2 g/dL Final     Total Bilirubin   Date Value Ref Range Status   02/11/2025 0.5 0.1 - 1.0 mg/dL Final     Comment:     For infants and newborns, interpretation of results should be based  on gestational age, weight and in agreement with clinical  observations.    Premature Infant recommended reference ranges:  Up to 24 hours.............<8.0 mg/dL  Up to 48 hours............<12.0 mg/dL  3-5 days..................<15.0 mg/dL  6-29 days.................<15.0 mg/dL        Bilirubin Total   Date Value Ref Range Status   07/02/2025 0.4 0.1 - 1.0 mg/dL Final     Comment:     For infants and newborns, interpretation of results should be based   on gestational age, weight and in agreement with clinical   observations.    Premature Infant recommended reference ranges:   0-24 hours:  <8.0 mg/dL   24-48 hours: <12.0 mg/dL   3-5 days:    <15.0 mg/dL   6-29 days:   <15.0 mg/dL     Alkaline Phosphatase   Date Value Ref Range Status   02/11/2025 55 40 - 150 U/L Final     ALP   Date Value Ref Range Status   07/02/2025 50 40 - 150 unit/L Final     AST   Date Value Ref Range Status   07/02/2025 17 11 - 45 unit/L Final   02/11/2025 18 10 - 40 U/L Final     ALT   Date Value Ref Range Status   07/02/2025 15 10 - 44 unit/L Final   02/11/2025 15 10 - 44 U/L Final     Anion Gap   Date Value Ref Range Status   07/02/2025 9 8 - 16 mmol/L Final     eGFR if    Date Value Ref Range Status   03/28/2022 >60 >60 mL/min/1.73 m^2 Final     eGFR    Date Value Ref Range Status   03/28/2025 78 mL/min/1.73mSq Final     Comment:     In accordance with NKF-ASN Task Force recommendation, calculation based on the Chronic Kidney Disease Epidemiology Collaboration (CKD-EPI) equation without adjustment for race. eGFR adjusted for gender and age and calculated in ml/min/1.73mSquared. eGFR cannot be calculated if patient is under 18 years of age.     Reference Range:   >= 60 ml/min/1.73mSquared.     eGFR if non    Date Value Ref Range Status   03/28/2022 >60 >60 mL/min/1.73 m^2 Final     Comment:     Calculation used to obtain the estimated glomerular filtration  rate (eGFR) is the CKD-EPI equation.        Vitamin D:   pending; will post results    06/21/25  Mammo Digital Screening Left with Dax  Narrative: Facility:  [facility] [place] [address] [place] [address]  [phone]    Name: Sarahy Coronel    MRN: [id number]    Result:  Mammo Digital  Screening Left with Dax    History:  Patient is 79 y.o. and is seen for a screening mammogram.    Films Compared:  Compared to: 06/18/2024 Mammo Digital Diagnostic Left with Dax (No   Change), 06/19/2023 Mammo Digital Screening Left with Dax, and 06/16/2022   Mammo Digital Screening Left with Dax     Findings:  This procedure was performed using tomosynthesis.   Computer-aided detection was utilized in the interpretation of this   examination.    There are scattered areas of fibroglandular density. There is no evidence   of suspicious masses, microcalcifications or architectural distortion.  Impression:    No mammographic evidence of malignancy.    BI-RADS Category 1: Negative    Recommendation:  Routine screening mammogram in 1 year is recommended.    Electronically signed by,  Kian Mayen MD    06/18/24:  BMD:  Osteopenia with significant loss of bone density in left femoral neck; lumbar spine stable  -on Prolia x 6 years; now decline in bone density    All pertinent labs & imaging reviewed.    Assessment:       1. History of right breast cancer    2. S/P right mastectomy    3. Senile osteoporosis           Plan:     History of right breast cancer    S/P right mastectomy    Senile osteoporosis         Hx of right breast cancer - DEYVI @ 86 months post tx; f/u in 1 year with Mammo, left (on or after 06/22/26), BMD (on same day as Mammo), CBC, CMP, Vitamin D  Osteoporosis - improved to Osteopenia; s/p 5 years of Prolia; transitioned to Reclast with 1st infusion 07/02/24; Proceed with Reclast today; continue calcium supplementation & Vitamin D 2,000 I/u day.  Normocytic anemia - mild, asymptomatic.  Call for any concerns.    YOSI Barba, FNP-C  [facility]  30 minutes were spent in coordination of patient's care, record review and counseling.    Route Chart for Scheduling    Med Onc Chart Routing      Follow up with physician    Follow up with GIOVANI 1 year. f/u  in 1 year for annual breast/Reclast with CBC, CMP, Vitamin D, Mammo, left, BMD - Imaging same day on or after 06/22/26   Infusion scheduling note   Reclast today   Injection scheduling note    Labs    Imaging    Pharmacy appointment    Other referrals

## 2025-07-01 NOTE — PROGRESS NOTES
[facility]      New Overactive Bladder (OAB) Note    06/30/2025    Referred by:  Kassandra Jenkins, DRE, AP*    The patient has not been previously seen by a specialist board-certified in Female Urology/Urogynecology (Health Care Provider Taxonomy code [id number]) in our system previously and is meeting with me today for specialty care.     History of Present Illness    CHIEF COMPLAINT:  Patient presents with urinary incontinence and frequent nighttime urination, likely associated with overactive bladder.    HPI:  Patient is a 79-year-old female with urinary urgency and frequency. She needs to hurry to the bathroom when feeling a strong urge to urinate. After voiding, she sometimes feels incomplete bladder emptying and needs to return shortly after. She reports nocturia, waking up to urinate 3-4 times per night. During the day, she urinates frequently but cannot quantify the frequency.    She has occasional urinary incontinence with minimal leakage when hurrying to the bathroom or after voiding, though this does not occur daily.    Her sleep is disrupted due to frequent nighttime urination. The urge to urinate sometimes wakes her, while other times she wakes due to traffic noise and then notices the need to urinate.    She has implemented lifestyle modifications to manage her symptoms. She ceases fluid intake around 6:00 PM, approximately 3-4 hours before bedtime. She previously drank chamomile tea in the evenings as a sleep aid but has reduced this practice.    She denies constant urinary leakage and having a compressed bladder.    MEDICATIONS:  Patient is taking vitamins and chamomile tea for relaxation at night, although she has reduced her consumption of the tea.    MEDICAL HISTORY:  Patient has a history of breast cancer, deep vein thrombosis, mitral regurgitation, and malignant hyperthermia associated with general anesthesia. Patient had a hysterectomy in the 1970s.    FAMILY HISTORY:  Family  "history is significant for her father with bladder cancer, who is now .    SURGICAL HISTORY:  She underwent a hysterectomy in the 1970s and a mastectomy in .    TEST RESULTS:  CT: May 2025, no evidence of hydronephrosis or urolithiasis          A review of 10+ systems was conducted with pertinent positive and negative findings documented in HPI with all other systems reviewed and negative.    Past medical, family, surgical and social history reviewed as documented in chart with pertinent positive medical, family, surgical and social history detailed in HPI.    Previous OAB therapies:  Behavioral Therapies  : (fluid/dietary modification) -  provided no benefit  Medications  None  Other Therapies  No Other Therapies    Previous KAREN therapies:  no previous therapy      Exam Findings:    Physical Exam    General: No acute distress. Nontoxic appearing.  HENT: Normocephalic. Atraumatic.  Respiratory: Normal respiratory effort. No conversational dyspnea. No audible wheezing.  Abdomen: No obvious distension.  Skin: No visible abnormalities.  Extremities: No edema upper extremities. No edema lower extremities.  Neurological: Alert and oriented x3. Normal speech.  Psychiatric: Normal mood. Normal affect. No evidence of SI.          Assessment/Plan:    Assessment & Plan    Patient presents with symptoms consistent with overactive bladder, including urinary frequency, nocturia (3-4 times per night), and urgency.  Offered prescription for overactive bladder medication to reduce frequency and urgency, but patient declined due to preference to avoid additional medications.  Agreed with current behavioral modifications (fluid restriction in evening) as conservative management.  Explained overactive bladder as the likely cause of symptoms, rather than a "compressed bladder."    URGE INCONTINENCE:  1. Explained overactive bladder medications as allowing the bladder to hold more urine comfortably.    NOCTURIA:  1. " Discussed that normal nighttime urination is 1-2 times, while 3-4 times is excessive and can interfere with sleep.  2. Patient to continue current practice of restricting fluid intake in the evening, approximately 3-4 hours before bedtime.    PLAN SUMMARY:  1. Continue current practice of restricting fluid intake in the evening, 3-4 hours before bedtime  2. Explained overactive bladder medications and their function  3. Educated patient on normal vs. excessive nighttime urination frequency   4. She does not wish to pursue medical management at this time.              Visit complexity today is associated with medical care services that are part of the ongoing care related to the single serious and/or complex condition of Overactive bladder (OAB). A longitudinal relationship exists or is being developed between the patient and this practitioner for the care of this condition.

## 2025-07-02 ENCOUNTER — OFFICE VISIT (OUTPATIENT)
Dept: HEMATOLOGY/ONCOLOGY | Facility: CLINIC | Age: 80
End: 2025-07-02
Payer: MEDICARE

## 2025-07-02 ENCOUNTER — INFUSION (OUTPATIENT)
Dept: INFUSION THERAPY | Facility: HOSPITAL | Age: 80
End: 2025-07-02
Attending: NURSE PRACTITIONER
Payer: MEDICARE

## 2025-07-02 ENCOUNTER — LAB VISIT (OUTPATIENT)
Dept: LAB | Facility: HOSPITAL | Age: 80
End: 2025-07-02
Attending: NURSE PRACTITIONER
Payer: MEDICARE

## 2025-07-02 VITALS
RESPIRATION RATE: 18 BRPM | TEMPERATURE: 97 F | WEIGHT: 147.94 LBS | SYSTOLIC BLOOD PRESSURE: 146 MMHG | OXYGEN SATURATION: 98 % | HEIGHT: 66 IN | BODY MASS INDEX: 23.77 KG/M2 | DIASTOLIC BLOOD PRESSURE: 62 MMHG | HEART RATE: 51 BPM

## 2025-07-02 VITALS
BODY MASS INDEX: 23.77 KG/M2 | SYSTOLIC BLOOD PRESSURE: 148 MMHG | OXYGEN SATURATION: 99 % | HEIGHT: 66 IN | HEART RATE: 48 BPM | DIASTOLIC BLOOD PRESSURE: 65 MMHG | WEIGHT: 147.94 LBS | RESPIRATION RATE: 18 BRPM | TEMPERATURE: 98 F

## 2025-07-02 DIAGNOSIS — M81.0 SENILE OSTEOPOROSIS: Primary | ICD-10-CM

## 2025-07-02 DIAGNOSIS — D64.9 NORMOCYTIC ANEMIA: ICD-10-CM

## 2025-07-02 DIAGNOSIS — M94.9 DISORDER OF BONE AND CARTILAGE: ICD-10-CM

## 2025-07-02 DIAGNOSIS — M81.0 SENILE OSTEOPOROSIS: ICD-10-CM

## 2025-07-02 DIAGNOSIS — Z12.31 ENCOUNTER FOR SCREENING MAMMOGRAM FOR MALIGNANT NEOPLASM OF BREAST: ICD-10-CM

## 2025-07-02 DIAGNOSIS — Z85.3 HISTORY OF RIGHT BREAST CANCER: ICD-10-CM

## 2025-07-02 DIAGNOSIS — Z85.3 HISTORY OF RIGHT BREAST CANCER: Primary | ICD-10-CM

## 2025-07-02 DIAGNOSIS — M89.9 DISORDER OF BONE AND CARTILAGE: ICD-10-CM

## 2025-07-02 DIAGNOSIS — Z90.11 S/P RIGHT MASTECTOMY: ICD-10-CM

## 2025-07-02 LAB
25(OH)D3+25(OH)D2 SERPL-MCNC: 83 NG/ML (ref 30–96)
ABSOLUTE EOSINOPHIL (OHS): 0.08 K/UL
ABSOLUTE MONOCYTE (OHS): 0.46 K/UL (ref 0.3–1)
ABSOLUTE NEUTROPHIL COUNT (OHS): 2.42 K/UL (ref 1.8–7.7)
ALBUMIN SERPL BCP-MCNC: 3.8 G/DL (ref 3.5–5.2)
ALP SERPL-CCNC: 50 UNIT/L (ref 40–150)
ALT SERPL W/O P-5'-P-CCNC: 15 UNIT/L (ref 10–44)
ANION GAP (OHS): 9 MMOL/L (ref 8–16)
AST SERPL-CCNC: 17 UNIT/L (ref 11–45)
BASOPHILS # BLD AUTO: 0.01 K/UL
BASOPHILS NFR BLD AUTO: 0.2 %
BILIRUB SERPL-MCNC: 0.4 MG/DL (ref 0.1–1)
BUN SERPL-MCNC: 20 MG/DL (ref 8–23)
CALCIUM SERPL-MCNC: 9.6 MG/DL (ref 8.7–10.5)
CHLORIDE SERPL-SCNC: 107 MMOL/L (ref 95–110)
CO2 SERPL-SCNC: 25 MMOL/L (ref 23–29)
CREAT SERPL-MCNC: 0.8 MG/DL (ref 0.5–1.4)
ERYTHROCYTE [DISTWIDTH] IN BLOOD BY AUTOMATED COUNT: 12.5 % (ref 11.5–14.5)
GFR SERPLBLD CREATININE-BSD FMLA CKD-EPI: >60 ML/MIN/1.73/M2
GLUCOSE SERPL-MCNC: 91 MG/DL (ref 70–110)
HCT VFR BLD AUTO: 33.6 % (ref 37–48.5)
HGB BLD-MCNC: 11.7 GM/DL (ref 12–16)
IMM GRANULOCYTES # BLD AUTO: 0 K/UL (ref 0–0.04)
IMM GRANULOCYTES NFR BLD AUTO: 0 % (ref 0–0.5)
LYMPHOCYTES # BLD AUTO: 1.3 K/UL (ref 1–4.8)
MCH RBC QN AUTO: 31.7 PG (ref 27–31)
MCHC RBC AUTO-ENTMCNC: 34.8 G/DL (ref 32–36)
MCV RBC AUTO: 91 FL (ref 82–98)
NUCLEATED RBC (/100WBC) (OHS): 0 /100 WBC
PLATELET # BLD AUTO: 167 K/UL (ref 150–450)
PMV BLD AUTO: 10 FL (ref 9.2–12.9)
POTASSIUM SERPL-SCNC: 4.3 MMOL/L (ref 3.5–5.1)
PROT SERPL-MCNC: 7.1 GM/DL (ref 6–8.4)
RBC # BLD AUTO: 3.69 M/UL (ref 4–5.4)
RELATIVE EOSINOPHIL (OHS): 1.9 %
RELATIVE LYMPHOCYTE (OHS): 30.4 % (ref 18–48)
RELATIVE MONOCYTE (OHS): 10.8 % (ref 4–15)
RELATIVE NEUTROPHIL (OHS): 56.7 % (ref 38–73)
SODIUM SERPL-SCNC: 141 MMOL/L (ref 136–145)
WBC # BLD AUTO: 4.27 K/UL (ref 3.9–12.7)

## 2025-07-02 PROCEDURE — 63600175 PHARM REV CODE 636 W HCPCS: Mod: PN | Performed by: NURSE PRACTITIONER

## 2025-07-02 PROCEDURE — 36415 COLL VENOUS BLD VENIPUNCTURE: CPT | Mod: PN

## 2025-07-02 PROCEDURE — 99999 PR PBB SHADOW E&M-EST. PATIENT-LVL IV: CPT | Mod: PBBFAC,,, | Performed by: NURSE PRACTITIONER

## 2025-07-02 PROCEDURE — 99214 OFFICE O/P EST MOD 30 MIN: CPT | Mod: PBBFAC,PN | Performed by: NURSE PRACTITIONER

## 2025-07-02 PROCEDURE — 84460 ALANINE AMINO (ALT) (SGPT): CPT | Mod: PN

## 2025-07-02 PROCEDURE — 82306 VITAMIN D 25 HYDROXY: CPT

## 2025-07-02 PROCEDURE — 96374 THER/PROPH/DIAG INJ IV PUSH: CPT | Mod: PN

## 2025-07-02 PROCEDURE — 85025 COMPLETE CBC W/AUTO DIFF WBC: CPT | Mod: PN

## 2025-07-02 PROCEDURE — 25000003 PHARM REV CODE 250: Mod: PN | Performed by: NURSE PRACTITIONER

## 2025-07-02 RX ORDER — HEPARIN 100 UNIT/ML
500 SYRINGE INTRAVENOUS
OUTPATIENT
Start: 2025-07-02

## 2025-07-02 RX ORDER — HEPARIN 100 UNIT/ML
500 SYRINGE INTRAVENOUS
Status: CANCELLED | OUTPATIENT
Start: 2025-07-02

## 2025-07-02 RX ORDER — SODIUM CHLORIDE 0.9 % (FLUSH) 0.9 %
10 SYRINGE (ML) INJECTION
OUTPATIENT
Start: 2025-07-02

## 2025-07-02 RX ORDER — ZOLEDRONIC ACID 5 MG/100ML
5 INJECTION, SOLUTION INTRAVENOUS
Status: CANCELLED | OUTPATIENT
Start: 2025-07-02

## 2025-07-02 RX ORDER — SODIUM CHLORIDE 0.9 % (FLUSH) 0.9 %
10 SYRINGE (ML) INJECTION
Status: CANCELLED | OUTPATIENT
Start: 2025-07-02

## 2025-07-02 RX ORDER — SODIUM CHLORIDE 0.9 % (FLUSH) 0.9 %
10 SYRINGE (ML) INJECTION
Status: DISCONTINUED | OUTPATIENT
Start: 2025-07-02 | End: 2025-07-02 | Stop reason: HOSPADM

## 2025-07-02 RX ORDER — ZOLEDRONIC ACID 5 MG/100ML
5 INJECTION, SOLUTION INTRAVENOUS
Status: COMPLETED | OUTPATIENT
Start: 2025-07-02 | End: 2025-07-02

## 2025-07-02 RX ORDER — ZOLEDRONIC ACID 5 MG/100ML
5 INJECTION, SOLUTION INTRAVENOUS
OUTPATIENT
Start: 2025-07-02

## 2025-07-02 RX ADMIN — ZOLEDRONIC ACID 5 MG: 0.05 INJECTION, SOLUTION INTRAVENOUS at 10:07

## 2025-07-02 RX ADMIN — SODIUM CHLORIDE: 9 INJECTION, SOLUTION INTRAVENOUS at 10:07

## 2025-07-02 NOTE — PLAN OF CARE
Tolerated reclast well.  No reactions noted.  No questions or concerns at this time.  Ambulated off unit in NAD.

## 2025-09-05 ENCOUNTER — TELEPHONE (OUTPATIENT)
Dept: HEMATOLOGY/ONCOLOGY | Facility: CLINIC | Age: 80
End: 2025-09-05
Payer: MEDICARE

## (undated) DEVICE — SPONGE LAP 18X18 PREWASHED

## (undated) DEVICE — STOCKINETTE IMPERV INTRM 9X44

## (undated) DEVICE — SEE MEDLINE ITEM 157116

## (undated) DEVICE — EVACUATOR WOUND BULB 100CC

## (undated) DEVICE — SUT SILK 3-0 SH 18IN BLACK

## (undated) DEVICE — DRESSING MEPORE 3.6 X 10

## (undated) DEVICE — DRAPE STERI INSTRUMENT 1018

## (undated) DEVICE — SEE MEDLINE ITEM 157128

## (undated) DEVICE — DRESSING TRANS 4X4 TEGADERM

## (undated) DEVICE — APPLIER LIGACLIP SM 9.38IN

## (undated) DEVICE — CLOSURE SKIN STERI STRIP 1/2X4

## (undated) DEVICE — DRESSING MEPILEX BORDR AG 4X10

## (undated) DEVICE — SUT 3-0 VICRYL / SH (J416)

## (undated) DEVICE — GAUZE SPONGE BULKEE 6X6.75IN

## (undated) DEVICE — SEE MEDLINE ITEM 152622

## (undated) DEVICE — GLOVE SURGEONS ULTRA TOUCH 6.5

## (undated) DEVICE — GOWN X-LG STERILE BACK

## (undated) DEVICE — SYR MEDALLION WHITE 1ML

## (undated) DEVICE — DRAPE LAP TIBURON 77X122IN

## (undated) DEVICE — SEE MEDLINE ITEM 146313

## (undated) DEVICE — GAUZE SPONGE 4X4 12PLY

## (undated) DEVICE — NDL HYPO A BEVEL 22X1 1/2

## (undated) DEVICE — APPLICATOR CHLORAPREP ORN 26ML

## (undated) DEVICE — SUT 3-0 VICRYL SH CR/8 18

## (undated) DEVICE — SUT 2-0 ETHILON 18 FS

## (undated) DEVICE — SUT MONOCRYL 4-0 PS-2

## (undated) DEVICE — CHLORAPREP W TINT 26ML APPL

## (undated) DEVICE — SYR 10CC LUER LOCK

## (undated) DEVICE — SOL 9P NACL IRR PIC IL

## (undated) DEVICE — SUT 2-0 12-18IN SILK

## (undated) DEVICE — SEE MEDLINE ITEM 146292

## (undated) DEVICE — NDL SAFETY 21G X 1 1/2 ECLPSE

## (undated) DEVICE — STAPLER SKIN ROTATING HEAD

## (undated) DEVICE — NEOGUARD COVER 4X30CM STERILE

## (undated) DEVICE — ELECTRODE BLADE INSULATED 1 IN

## (undated) DEVICE — APPLIER CLIP LIAGCLIP 9.375IN

## (undated) DEVICE — SEE MEDLINE ITEM 157131

## (undated) DEVICE — SLEEVE SCD EXPRESS CALF MEDIUM

## (undated) DEVICE — SUT SILK 2.0 BLK 18

## (undated) DEVICE — PACK CUSTOM UNIV BASIN SLI

## (undated) DEVICE — SUT 3-0 VICRYL / LIGAPACK

## (undated) DEVICE — DRAIN SINGLE ROUND 3/16 15F

## (undated) DEVICE — SUT 2/0 30IN SILK BLK BRAI

## (undated) DEVICE — CAUTERY BOVIE PENCIL

## (undated) DEVICE — GLOVE SURG ULTRA TOUCH 7.5

## (undated) DEVICE — ELECTRODE BLADE W/SLEEVE 2.75

## (undated) DEVICE — SEE MEDLINE ITEM 157117

## (undated) DEVICE — SEE MEDLINE ITEM 146417

## (undated) DEVICE — LINER SUCTION 3000CC

## (undated) DEVICE — GLOVE SURG BIOGEL LATEX SZ 7.5

## (undated) DEVICE — SPONGE IV DRAIN 4X4 STERILE

## (undated) DEVICE — Device

## (undated) DEVICE — ELECTRODE REM PLYHSV RETURN 9